# Patient Record
Sex: FEMALE | Race: WHITE | NOT HISPANIC OR LATINO
[De-identification: names, ages, dates, MRNs, and addresses within clinical notes are randomized per-mention and may not be internally consistent; named-entity substitution may affect disease eponyms.]

---

## 2017-03-07 ENCOUNTER — APPOINTMENT (OUTPATIENT)
Dept: DERMATOLOGY | Facility: CLINIC | Age: 82
End: 2017-03-07

## 2017-05-30 ENCOUNTER — APPOINTMENT (OUTPATIENT)
Dept: DERMATOLOGY | Facility: CLINIC | Age: 82
End: 2017-05-30

## 2017-07-27 ENCOUNTER — APPOINTMENT (OUTPATIENT)
Dept: OBGYN | Facility: CLINIC | Age: 82
End: 2017-07-27

## 2017-10-19 ENCOUNTER — APPOINTMENT (OUTPATIENT)
Dept: NEUROLOGY | Facility: CLINIC | Age: 82
End: 2017-10-19

## 2017-10-24 ENCOUNTER — APPOINTMENT (OUTPATIENT)
Dept: DERMATOLOGY | Facility: CLINIC | Age: 82
End: 2017-10-24
Payer: MEDICARE

## 2017-10-24 VITALS — DIASTOLIC BLOOD PRESSURE: 60 MMHG | SYSTOLIC BLOOD PRESSURE: 96 MMHG

## 2017-10-24 PROCEDURE — 99214 OFFICE O/P EST MOD 30 MIN: CPT | Mod: GC

## 2017-10-24 RX ORDER — TRIAMCINOLONE ACETONIDE 1 MG/G
0.1 CREAM TOPICAL
Qty: 1 | Refills: 1 | Status: COMPLETED | COMMUNITY
Start: 2017-02-01 | End: 2017-10-24

## 2017-10-25 LAB
ALBUMIN SERPL ELPH-MCNC: 3.7 G/DL
ALP BLD-CCNC: 77 U/L
ALT SERPL-CCNC: 20 U/L
ANION GAP SERPL CALC-SCNC: 14 MMOL/L
AST SERPL-CCNC: 22 U/L
BASOPHILS # BLD AUTO: 0.05 K/UL
BASOPHILS NFR BLD AUTO: 0.6 %
BILIRUB SERPL-MCNC: 0.2 MG/DL
BUN SERPL-MCNC: 26 MG/DL
CALCIUM SERPL-MCNC: 9.6 MG/DL
CHLORIDE SERPL-SCNC: 104 MMOL/L
CO2 SERPL-SCNC: 26 MMOL/L
CREAT SERPL-MCNC: 0.79 MG/DL
EOSINOPHIL # BLD AUTO: 1.03 K/UL
EOSINOPHIL NFR BLD AUTO: 13.2 %
GLUCOSE SERPL-MCNC: 81 MG/DL
HCT VFR BLD CALC: 37.3 %
HGB BLD-MCNC: 12.1 G/DL
IMM GRANULOCYTES NFR BLD AUTO: 0.4 %
LYMPHOCYTES # BLD AUTO: 0.98 K/UL
LYMPHOCYTES NFR BLD AUTO: 12.5 %
MAN DIFF?: NORMAL
MCHC RBC-ENTMCNC: 30.6 PG
MCHC RBC-ENTMCNC: 32.4 GM/DL
MCV RBC AUTO: 94.2 FL
MONOCYTES # BLD AUTO: 0.78 K/UL
MONOCYTES NFR BLD AUTO: 10 %
NEUTROPHILS # BLD AUTO: 4.96 K/UL
NEUTROPHILS NFR BLD AUTO: 63.3 %
PLATELET # BLD AUTO: 255 K/UL
POTASSIUM SERPL-SCNC: 4.3 MMOL/L
PROT SERPL-MCNC: 7.2 G/DL
RBC # BLD: 3.96 M/UL
RBC # FLD: 13.9 %
SODIUM SERPL-SCNC: 144 MMOL/L
WBC # FLD AUTO: 7.83 K/UL

## 2017-11-10 ENCOUNTER — APPOINTMENT (OUTPATIENT)
Dept: DERMATOLOGY | Facility: CLINIC | Age: 82
End: 2017-11-10
Payer: MEDICARE

## 2017-11-10 VITALS — DIASTOLIC BLOOD PRESSURE: 60 MMHG | SYSTOLIC BLOOD PRESSURE: 94 MMHG

## 2017-11-10 PROCEDURE — 99213 OFFICE O/P EST LOW 20 MIN: CPT | Mod: 25,GC

## 2017-11-10 PROCEDURE — 96372 THER/PROPH/DIAG INJ SC/IM: CPT | Mod: GC

## 2017-11-27 ENCOUNTER — APPOINTMENT (OUTPATIENT)
Dept: DERMATOLOGY | Facility: CLINIC | Age: 82
End: 2017-11-27

## 2017-12-13 ENCOUNTER — APPOINTMENT (OUTPATIENT)
Dept: DERMATOLOGY | Facility: CLINIC | Age: 82
End: 2017-12-13

## 2017-12-15 ENCOUNTER — APPOINTMENT (OUTPATIENT)
Dept: DERMATOLOGY | Facility: CLINIC | Age: 82
End: 2017-12-15
Payer: MEDICARE

## 2017-12-15 PROCEDURE — 96372 THER/PROPH/DIAG INJ SC/IM: CPT

## 2017-12-15 PROCEDURE — 99213 OFFICE O/P EST LOW 20 MIN: CPT | Mod: 25

## 2017-12-29 ENCOUNTER — APPOINTMENT (OUTPATIENT)
Dept: DERMATOLOGY | Facility: CLINIC | Age: 82
End: 2017-12-29
Payer: MEDICARE

## 2017-12-29 VITALS — SYSTOLIC BLOOD PRESSURE: 120 MMHG | DIASTOLIC BLOOD PRESSURE: 60 MMHG

## 2017-12-29 VITALS — SYSTOLIC BLOOD PRESSURE: 118 MMHG | DIASTOLIC BLOOD PRESSURE: 78 MMHG

## 2017-12-29 PROCEDURE — 96372 THER/PROPH/DIAG INJ SC/IM: CPT | Mod: GC

## 2017-12-29 PROCEDURE — 99213 OFFICE O/P EST LOW 20 MIN: CPT | Mod: 25,GC

## 2018-01-16 ENCOUNTER — MOBILE ON CALL (OUTPATIENT)
Age: 83
End: 2018-01-16

## 2018-01-16 ENCOUNTER — APPOINTMENT (OUTPATIENT)
Dept: DERMATOLOGY | Facility: CLINIC | Age: 83
End: 2018-01-16
Payer: MEDICARE

## 2018-01-16 VITALS — SYSTOLIC BLOOD PRESSURE: 110 MMHG | DIASTOLIC BLOOD PRESSURE: 64 MMHG

## 2018-01-16 PROCEDURE — 99213 OFFICE O/P EST LOW 20 MIN: CPT | Mod: 25

## 2018-01-16 PROCEDURE — 96372 THER/PROPH/DIAG INJ SC/IM: CPT

## 2018-01-30 ENCOUNTER — APPOINTMENT (OUTPATIENT)
Dept: CARDIOLOGY | Facility: CLINIC | Age: 83
End: 2018-01-30
Payer: MEDICARE

## 2018-01-30 ENCOUNTER — NON-APPOINTMENT (OUTPATIENT)
Age: 83
End: 2018-01-30

## 2018-01-30 VITALS
HEART RATE: 100 BPM | WEIGHT: 90 LBS | DIASTOLIC BLOOD PRESSURE: 68 MMHG | HEIGHT: 61 IN | BODY MASS INDEX: 16.99 KG/M2 | SYSTOLIC BLOOD PRESSURE: 112 MMHG | OXYGEN SATURATION: 97 %

## 2018-01-30 DIAGNOSIS — Z00.00 ENCOUNTER FOR GENERAL ADULT MEDICAL EXAMINATION W/OUT ABNORMAL FINDINGS: ICD-10-CM

## 2018-01-30 PROCEDURE — 99397 PER PM REEVAL EST PAT 65+ YR: CPT

## 2018-01-30 RX ORDER — DUPILUMAB 300 MG/2ML
300 INJECTION, SOLUTION SUBCUTANEOUS
Qty: 1 | Refills: 0 | Status: DISCONTINUED | COMMUNITY
Start: 2017-10-25 | End: 2018-01-30

## 2018-01-30 RX ORDER — FLUOROMETHOLONE 1 MG/ML
0.1 SOLUTION/ DROPS OPHTHALMIC
Qty: 5 | Refills: 0 | Status: DISCONTINUED | COMMUNITY
Start: 2017-07-06 | End: 2018-01-30

## 2018-01-30 RX ORDER — ALCLOMETASONE DIPROPIONATE 0.5 MG/G
0.05 CREAM TOPICAL
Qty: 60 | Refills: 0 | Status: DISCONTINUED | COMMUNITY
Start: 2017-07-18 | End: 2018-01-30

## 2018-01-30 RX ORDER — FLUOCINONIDE 0.5 MG/G
0.05 CREAM TOPICAL
Qty: 60 | Refills: 0 | Status: DISCONTINUED | COMMUNITY
Start: 2017-08-24 | End: 2018-01-30

## 2018-02-02 ENCOUNTER — APPOINTMENT (OUTPATIENT)
Dept: DERMATOLOGY | Facility: CLINIC | Age: 83
End: 2018-02-02
Payer: MEDICARE

## 2018-02-02 VITALS — DIASTOLIC BLOOD PRESSURE: 66 MMHG | SYSTOLIC BLOOD PRESSURE: 120 MMHG

## 2018-02-02 PROCEDURE — 99213 OFFICE O/P EST LOW 20 MIN: CPT | Mod: 25,GC

## 2018-02-02 PROCEDURE — 96372 THER/PROPH/DIAG INJ SC/IM: CPT | Mod: GC

## 2018-02-07 ENCOUNTER — MOBILE ON CALL (OUTPATIENT)
Age: 83
End: 2018-02-07

## 2018-02-16 ENCOUNTER — APPOINTMENT (OUTPATIENT)
Dept: DERMATOLOGY | Facility: CLINIC | Age: 83
End: 2018-02-16

## 2018-02-20 ENCOUNTER — RX RENEWAL (OUTPATIENT)
Age: 83
End: 2018-02-20

## 2018-02-26 ENCOUNTER — APPOINTMENT (OUTPATIENT)
Dept: DERMATOLOGY | Facility: CLINIC | Age: 83
End: 2018-02-26
Payer: MEDICARE

## 2018-02-26 VITALS — DIASTOLIC BLOOD PRESSURE: 58 MMHG | SYSTOLIC BLOOD PRESSURE: 100 MMHG

## 2018-02-26 PROCEDURE — 99214 OFFICE O/P EST MOD 30 MIN: CPT

## 2018-02-27 ENCOUNTER — MOBILE ON CALL (OUTPATIENT)
Age: 83
End: 2018-02-27

## 2018-03-19 ENCOUNTER — APPOINTMENT (OUTPATIENT)
Dept: DERMATOLOGY | Facility: CLINIC | Age: 83
End: 2018-03-19
Payer: MEDICARE

## 2018-03-19 PROCEDURE — 99213 OFFICE O/P EST LOW 20 MIN: CPT | Mod: GC

## 2018-03-23 ENCOUNTER — APPOINTMENT (OUTPATIENT)
Dept: DERMATOLOGY | Facility: CLINIC | Age: 83
End: 2018-03-23
Payer: MEDICARE

## 2018-03-23 VITALS — DIASTOLIC BLOOD PRESSURE: 62 MMHG | SYSTOLIC BLOOD PRESSURE: 114 MMHG

## 2018-03-23 PROCEDURE — 99213 OFFICE O/P EST LOW 20 MIN: CPT | Mod: 25,GC

## 2018-03-23 PROCEDURE — 96372 THER/PROPH/DIAG INJ SC/IM: CPT | Mod: GC

## 2018-04-06 ENCOUNTER — APPOINTMENT (OUTPATIENT)
Dept: DERMATOLOGY | Facility: CLINIC | Age: 83
End: 2018-04-06
Payer: MEDICARE

## 2018-04-06 VITALS — DIASTOLIC BLOOD PRESSURE: 60 MMHG | SYSTOLIC BLOOD PRESSURE: 110 MMHG

## 2018-04-06 PROCEDURE — 96372 THER/PROPH/DIAG INJ SC/IM: CPT

## 2018-04-06 PROCEDURE — 99213 OFFICE O/P EST LOW 20 MIN: CPT | Mod: 25

## 2018-04-11 ENCOUNTER — APPOINTMENT (OUTPATIENT)
Age: 83
End: 2018-04-11

## 2018-04-19 ENCOUNTER — APPOINTMENT (OUTPATIENT)
Dept: CARDIOLOGY | Facility: CLINIC | Age: 83
End: 2018-04-19

## 2018-04-20 ENCOUNTER — APPOINTMENT (OUTPATIENT)
Dept: DERMATOLOGY | Facility: CLINIC | Age: 83
End: 2018-04-20
Payer: MEDICARE

## 2018-04-20 VITALS — DIASTOLIC BLOOD PRESSURE: 62 MMHG | SYSTOLIC BLOOD PRESSURE: 110 MMHG

## 2018-04-20 PROCEDURE — 96372 THER/PROPH/DIAG INJ SC/IM: CPT | Mod: GC

## 2018-04-20 PROCEDURE — 99213 OFFICE O/P EST LOW 20 MIN: CPT | Mod: 25,GC

## 2018-05-01 ENCOUNTER — APPOINTMENT (OUTPATIENT)
Dept: DERMATOLOGY | Facility: CLINIC | Age: 83
End: 2018-05-01
Payer: MEDICARE

## 2018-05-01 VITALS
BODY MASS INDEX: 18.5 KG/M2 | DIASTOLIC BLOOD PRESSURE: 60 MMHG | HEIGHT: 61 IN | WEIGHT: 98 LBS | SYSTOLIC BLOOD PRESSURE: 90 MMHG

## 2018-05-01 PROCEDURE — 99212 OFFICE O/P EST SF 10 MIN: CPT

## 2018-05-04 ENCOUNTER — APPOINTMENT (OUTPATIENT)
Dept: DERMATOLOGY | Facility: CLINIC | Age: 83
End: 2018-05-04
Payer: MEDICARE

## 2018-05-04 VITALS — SYSTOLIC BLOOD PRESSURE: 108 MMHG | DIASTOLIC BLOOD PRESSURE: 60 MMHG

## 2018-05-04 PROCEDURE — 99213 OFFICE O/P EST LOW 20 MIN: CPT | Mod: 25,GC

## 2018-05-04 PROCEDURE — 96372 THER/PROPH/DIAG INJ SC/IM: CPT | Mod: GC

## 2018-05-08 ENCOUNTER — APPOINTMENT (OUTPATIENT)
Dept: DERMATOLOGY | Facility: CLINIC | Age: 83
End: 2018-05-08

## 2018-05-18 ENCOUNTER — APPOINTMENT (OUTPATIENT)
Dept: DERMATOLOGY | Facility: CLINIC | Age: 83
End: 2018-05-18
Payer: MEDICARE

## 2018-05-18 VITALS — DIASTOLIC BLOOD PRESSURE: 58 MMHG | SYSTOLIC BLOOD PRESSURE: 88 MMHG

## 2018-05-18 PROCEDURE — 99213 OFFICE O/P EST LOW 20 MIN: CPT | Mod: 25,GC

## 2018-05-18 PROCEDURE — 96372 THER/PROPH/DIAG INJ SC/IM: CPT | Mod: GC

## 2018-06-01 ENCOUNTER — APPOINTMENT (OUTPATIENT)
Dept: DERMATOLOGY | Facility: CLINIC | Age: 83
End: 2018-06-01
Payer: MEDICARE

## 2018-06-01 VITALS — DIASTOLIC BLOOD PRESSURE: 62 MMHG | SYSTOLIC BLOOD PRESSURE: 106 MMHG

## 2018-06-01 PROCEDURE — 99213 OFFICE O/P EST LOW 20 MIN: CPT | Mod: 25,GC

## 2018-06-01 PROCEDURE — 96372 THER/PROPH/DIAG INJ SC/IM: CPT | Mod: GC

## 2018-06-08 ENCOUNTER — APPOINTMENT (OUTPATIENT)
Dept: WOUND CARE | Facility: CLINIC | Age: 83
End: 2018-06-08
Payer: MEDICARE

## 2018-06-08 DIAGNOSIS — L03.032 CELLULITIS OF LEFT TOE: ICD-10-CM

## 2018-06-08 DIAGNOSIS — L60.0 INGROWING NAIL: ICD-10-CM

## 2018-06-08 PROCEDURE — 99213 OFFICE O/P EST LOW 20 MIN: CPT

## 2018-06-15 ENCOUNTER — APPOINTMENT (OUTPATIENT)
Dept: DERMATOLOGY | Facility: CLINIC | Age: 83
End: 2018-06-15
Payer: MEDICARE

## 2018-06-15 VITALS — SYSTOLIC BLOOD PRESSURE: 104 MMHG | DIASTOLIC BLOOD PRESSURE: 68 MMHG

## 2018-06-15 PROCEDURE — 99213 OFFICE O/P EST LOW 20 MIN: CPT | Mod: 25

## 2018-06-15 PROCEDURE — 96372 THER/PROPH/DIAG INJ SC/IM: CPT

## 2018-06-20 ENCOUNTER — APPOINTMENT (OUTPATIENT)
Dept: DERMATOLOGY | Facility: CLINIC | Age: 83
End: 2018-06-20
Payer: MEDICARE

## 2018-06-20 ENCOUNTER — APPOINTMENT (OUTPATIENT)
Dept: CARDIOLOGY | Facility: CLINIC | Age: 83
End: 2018-06-20
Payer: MEDICARE

## 2018-06-20 VITALS
BODY MASS INDEX: 17.37 KG/M2 | OXYGEN SATURATION: 99 % | HEIGHT: 61 IN | SYSTOLIC BLOOD PRESSURE: 99 MMHG | HEART RATE: 90 BPM | DIASTOLIC BLOOD PRESSURE: 61 MMHG | WEIGHT: 92 LBS

## 2018-06-20 VITALS — DIASTOLIC BLOOD PRESSURE: 60 MMHG | SYSTOLIC BLOOD PRESSURE: 102 MMHG

## 2018-06-20 PROCEDURE — 99213 OFFICE O/P EST LOW 20 MIN: CPT

## 2018-06-29 ENCOUNTER — APPOINTMENT (OUTPATIENT)
Dept: DERMATOLOGY | Facility: CLINIC | Age: 83
End: 2018-06-29
Payer: MEDICARE

## 2018-06-29 VITALS — DIASTOLIC BLOOD PRESSURE: 64 MMHG | SYSTOLIC BLOOD PRESSURE: 106 MMHG

## 2018-06-29 PROCEDURE — 96372 THER/PROPH/DIAG INJ SC/IM: CPT

## 2018-06-29 PROCEDURE — 99214 OFFICE O/P EST MOD 30 MIN: CPT | Mod: 25

## 2018-06-29 RX ORDER — EPINEPHRINE 0.3 MG/.3ML
0.3 INJECTION INTRAMUSCULAR
Qty: 1 | Refills: 2 | Status: ACTIVE | COMMUNITY
Start: 2018-06-29 | End: 1900-01-01

## 2018-07-06 ENCOUNTER — APPOINTMENT (OUTPATIENT)
Dept: ORTHOPEDIC SURGERY | Facility: CLINIC | Age: 83
End: 2018-07-06
Payer: MEDICARE

## 2018-07-06 VITALS
SYSTOLIC BLOOD PRESSURE: 122 MMHG | DIASTOLIC BLOOD PRESSURE: 76 MMHG | HEART RATE: 93 BPM | WEIGHT: 92 LBS | HEIGHT: 61 IN | BODY MASS INDEX: 17.37 KG/M2

## 2018-07-06 DIAGNOSIS — Z87.898 PERSONAL HISTORY OF OTHER SPECIFIED CONDITIONS: ICD-10-CM

## 2018-07-06 DIAGNOSIS — Z78.9 OTHER SPECIFIED HEALTH STATUS: ICD-10-CM

## 2018-07-06 DIAGNOSIS — L60.8 OTHER NAIL DISORDERS: ICD-10-CM

## 2018-07-06 DIAGNOSIS — M19.072 PRIMARY OSTEOARTHRITIS, LEFT ANKLE AND FOOT: ICD-10-CM

## 2018-07-06 DIAGNOSIS — B35.1 TINEA UNGUIUM: ICD-10-CM

## 2018-07-06 PROCEDURE — 99204 OFFICE O/P NEW MOD 45 MIN: CPT

## 2018-07-13 ENCOUNTER — APPOINTMENT (OUTPATIENT)
Dept: DERMATOLOGY | Facility: CLINIC | Age: 83
End: 2018-07-13
Payer: MEDICARE

## 2018-07-13 VITALS — DIASTOLIC BLOOD PRESSURE: 60 MMHG | SYSTOLIC BLOOD PRESSURE: 104 MMHG

## 2018-07-13 PROCEDURE — 96372 THER/PROPH/DIAG INJ SC/IM: CPT

## 2018-07-13 PROCEDURE — 99213 OFFICE O/P EST LOW 20 MIN: CPT | Mod: 25

## 2018-08-01 ENCOUNTER — APPOINTMENT (OUTPATIENT)
Dept: DERMATOLOGY | Facility: CLINIC | Age: 83
End: 2018-08-01
Payer: MEDICARE

## 2018-08-01 VITALS
HEIGHT: 61 IN | BODY MASS INDEX: 18.5 KG/M2 | DIASTOLIC BLOOD PRESSURE: 60 MMHG | WEIGHT: 98 LBS | SYSTOLIC BLOOD PRESSURE: 100 MMHG

## 2018-08-01 PROCEDURE — 99213 OFFICE O/P EST LOW 20 MIN: CPT

## 2018-08-03 ENCOUNTER — APPOINTMENT (OUTPATIENT)
Dept: WOUND CARE | Facility: CLINIC | Age: 83
End: 2018-08-03

## 2018-08-07 ENCOUNTER — APPOINTMENT (OUTPATIENT)
Dept: DERMATOLOGY | Facility: CLINIC | Age: 83
End: 2018-08-07
Payer: MEDICARE

## 2018-08-07 VITALS — DIASTOLIC BLOOD PRESSURE: 64 MMHG | SYSTOLIC BLOOD PRESSURE: 118 MMHG

## 2018-08-07 PROCEDURE — 96401 CHEMO ANTI-NEOPL SQ/IM: CPT | Mod: GC

## 2018-08-07 PROCEDURE — 99213 OFFICE O/P EST LOW 20 MIN: CPT | Mod: GC,25

## 2018-08-07 PROCEDURE — 96372 THER/PROPH/DIAG INJ SC/IM: CPT | Mod: GC

## 2018-08-07 RX ORDER — CETIRIZINE HCL 10 MG
10 TABLET ORAL
Refills: 0 | Status: ACTIVE | COMMUNITY

## 2018-08-31 ENCOUNTER — APPOINTMENT (OUTPATIENT)
Dept: DERMATOLOGY | Facility: CLINIC | Age: 83
End: 2018-08-31
Payer: MEDICARE

## 2018-08-31 VITALS — DIASTOLIC BLOOD PRESSURE: 62 MMHG | SYSTOLIC BLOOD PRESSURE: 116 MMHG

## 2018-08-31 PROCEDURE — 96372 THER/PROPH/DIAG INJ SC/IM: CPT

## 2018-08-31 PROCEDURE — 96401 CHEMO ANTI-NEOPL SQ/IM: CPT

## 2018-08-31 PROCEDURE — 99213 OFFICE O/P EST LOW 20 MIN: CPT | Mod: 25

## 2018-09-18 ENCOUNTER — APPOINTMENT (OUTPATIENT)
Dept: DERMATOLOGY | Facility: CLINIC | Age: 83
End: 2018-09-18
Payer: MEDICARE

## 2018-09-18 VITALS — SYSTOLIC BLOOD PRESSURE: 110 MMHG | DIASTOLIC BLOOD PRESSURE: 62 MMHG

## 2018-09-18 PROCEDURE — 96401 CHEMO ANTI-NEOPL SQ/IM: CPT

## 2018-09-18 PROCEDURE — 96372 THER/PROPH/DIAG INJ SC/IM: CPT

## 2018-09-18 PROCEDURE — 99213 OFFICE O/P EST LOW 20 MIN: CPT | Mod: 25

## 2018-10-12 ENCOUNTER — APPOINTMENT (OUTPATIENT)
Dept: DERMATOLOGY | Facility: CLINIC | Age: 83
End: 2018-10-12
Payer: MEDICARE

## 2018-10-12 VITALS — DIASTOLIC BLOOD PRESSURE: 70 MMHG | SYSTOLIC BLOOD PRESSURE: 118 MMHG

## 2018-10-12 PROCEDURE — 99213 OFFICE O/P EST LOW 20 MIN: CPT | Mod: 25

## 2018-10-12 PROCEDURE — 96401 CHEMO ANTI-NEOPL SQ/IM: CPT

## 2018-10-12 PROCEDURE — 96372 THER/PROPH/DIAG INJ SC/IM: CPT

## 2018-10-16 ENCOUNTER — MESSAGE (OUTPATIENT)
Age: 83
End: 2018-10-16

## 2018-11-02 ENCOUNTER — APPOINTMENT (OUTPATIENT)
Dept: DERMATOLOGY | Facility: CLINIC | Age: 83
End: 2018-11-02
Payer: MEDICARE

## 2018-11-02 VITALS — DIASTOLIC BLOOD PRESSURE: 60 MMHG | SYSTOLIC BLOOD PRESSURE: 100 MMHG

## 2018-11-02 PROCEDURE — 99213 OFFICE O/P EST LOW 20 MIN: CPT | Mod: 25

## 2018-11-02 PROCEDURE — 96372 THER/PROPH/DIAG INJ SC/IM: CPT

## 2018-11-30 ENCOUNTER — APPOINTMENT (OUTPATIENT)
Dept: DERMATOLOGY | Facility: CLINIC | Age: 83
End: 2018-11-30
Payer: MEDICARE

## 2018-11-30 VITALS — DIASTOLIC BLOOD PRESSURE: 70 MMHG | SYSTOLIC BLOOD PRESSURE: 110 MMHG

## 2018-11-30 PROCEDURE — 99213 OFFICE O/P EST LOW 20 MIN: CPT | Mod: 25

## 2018-11-30 PROCEDURE — 96372 THER/PROPH/DIAG INJ SC/IM: CPT

## 2018-12-21 ENCOUNTER — APPOINTMENT (OUTPATIENT)
Dept: DERMATOLOGY | Facility: CLINIC | Age: 83
End: 2018-12-21
Payer: MEDICARE

## 2018-12-21 PROCEDURE — 96372 THER/PROPH/DIAG INJ SC/IM: CPT

## 2018-12-21 PROCEDURE — 99213 OFFICE O/P EST LOW 20 MIN: CPT | Mod: 25

## 2018-12-26 ENCOUNTER — NON-APPOINTMENT (OUTPATIENT)
Age: 83
End: 2018-12-26

## 2018-12-26 ENCOUNTER — APPOINTMENT (OUTPATIENT)
Dept: CARDIOLOGY | Facility: CLINIC | Age: 83
End: 2018-12-26
Payer: MEDICARE

## 2018-12-26 VITALS
DIASTOLIC BLOOD PRESSURE: 66 MMHG | WEIGHT: 91 LBS | HEIGHT: 61 IN | SYSTOLIC BLOOD PRESSURE: 112 MMHG | BODY MASS INDEX: 17.18 KG/M2 | HEART RATE: 95 BPM | OXYGEN SATURATION: 98 %

## 2018-12-26 PROCEDURE — 93000 ELECTROCARDIOGRAM COMPLETE: CPT

## 2018-12-26 PROCEDURE — 99213 OFFICE O/P EST LOW 20 MIN: CPT

## 2019-01-11 ENCOUNTER — MESSAGE (OUTPATIENT)
Age: 84
End: 2019-01-11

## 2019-01-11 ENCOUNTER — APPOINTMENT (OUTPATIENT)
Dept: DERMATOLOGY | Facility: CLINIC | Age: 84
End: 2019-01-11
Payer: MEDICARE

## 2019-01-11 VITALS — SYSTOLIC BLOOD PRESSURE: 100 MMHG | DIASTOLIC BLOOD PRESSURE: 62 MMHG

## 2019-01-11 PROCEDURE — 96372 THER/PROPH/DIAG INJ SC/IM: CPT

## 2019-01-11 PROCEDURE — 99213 OFFICE O/P EST LOW 20 MIN: CPT | Mod: 25

## 2019-01-11 NOTE — PHYSICAL EXAM
[Alert] : alert [Oriented x 3] : ~L oriented x 3 [Well Nourished] : well nourished [Conjunctiva Non-injected] : conjunctiva non-injected [No Visual Lymphadenopathy] : no visual  lymphadenopathy [No Clubbing] : no clubbing [No Edema] : no edema [No Bromhidrosis] : no bromhidrosis [No Chromhidrosis] : no chromhidrosis [FreeTextEntry3] : ectropion and tearing- stable.\par Mild srinivasa-ocular erythematous plaques - Improved\par

## 2019-01-11 NOTE — HISTORY OF PRESENT ILLNESS
[FreeTextEntry1] : f/up atopic dermatitis - on Dupixent [de-identified] : 85 yo F with severe atopic dermatitis since childhood, asthma, seizures on Rufinamide (Banzel) here for f/u. Present with . LV 3 wks ago. Patient notes that her trunk and extremities are much improved, just intermittently flaring, but today she is clear. Has been using Tacrolimus around eyes. Otherwise, no new, evolving, or symptomatic skin lesions. \par \par Background of facial redness (summer 2018):  Pt concerned that on 3 separate occasions, about 4 days after dupixent injection she has gotten facial redness, facial pruritus, eye swelling, lip swelling. It goes away in about 2 days. Denies SOB during these episodes. She was seen on 6/20 and advised to stick to her safe list and use only sarna or sarna sensitive, and DC tacrolimus. Takes allegra occasionally, not standing.\par \par Hx:\par Atopic dermatitis on body currently well controlled on Dupixent. Body doing well today without flares but face still red and itchy. 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 No cold sores.\par No fevers/chills. \par \par Otherwise, has been tolerating dupiliumab well w/ conjunctivitis followed by opthalmology. \par She has tried multiple topical steroids including alclometasone cream, Eucrisa, TAC oint, Fluocinonide cream, and desoximetasone cream with minimal results. She may have tried MTX but cannot recall exactly or any other oral medications. No use of bleach baths. Using Vaseline daily, All free and clear products. Endorses chronic watering of her eyes and receives steroid treatments for her eyes.

## 2019-01-25 ENCOUNTER — APPOINTMENT (OUTPATIENT)
Dept: DERMATOLOGY | Facility: CLINIC | Age: 84
End: 2019-01-25
Payer: MEDICARE

## 2019-01-25 PROCEDURE — 96372 THER/PROPH/DIAG INJ SC/IM: CPT

## 2019-01-25 PROCEDURE — 99213 OFFICE O/P EST LOW 20 MIN: CPT | Mod: 25

## 2019-01-25 NOTE — HISTORY OF PRESENT ILLNESS
[FreeTextEntry1] : f/up atopic dermatitis - on Dupixent [de-identified] : 85 yo F with severe atopic dermatitis since childhood, asthma, seizures on Rufinamide (Banzel) here for f/u. Present with . LV 2 wks ago. Patient notes that her trunk and extremities are much improved. Has been using Tacrolimus around eyes. Otherwise, no new, evolving, or symptomatic skin lesions. \par \par Background of facial redness (summer 2018):  Pt concerned that on 3 separate occasions, about 4 days after dupixent injection she has gotten facial redness, facial pruritus, eye swelling, lip swelling. It goes away in about 2 days. Denies SOB during these episodes. She was seen on 6/20 and advised to stick to her safe list and use only sarna or sarna sensitive, and DC tacrolimus. Takes allegra occasionally, not standing.\par \par Hx:\par Atopic dermatitis on body currently well controlled on Dupixent. Body doing well today without flares but face still red and itchy. 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 No cold sores.\par No fevers/chills. \par \par Otherwise, has been tolerating dupiliumab well w/ conjunctivitis followed by opthalmology. \par She has tried multiple topical steroids including alclometasone cream, Eucrisa, TAC oint, Fluocinonide cream, and desoximetasone cream with minimal results. She may have tried MTX but cannot recall exactly or any other oral medications. No use of bleach baths. Using Vaseline daily, All free and clear products. Endorses chronic watering of her eyes and receives steroid treatments for her eyes.

## 2019-02-15 ENCOUNTER — APPOINTMENT (OUTPATIENT)
Dept: DERMATOLOGY | Facility: CLINIC | Age: 84
End: 2019-02-15
Payer: MEDICARE

## 2019-02-15 PROCEDURE — 99213 OFFICE O/P EST LOW 20 MIN: CPT | Mod: 25

## 2019-02-15 PROCEDURE — 96372 THER/PROPH/DIAG INJ SC/IM: CPT

## 2019-03-08 ENCOUNTER — APPOINTMENT (OUTPATIENT)
Dept: DERMATOLOGY | Facility: CLINIC | Age: 84
End: 2019-03-08
Payer: MEDICARE

## 2019-03-08 VITALS — SYSTOLIC BLOOD PRESSURE: 96 MMHG | DIASTOLIC BLOOD PRESSURE: 62 MMHG

## 2019-03-08 PROCEDURE — 96372 THER/PROPH/DIAG INJ SC/IM: CPT

## 2019-03-08 PROCEDURE — 99213 OFFICE O/P EST LOW 20 MIN: CPT | Mod: 25

## 2019-03-22 ENCOUNTER — APPOINTMENT (OUTPATIENT)
Dept: DERMATOLOGY | Facility: CLINIC | Age: 84
End: 2019-03-22
Payer: MEDICARE

## 2019-03-22 PROCEDURE — 99213 OFFICE O/P EST LOW 20 MIN: CPT | Mod: 25

## 2019-03-22 PROCEDURE — 96372 THER/PROPH/DIAG INJ SC/IM: CPT

## 2019-04-08 ENCOUNTER — APPOINTMENT (OUTPATIENT)
Dept: DERMATOLOGY | Facility: CLINIC | Age: 84
End: 2019-04-08
Payer: MEDICARE

## 2019-04-08 ENCOUNTER — APPOINTMENT (OUTPATIENT)
Dept: OPHTHALMOLOGY | Facility: CLINIC | Age: 84
End: 2019-04-08
Payer: MEDICARE

## 2019-04-08 PROCEDURE — 99214 OFFICE O/P EST MOD 30 MIN: CPT

## 2019-04-08 PROCEDURE — 92285 EXTERNAL OCULAR PHOTOGRAPHY: CPT

## 2019-04-08 PROCEDURE — 92002 INTRM OPH EXAM NEW PATIENT: CPT

## 2019-04-22 ENCOUNTER — APPOINTMENT (OUTPATIENT)
Dept: OPHTHALMOLOGY | Facility: CLINIC | Age: 84
End: 2019-04-22
Payer: MEDICARE

## 2019-04-22 PROCEDURE — 92012 INTRM OPH EXAM EST PATIENT: CPT

## 2019-04-29 ENCOUNTER — APPOINTMENT (OUTPATIENT)
Dept: DERMATOLOGY | Facility: CLINIC | Age: 84
End: 2019-04-29
Payer: MEDICARE

## 2019-04-29 DIAGNOSIS — Z86.69 PERSONAL HISTORY OF OTHER DISEASES OF THE NERVOUS SYSTEM AND SENSE ORGANS: ICD-10-CM

## 2019-04-29 PROCEDURE — 99213 OFFICE O/P EST LOW 20 MIN: CPT | Mod: 25

## 2019-04-29 PROCEDURE — 96401 CHEMO ANTI-NEOPL SQ/IM: CPT

## 2019-05-20 ENCOUNTER — APPOINTMENT (OUTPATIENT)
Dept: DERMATOLOGY | Facility: CLINIC | Age: 84
End: 2019-05-20
Payer: MEDICARE

## 2019-05-20 DIAGNOSIS — L25.9 UNSPECIFIED CONTACT DERMATITIS, UNSPECIFIED CAUSE: ICD-10-CM

## 2019-05-20 PROCEDURE — 99213 OFFICE O/P EST LOW 20 MIN: CPT | Mod: 25

## 2019-05-20 PROCEDURE — 96401 CHEMO ANTI-NEOPL SQ/IM: CPT

## 2019-05-20 NOTE — HISTORY OF PRESENT ILLNESS
[FreeTextEntry1] : f/u atopic dermatitis  [de-identified] : 87 yo F with severe atopic dermatitis since childhood, asthma, seizures on Rufinamide (Banzel) here for f/u. Present with . \par \par 1) Atopic dermatitis- has been doing well on the dupixent. Itching and rash much improved. However, notes flaring of skin around eyes today. Has only been using erythromycin ointment. Not using Tacrolimus ointment. Otherwise, no new, evolving, or symptomatic skin lesions. \par

## 2019-05-20 NOTE — PHYSICAL EXAM
[Alert] : alert [Oriented x 3] : ~L oriented x 3 [Well Nourished] : well nourished [Conjunctiva Non-injected] : conjunctiva non-injected [No Visual Lymphadenopathy] : no visual  lymphadenopathy [No Clubbing] : no clubbing [No Edema] : no edema [No Bromhidrosis] : no bromhidrosis [No Chromhidrosis] : no chromhidrosis [FreeTextEntry3] : erythematous symmetrical scaly plaques periocularly\par \par Back, trunk and extremities clear\par

## 2019-05-24 ENCOUNTER — APPOINTMENT (OUTPATIENT)
Dept: DERMATOLOGY | Facility: CLINIC | Age: 84
End: 2019-05-24

## 2019-06-10 ENCOUNTER — MEDICATION RENEWAL (OUTPATIENT)
Age: 84
End: 2019-06-10

## 2019-06-14 ENCOUNTER — APPOINTMENT (OUTPATIENT)
Dept: DERMATOLOGY | Facility: CLINIC | Age: 84
End: 2019-06-14
Payer: MEDICARE

## 2019-06-14 VITALS — SYSTOLIC BLOOD PRESSURE: 108 MMHG | DIASTOLIC BLOOD PRESSURE: 62 MMHG

## 2019-06-14 PROCEDURE — 96401 CHEMO ANTI-NEOPL SQ/IM: CPT

## 2019-06-14 PROCEDURE — 99213 OFFICE O/P EST LOW 20 MIN: CPT | Mod: 25

## 2019-06-14 NOTE — HISTORY OF PRESENT ILLNESS
[FreeTextEntry1] : f/u atopic dermatitis  [de-identified] : 85 yo F with severe atopic dermatitis since childhood, asthma, seizures on Rufinamide (Banzel) here for f/u. Present with . \par \par 1) Atopic dermatitis- has been doing well on the dupixent. Itching and rash much improved. Flare around the eyes has improved with tacrolimus use. Otherwise, no new, evolving, or symptomatic skin lesions. \par

## 2019-06-14 NOTE — PHYSICAL EXAM
[Alert] : alert [Oriented x 3] : ~L oriented x 3 [Well Nourished] : well nourished [Conjunctiva Non-injected] : conjunctiva non-injected [No Visual Lymphadenopathy] : no visual  lymphadenopathy [No Clubbing] : no clubbing [No Edema] : no edema [No Chromhidrosis] : no chromhidrosis [No Bromhidrosis] : no bromhidrosis [FreeTextEntry3] : pink symmetrical scaly plaques periocularly\par \par Back, trunk and extremities clear\par

## 2019-06-26 ENCOUNTER — APPOINTMENT (OUTPATIENT)
Dept: CARDIOLOGY | Facility: CLINIC | Age: 84
End: 2019-06-26

## 2019-07-08 ENCOUNTER — APPOINTMENT (OUTPATIENT)
Dept: DERMATOLOGY | Facility: CLINIC | Age: 84
End: 2019-07-08
Payer: MEDICARE

## 2019-07-08 PROCEDURE — 96401 CHEMO ANTI-NEOPL SQ/IM: CPT

## 2019-07-08 PROCEDURE — 99213 OFFICE O/P EST LOW 20 MIN: CPT | Mod: 25

## 2019-07-22 ENCOUNTER — APPOINTMENT (OUTPATIENT)
Dept: OPHTHALMOLOGY | Facility: CLINIC | Age: 84
End: 2019-07-22

## 2019-08-09 ENCOUNTER — APPOINTMENT (OUTPATIENT)
Dept: DERMATOLOGY | Facility: CLINIC | Age: 84
End: 2019-08-09
Payer: MEDICARE

## 2019-08-09 ENCOUNTER — APPOINTMENT (OUTPATIENT)
Dept: DERMATOLOGY | Facility: CLINIC | Age: 84
End: 2019-08-09

## 2019-08-09 PROCEDURE — 99213 OFFICE O/P EST LOW 20 MIN: CPT | Mod: 25

## 2019-08-09 PROCEDURE — 96401 CHEMO ANTI-NEOPL SQ/IM: CPT

## 2019-08-29 ENCOUNTER — APPOINTMENT (OUTPATIENT)
Dept: DERMATOLOGY | Facility: CLINIC | Age: 84
End: 2019-08-29
Payer: MEDICARE

## 2019-08-29 PROCEDURE — 99213 OFFICE O/P EST LOW 20 MIN: CPT | Mod: 25

## 2019-08-29 PROCEDURE — 11900 INJECT SKIN LESIONS </W 7: CPT

## 2019-09-06 ENCOUNTER — APPOINTMENT (OUTPATIENT)
Dept: DERMATOLOGY | Facility: CLINIC | Age: 84
End: 2019-09-06
Payer: MEDICARE

## 2019-09-06 PROCEDURE — 99213 OFFICE O/P EST LOW 20 MIN: CPT

## 2019-09-20 ENCOUNTER — APPOINTMENT (OUTPATIENT)
Dept: DERMATOLOGY | Facility: CLINIC | Age: 84
End: 2019-09-20
Payer: MEDICARE

## 2019-09-20 PROCEDURE — 99213 OFFICE O/P EST LOW 20 MIN: CPT

## 2019-10-11 ENCOUNTER — APPOINTMENT (OUTPATIENT)
Dept: DERMATOLOGY | Facility: CLINIC | Age: 84
End: 2019-10-11
Payer: MEDICARE

## 2019-10-11 ENCOUNTER — APPOINTMENT (OUTPATIENT)
Dept: DERMATOLOGY | Facility: CLINIC | Age: 84
End: 2019-10-11

## 2019-10-11 PROCEDURE — 96401 CHEMO ANTI-NEOPL SQ/IM: CPT

## 2019-10-11 PROCEDURE — 99213 OFFICE O/P EST LOW 20 MIN: CPT | Mod: 25

## 2019-10-11 RX ORDER — DESOXIMETASONE 0.5 MG/G
0.05 OINTMENT TOPICAL
Qty: 1 | Refills: 0 | Status: DISCONTINUED | COMMUNITY
Start: 2018-02-12 | End: 2019-10-11

## 2019-10-11 RX ORDER — TRIAMCINOLONE ACETONIDE 1 MG/G
0.1 OINTMENT TOPICAL
Qty: 1 | Refills: 0 | Status: DISCONTINUED | COMMUNITY
Start: 2018-05-01 | End: 2019-10-11

## 2019-10-11 RX ORDER — TACROLIMUS 1 MG/G
0.1 OINTMENT TOPICAL
Qty: 1 | Refills: 1 | Status: DISCONTINUED | COMMUNITY
Start: 2018-02-26 | End: 2019-10-11

## 2019-10-11 RX ORDER — TRIAMCINOLONE ACETONIDE 1 MG/G
0.1 OINTMENT TOPICAL
Qty: 1 | Refills: 1 | Status: ACTIVE | COMMUNITY
Start: 2019-10-11 | End: 1900-01-01

## 2019-10-11 RX ORDER — DESONIDE 0.5 MG/G
0.05 OINTMENT TOPICAL
Qty: 1 | Refills: 0 | Status: DISCONTINUED | COMMUNITY
Start: 2019-08-29 | End: 2019-10-11

## 2019-10-11 RX ORDER — HYDROCORTISONE 25 MG/G
2.5 OINTMENT TOPICAL
Qty: 1 | Refills: 0 | Status: DISCONTINUED | COMMUNITY
Start: 2018-05-01 | End: 2019-10-11

## 2019-10-31 ENCOUNTER — APPOINTMENT (OUTPATIENT)
Dept: DERMATOLOGY | Facility: CLINIC | Age: 84
End: 2019-10-31
Payer: MEDICARE

## 2019-10-31 PROCEDURE — 99213 OFFICE O/P EST LOW 20 MIN: CPT | Mod: GC

## 2019-11-04 ENCOUNTER — APPOINTMENT (OUTPATIENT)
Dept: DERMATOLOGY | Facility: CLINIC | Age: 84
End: 2019-11-04
Payer: MEDICARE

## 2019-11-04 PROCEDURE — 99213 OFFICE O/P EST LOW 20 MIN: CPT | Mod: 25

## 2019-11-04 PROCEDURE — 96401 CHEMO ANTI-NEOPL SQ/IM: CPT

## 2019-11-18 ENCOUNTER — APPOINTMENT (OUTPATIENT)
Dept: DERMATOLOGY | Facility: CLINIC | Age: 84
End: 2019-11-18
Payer: MEDICARE

## 2019-11-18 PROCEDURE — 96401 CHEMO ANTI-NEOPL SQ/IM: CPT | Mod: GC

## 2019-11-18 PROCEDURE — 99213 OFFICE O/P EST LOW 20 MIN: CPT | Mod: GC,25

## 2019-12-02 ENCOUNTER — APPOINTMENT (OUTPATIENT)
Dept: DERMATOLOGY | Facility: CLINIC | Age: 84
End: 2019-12-02
Payer: MEDICARE

## 2019-12-02 PROCEDURE — 99213 OFFICE O/P EST LOW 20 MIN: CPT | Mod: GC,25

## 2019-12-02 PROCEDURE — 96401 CHEMO ANTI-NEOPL SQ/IM: CPT | Mod: GC

## 2019-12-16 ENCOUNTER — APPOINTMENT (OUTPATIENT)
Dept: DERMATOLOGY | Facility: CLINIC | Age: 84
End: 2019-12-16
Payer: MEDICARE

## 2019-12-16 PROCEDURE — 99213 OFFICE O/P EST LOW 20 MIN: CPT | Mod: GC,25

## 2019-12-16 PROCEDURE — 96401 CHEMO ANTI-NEOPL SQ/IM: CPT | Mod: GC

## 2019-12-30 ENCOUNTER — APPOINTMENT (OUTPATIENT)
Dept: DERMATOLOGY | Facility: CLINIC | Age: 84
End: 2019-12-30

## 2020-01-03 ENCOUNTER — APPOINTMENT (OUTPATIENT)
Dept: DERMATOLOGY | Facility: CLINIC | Age: 85
End: 2020-01-03
Payer: MEDICARE

## 2020-01-03 PROCEDURE — 99213 OFFICE O/P EST LOW 20 MIN: CPT | Mod: 25

## 2020-01-03 PROCEDURE — 96401 CHEMO ANTI-NEOPL SQ/IM: CPT

## 2020-01-13 ENCOUNTER — APPOINTMENT (OUTPATIENT)
Dept: OPHTHALMOLOGY | Facility: CLINIC | Age: 85
End: 2020-01-13
Payer: MEDICARE

## 2020-01-13 ENCOUNTER — NON-APPOINTMENT (OUTPATIENT)
Age: 85
End: 2020-01-13

## 2020-01-13 ENCOUNTER — APPOINTMENT (OUTPATIENT)
Dept: ORTHOPEDIC SURGERY | Facility: CLINIC | Age: 85
End: 2020-01-13
Payer: MEDICARE

## 2020-01-13 VITALS
DIASTOLIC BLOOD PRESSURE: 67 MMHG | WEIGHT: 91 LBS | BODY MASS INDEX: 17.18 KG/M2 | HEIGHT: 61 IN | HEART RATE: 96 BPM | SYSTOLIC BLOOD PRESSURE: 115 MMHG

## 2020-01-13 DIAGNOSIS — S93.412A SPRAIN OF CALCANEOFIBULAR LIGAMENT OF LEFT ANKLE, INITIAL ENCOUNTER: ICD-10-CM

## 2020-01-13 DIAGNOSIS — M25.572 PAIN IN LEFT ANKLE AND JOINTS OF LEFT FOOT: ICD-10-CM

## 2020-01-13 PROCEDURE — 99214 OFFICE O/P EST MOD 30 MIN: CPT

## 2020-01-13 PROCEDURE — 73610 X-RAY EXAM OF ANKLE: CPT | Mod: LT

## 2020-01-13 PROCEDURE — 92012 INTRM OPH EXAM EST PATIENT: CPT

## 2020-01-13 NOTE — DISCUSSION/SUMMARY
[de-identified] : The patient and her accompanying  were advised of findings and showed the x-rays. He was advised that she has a very mild brain and he is in a state of resolution as time. Patient will rest use cold packs and a compression wrap if needed.\par \par Followup next week if symptoms persist

## 2020-01-13 NOTE — DISCUSSION/SUMMARY
[de-identified] : The patient and her accompanying  were advised of findings and showed the x-rays. He was advised that she has a very mild brain and he is in a state of resolution as time. Patient will rest use cold packs and a compression wrap if needed.\par \par Followup next week if symptoms persist

## 2020-01-13 NOTE — HISTORY OF PRESENT ILLNESS
[Stiff Soled Shoes] : stiff soled shoes [FreeTextEntry1] : Pt presents for initial evaluation with pain in her left foot. Pt evidently fell 1/10/20  and injured her left ankle. Pt felt pain  and [facility]  xray taken, No pain medication was taken. pt is using a cane for ambulation [de-identified] : The patient is here for followup evaluation of her left knee and lower extremity. At this time, she does not complain of any pain or discomfort. There have been multiple prior conversations with her internist as well as with our orthopedic staff including myself, as to the nature of the patient's condition I it's occurred and whether there has been a discrepancy in medications prescribed

## 2020-01-13 NOTE — HISTORY OF PRESENT ILLNESS
[Stiff Soled Shoes] : stiff soled shoes [FreeTextEntry1] : Pt presents for initial evaluation with pain in her left foot. Pt evidently fell 1/10/20  and injured her left ankle. Pt felt pain  and [facility]  xray taken, No pain medication was taken. pt is using a cane for ambulation [de-identified] : The patient is here for followup evaluation of her left knee and lower extremity. At this time, she does not complain of any pain or discomfort. There have been multiple prior conversations with her internist as well as with our orthopedic staff including myself, as to the nature of the patient's condition I it's occurred and whether there has been a discrepancy in medications prescribed

## 2020-01-13 NOTE — PHYSICAL EXAM
[de-identified] : Physical examination discloses minimal to no tenderness of the lateral malleolus on palpation. No acute soft tissue swelling,defects or deformities. No signs of instability [de-identified] : X-rays taken of the left ankle and AP lateral and internal oblique projections do not reveal any acute fractures, or dislocations. Ankle mortise remains reduced.

## 2020-01-13 NOTE — PHYSICAL EXAM
[de-identified] : Physical examination discloses minimal to no tenderness of the lateral malleolus on palpation. No acute soft tissue swelling,defects or deformities. No signs of instability [de-identified] : X-rays taken of the left ankle and AP lateral and internal oblique projections do not reveal any acute fractures, or dislocations. Ankle mortise remains reduced.

## 2020-01-23 ENCOUNTER — MESSAGE (OUTPATIENT)
Age: 85
End: 2020-01-23

## 2020-01-24 ENCOUNTER — APPOINTMENT (OUTPATIENT)
Dept: DERMATOLOGY | Facility: CLINIC | Age: 85
End: 2020-01-24
Payer: MEDICARE

## 2020-01-24 ENCOUNTER — OTHER (OUTPATIENT)
Age: 85
End: 2020-01-24

## 2020-01-24 PROCEDURE — 99213 OFFICE O/P EST LOW 20 MIN: CPT | Mod: 25

## 2020-01-24 PROCEDURE — 96401 CHEMO ANTI-NEOPL SQ/IM: CPT

## 2020-01-24 NOTE — PHYSICAL EXAM
[Alert] : alert [Oriented x 3] : ~L oriented x 3 [Conjunctiva Non-injected] : conjunctiva non-injected [Well Nourished] : well nourished [No Clubbing] : no clubbing [No Visual Lymphadenopathy] : no visual  lymphadenopathy [No Edema] : no edema [No Bromhidrosis] : no bromhidrosis [No Chromhidrosis] : no chromhidrosis [FreeTextEntry3] : Back, trunk and extremities clear of eczema\par ectropion b/l\par red, slightly scaly plaques on b/l  eyelids

## 2020-01-24 NOTE — HISTORY OF PRESENT ILLNESS
[FreeTextEntry1] : f/u atopic dermatitis  [de-identified] : KANG VILLAFANA is a 87 year old F severe atopic dermatitis since childhood, asthma, seizures here for:\par \par 1) Atopic dermatitis f/u. Present with . Doing well on dupixent 300 mg q 2-4 weeks (last injection 3.5 weeks ago), body is clear now. Reports flare of facial dermatitis that comesa nd goes. Denies use of new products on face including eye drops. Not using tacrolimus 0.1% ointment regularly.  Follows with ophtho for conjunctivitis and ectropion.

## 2020-01-31 ENCOUNTER — NON-APPOINTMENT (OUTPATIENT)
Age: 85
End: 2020-01-31

## 2020-01-31 ENCOUNTER — APPOINTMENT (OUTPATIENT)
Dept: CARDIOLOGY | Facility: CLINIC | Age: 85
End: 2020-01-31
Payer: MEDICARE

## 2020-01-31 ENCOUNTER — LABORATORY RESULT (OUTPATIENT)
Age: 85
End: 2020-01-31

## 2020-01-31 VITALS
SYSTOLIC BLOOD PRESSURE: 120 MMHG | TEMPERATURE: 98.1 F | WEIGHT: 86 LBS | DIASTOLIC BLOOD PRESSURE: 66 MMHG | OXYGEN SATURATION: 99 % | BODY MASS INDEX: 16.25 KG/M2 | RESPIRATION RATE: 16 BRPM | HEART RATE: 94 BPM

## 2020-01-31 PROCEDURE — 99214 OFFICE O/P EST MOD 30 MIN: CPT

## 2020-01-31 PROCEDURE — 93000 ELECTROCARDIOGRAM COMPLETE: CPT

## 2020-01-31 PROCEDURE — 36415 COLL VENOUS BLD VENIPUNCTURE: CPT

## 2020-02-07 ENCOUNTER — APPOINTMENT (OUTPATIENT)
Dept: GERIATRICS | Facility: CLINIC | Age: 85
End: 2020-02-07
Payer: MEDICARE

## 2020-02-07 VITALS
DIASTOLIC BLOOD PRESSURE: 60 MMHG | HEIGHT: 61 IN | SYSTOLIC BLOOD PRESSURE: 100 MMHG | TEMPERATURE: 98.6 F | HEART RATE: 94 BPM | WEIGHT: 89 LBS | RESPIRATION RATE: 15 BRPM | BODY MASS INDEX: 16.8 KG/M2 | OXYGEN SATURATION: 96 %

## 2020-02-07 DIAGNOSIS — T14.8XXA OTHER INJURY OF UNSPECIFIED BODY REGION, INITIAL ENCOUNTER: ICD-10-CM

## 2020-02-07 DIAGNOSIS — Z87.2 PERSONAL HISTORY OF DISEASES OF THE SKIN AND SUBCUTANEOUS TISSUE: ICD-10-CM

## 2020-02-07 DIAGNOSIS — R60.0 LOCALIZED EDEMA: ICD-10-CM

## 2020-02-07 DIAGNOSIS — H35.30 UNSPECIFIED MACULAR DEGENERATION: ICD-10-CM

## 2020-02-07 DIAGNOSIS — M79.672 PAIN IN LEFT FOOT: ICD-10-CM

## 2020-02-07 DIAGNOSIS — Z60.2 PROBLEMS RELATED TO LIVING ALONE: ICD-10-CM

## 2020-02-07 DIAGNOSIS — M54.2 CERVICALGIA: ICD-10-CM

## 2020-02-07 DIAGNOSIS — Z87.828 PERSONAL HISTORY OF OTHER (HEALED) PHYSICAL INJURY AND TRAUMA: ICD-10-CM

## 2020-02-07 DIAGNOSIS — Z86.59 PERSONAL HISTORY OF OTHER MENTAL AND BEHAVIORAL DISORDERS: ICD-10-CM

## 2020-02-07 DIAGNOSIS — Z87.898 PERSONAL HISTORY OF OTHER SPECIFIED CONDITIONS: ICD-10-CM

## 2020-02-07 PROCEDURE — G0444 DEPRESSION SCREEN ANNUAL: CPT | Mod: 59

## 2020-02-07 PROCEDURE — 99205 OFFICE O/P NEW HI 60 MIN: CPT | Mod: 25

## 2020-02-07 RX ORDER — ERYTHROMYCIN 5 MG/G
5 OINTMENT OPHTHALMIC
Qty: 1 | Refills: 3 | Status: DISCONTINUED | COMMUNITY
Start: 2019-04-08 | End: 2020-02-07

## 2020-02-07 RX ORDER — NAPROXEN 500 MG/1
500 TABLET ORAL
Qty: 60 | Refills: 1 | Status: DISCONTINUED | COMMUNITY
Start: 2018-07-06 | End: 2020-02-07

## 2020-02-07 RX ORDER — FLUOROMETHOLONE 1 MG/G
0.1 OINTMENT OPHTHALMIC
Refills: 0 | Status: ACTIVE | COMMUNITY
Start: 2018-01-17

## 2020-02-07 RX ORDER — CHROMIUM 200 MCG
25 MCG TABLET ORAL
Refills: 0 | Status: ACTIVE | COMMUNITY
Start: 2020-02-07

## 2020-02-07 RX ORDER — METHYLPREDNISOLONE 4 MG/1
4 TABLET ORAL
Qty: 1 | Refills: 1 | Status: DISCONTINUED | COMMUNITY
Start: 2018-12-24 | End: 2020-02-07

## 2020-02-07 RX ORDER — RANIBIZUMAB 6 MG/ML
0.3 INJECTION, SOLUTION INTRAVITREAL
Refills: 0 | Status: ACTIVE | COMMUNITY
Start: 2020-02-07

## 2020-02-07 RX ORDER — RIVASTIGMINE 4.6 MG/24H
4.6 PATCH, EXTENDED RELEASE TRANSDERMAL
Refills: 0 | Status: ACTIVE | COMMUNITY
Start: 2018-04-24

## 2020-02-07 RX ORDER — PROPYLENE GLYCOL 0.6 G/100ML
LIQUID OPHTHALMIC
Refills: 0 | Status: DISCONTINUED | COMMUNITY
End: 2020-02-07

## 2020-02-07 SDOH — SOCIAL STABILITY - SOCIAL INSECURITY: PROBLEMS RELATED TO LIVING ALONE: Z60.2

## 2020-02-13 ENCOUNTER — NON-APPOINTMENT (OUTPATIENT)
Age: 85
End: 2020-02-13

## 2020-02-14 LAB
25(OH)D3 SERPL-MCNC: 34.5 NG/ML
ALBUMIN SERPL ELPH-MCNC: 4 G/DL
ALP BLD-CCNC: 68 U/L
ALT SERPL-CCNC: 19 U/L
ANION GAP SERPL CALC-SCNC: 15 MMOL/L
AST SERPL-CCNC: 23 U/L
BASOPHILS # BLD AUTO: 0.06 K/UL
BASOPHILS NFR BLD AUTO: 0.7 %
BILIRUB SERPL-MCNC: 0.3 MG/DL
BUN SERPL-MCNC: 25 MG/DL
CALCIUM SERPL-MCNC: 9.2 MG/DL
CHLORIDE SERPL-SCNC: 105 MMOL/L
CHOLEST SERPL-MCNC: 164 MG/DL
CHOLEST/HDLC SERPL: 3.9 RATIO
CO2 SERPL-SCNC: 26 MMOL/L
CREAT SERPL-MCNC: 0.58 MG/DL
EOSINOPHIL # BLD AUTO: 0.16 K/UL
EOSINOPHIL NFR BLD AUTO: 1.9 %
ESTIMATED AVERAGE GLUCOSE: 117 MG/DL
FT4I SERPL CALC-MCNC: 6.6 INDEX
GLUCOSE SERPL-MCNC: 59 MG/DL
HBA1C MFR BLD HPLC: 5.7 %
HCT VFR BLD CALC: 38.6 %
HDLC SERPL-MCNC: 43 MG/DL
HGB BLD-MCNC: 12 G/DL
IMM GRANULOCYTES NFR BLD AUTO: 0.6 %
LDLC SERPL CALC-MCNC: 108 MG/DL
LYMPHOCYTES # BLD AUTO: 1.5 K/UL
LYMPHOCYTES NFR BLD AUTO: 18.2 %
MAN DIFF?: NORMAL
MCHC RBC-ENTMCNC: 31.1 GM/DL
MCHC RBC-ENTMCNC: 31.1 PG
MCV RBC AUTO: 100 FL
MONOCYTES # BLD AUTO: 0.75 K/UL
MONOCYTES NFR BLD AUTO: 9.1 %
NEUTROPHILS # BLD AUTO: 5.73 K/UL
NEUTROPHILS NFR BLD AUTO: 69.5 %
PLATELET # BLD AUTO: 279 K/UL
POTASSIUM SERPL-SCNC: 4.4 MMOL/L
PROT SERPL-MCNC: 6.5 G/DL
RBC # BLD: 3.86 M/UL
RBC # FLD: 14.4 %
SODIUM SERPL-SCNC: 145 MMOL/L
T3 SERPL-MCNC: 92 NG/DL
T3RU NFR SERPL: 1 TBI
T4 SERPL-MCNC: 6.5 UG/DL
TRIGL SERPL-MCNC: 70 MG/DL
TSH SERPL-ACNC: 3.03 UIU/ML
WBC # FLD AUTO: 8.25 K/UL

## 2020-02-21 ENCOUNTER — APPOINTMENT (OUTPATIENT)
Dept: GERIATRICS | Facility: CLINIC | Age: 85
End: 2020-02-21
Payer: MEDICARE

## 2020-02-21 VITALS
WEIGHT: 89.6 LBS | BODY MASS INDEX: 12.14 KG/M2 | RESPIRATION RATE: 16 BRPM | SYSTOLIC BLOOD PRESSURE: 110 MMHG | OXYGEN SATURATION: 99 % | DIASTOLIC BLOOD PRESSURE: 60 MMHG | HEART RATE: 97 BPM | HEIGHT: 72 IN | TEMPERATURE: 98.1 F

## 2020-02-21 DIAGNOSIS — Z13.820 ENCOUNTER FOR SCREENING FOR OSTEOPOROSIS: ICD-10-CM

## 2020-02-21 DIAGNOSIS — H91.90 UNSPECIFIED HEARING LOSS, UNSPECIFIED EAR: ICD-10-CM

## 2020-02-21 DIAGNOSIS — Z23 ENCOUNTER FOR IMMUNIZATION: ICD-10-CM

## 2020-02-21 DIAGNOSIS — R63.4 ABNORMAL WEIGHT LOSS: ICD-10-CM

## 2020-02-21 PROCEDURE — 99483 ASSMT & CARE PLN PT COG IMP: CPT

## 2020-02-21 RX ORDER — DIVALPROEX SODIUM 500 MG/1
500 TABLET, DELAYED RELEASE ORAL TWICE DAILY
Refills: 0 | Status: ACTIVE | COMMUNITY
Start: 2020-01-31

## 2020-02-26 ENCOUNTER — APPOINTMENT (OUTPATIENT)
Dept: CARDIOLOGY | Facility: CLINIC | Age: 85
End: 2020-02-26

## 2020-02-28 ENCOUNTER — APPOINTMENT (OUTPATIENT)
Dept: DERMATOLOGY | Facility: CLINIC | Age: 85
End: 2020-02-28
Payer: MEDICARE

## 2020-02-28 PROCEDURE — 96401 CHEMO ANTI-NEOPL SQ/IM: CPT

## 2020-02-28 PROCEDURE — 99213 OFFICE O/P EST LOW 20 MIN: CPT | Mod: 25

## 2020-03-27 ENCOUNTER — APPOINTMENT (OUTPATIENT)
Dept: DERMATOLOGY | Facility: CLINIC | Age: 85
End: 2020-03-27

## 2020-04-09 ENCOUNTER — RX RENEWAL (OUTPATIENT)
Age: 85
End: 2020-04-09

## 2020-04-09 RX ORDER — GABAPENTIN 100 MG/1
100 CAPSULE ORAL
Qty: 90 | Refills: 1 | Status: ACTIVE | COMMUNITY
Start: 2020-02-21 | End: 1900-01-01

## 2020-04-14 ENCOUNTER — APPOINTMENT (OUTPATIENT)
Dept: DERMATOLOGY | Facility: CLINIC | Age: 85
End: 2020-04-14
Payer: MEDICARE

## 2020-04-14 PROCEDURE — 99213 OFFICE O/P EST LOW 20 MIN: CPT | Mod: 25

## 2020-04-14 PROCEDURE — 96401 CHEMO ANTI-NEOPL SQ/IM: CPT

## 2020-04-14 NOTE — HISTORY OF PRESENT ILLNESS
[FreeTextEntry1] : f/u atopic dermatitis  [de-identified] : KANG VILLAFANA is a 87 year old F severe atopic dermatitis since childhood, asthma, seizures here for:\par \par 1) Atopic dermatitis f/u. Present with . Doing well on dupixent 300 mg q 2-4 weeks (last injection ~6-7 weeks weeks ago). Body clear without itch. Reports flare of facial dermatitis that comes and goes. Denies use of new products on face including eye drops. Not using tacrolimus 0.1% ointment regularly.  Follows with ophtho for conjunctivitis and ectropion.

## 2020-04-14 NOTE — PHYSICAL EXAM
[Alert] : alert [Oriented x 3] : ~L oriented x 3 [Well Nourished] : well nourished [Conjunctiva Non-injected] : conjunctiva non-injected [No Visual Lymphadenopathy] : no visual  lymphadenopathy [No Clubbing] : no clubbing [No Edema] : no edema [No Bromhidrosis] : no bromhidrosis [No Chromhidrosis] : no chromhidrosis [FreeTextEntry3] : Back, trunk and extremities clear of eczema\par ectropion b/l\par red, slightly scaly plaques on b/l  eyelids

## 2020-04-17 ENCOUNTER — APPOINTMENT (OUTPATIENT)
Dept: GERIATRICS | Facility: CLINIC | Age: 85
End: 2020-04-17
Payer: MEDICARE

## 2020-04-17 DIAGNOSIS — Z71.89 OTHER SPECIFIED COUNSELING: ICD-10-CM

## 2020-04-17 DIAGNOSIS — F32.9 ANXIETY DISORDER, UNSPECIFIED: ICD-10-CM

## 2020-04-17 DIAGNOSIS — F41.9 ANXIETY DISORDER, UNSPECIFIED: ICD-10-CM

## 2020-04-17 DIAGNOSIS — G40.909 EPILEPSY, UNSPECIFIED, NOT INTRACTABLE, W/OUT STATUS EPILEPTICUS: ICD-10-CM

## 2020-04-17 PROCEDURE — 99214 OFFICE O/P EST MOD 30 MIN: CPT | Mod: 95

## 2020-05-01 ENCOUNTER — APPOINTMENT (OUTPATIENT)
Dept: OPHTHALMOLOGY | Facility: CLINIC | Age: 85
End: 2020-05-01
Payer: MEDICARE

## 2020-05-01 ENCOUNTER — NON-APPOINTMENT (OUTPATIENT)
Age: 85
End: 2020-05-01

## 2020-05-01 PROCEDURE — 99212 OFFICE O/P EST SF 10 MIN: CPT | Mod: 95

## 2020-05-26 ENCOUNTER — APPOINTMENT (OUTPATIENT)
Dept: DERMATOLOGY | Facility: CLINIC | Age: 85
End: 2020-05-26
Payer: MEDICARE

## 2020-05-26 PROCEDURE — 99213 OFFICE O/P EST LOW 20 MIN: CPT | Mod: 25

## 2020-05-26 PROCEDURE — 96401 CHEMO ANTI-NEOPL SQ/IM: CPT

## 2020-05-26 NOTE — HISTORY OF PRESENT ILLNESS
[FreeTextEntry1] : f/u atopic dermatitis  [de-identified] : KANG VILLAFANA is a 87 year old F severe atopic dermatitis since childhood, asthma, seizures here for:\par \par 1) Atopic dermatitis f/u. Present with . Doing well on dupixent 300 mg q 2-4 weeks (last injection April 14 2020). Body clear without itch. Reports flare of facial dermatitis that comes and goes.  Not using tacrolimus 0.1% ointment regularly.  Follows with ophtho for conjunctivitis and ectropion.

## 2020-05-26 NOTE — PHYSICAL EXAM
[Alert] : alert [Oriented x 3] : ~L oriented x 3 [Well Nourished] : well nourished [Conjunctiva Non-injected] : conjunctiva non-injected [No Visual Lymphadenopathy] : no visual  lymphadenopathy [No Clubbing] : no clubbing [No Edema] : no edema [No Bromhidrosis] : no bromhidrosis [No Chromhidrosis] : no chromhidrosis [FreeTextEntry3] : Back, trunk and extremities clear of eczema\par ectropion b/l

## 2020-06-02 ENCOUNTER — APPOINTMENT (OUTPATIENT)
Dept: DERMATOLOGY | Facility: CLINIC | Age: 85
End: 2020-06-02

## 2020-07-07 ENCOUNTER — APPOINTMENT (OUTPATIENT)
Dept: DERMATOLOGY | Facility: CLINIC | Age: 85
End: 2020-07-07
Payer: MEDICARE

## 2020-07-07 VITALS — TEMPERATURE: 97.1 F

## 2020-07-07 PROCEDURE — 99213 OFFICE O/P EST LOW 20 MIN: CPT | Mod: 25

## 2020-07-07 PROCEDURE — 96401 CHEMO ANTI-NEOPL SQ/IM: CPT

## 2020-07-09 ENCOUNTER — APPOINTMENT (OUTPATIENT)
Dept: OPHTHALMOLOGY | Facility: CLINIC | Age: 85
End: 2020-07-09

## 2020-07-21 ENCOUNTER — APPOINTMENT (OUTPATIENT)
Dept: OPHTHALMOLOGY | Facility: CLINIC | Age: 85
End: 2020-07-21

## 2020-07-28 ENCOUNTER — APPOINTMENT (OUTPATIENT)
Dept: DERMATOLOGY | Facility: CLINIC | Age: 85
End: 2020-07-28
Payer: MEDICARE

## 2020-07-28 VITALS — TEMPERATURE: 96.6 F

## 2020-07-28 PROCEDURE — 96401 CHEMO ANTI-NEOPL SQ/IM: CPT

## 2020-07-28 PROCEDURE — 99213 OFFICE O/P EST LOW 20 MIN: CPT | Mod: 25

## 2020-08-10 ENCOUNTER — NON-APPOINTMENT (OUTPATIENT)
Age: 85
End: 2020-08-10

## 2020-08-10 ENCOUNTER — APPOINTMENT (OUTPATIENT)
Dept: OPHTHALMOLOGY | Facility: CLINIC | Age: 85
End: 2020-08-10
Payer: MEDICARE

## 2020-08-10 PROCEDURE — 99212 OFFICE O/P EST SF 10 MIN: CPT | Mod: 95

## 2020-08-18 ENCOUNTER — APPOINTMENT (OUTPATIENT)
Dept: DERMATOLOGY | Facility: CLINIC | Age: 85
End: 2020-08-18
Payer: MEDICARE

## 2020-08-18 ENCOUNTER — APPOINTMENT (OUTPATIENT)
Dept: OPHTHALMOLOGY | Facility: CLINIC | Age: 85
End: 2020-08-18

## 2020-08-18 VITALS — TEMPERATURE: 96.8 F

## 2020-08-18 PROCEDURE — 99213 OFFICE O/P EST LOW 20 MIN: CPT | Mod: 25

## 2020-08-18 PROCEDURE — 96401 CHEMO ANTI-NEOPL SQ/IM: CPT

## 2020-09-03 ENCOUNTER — INPATIENT (INPATIENT)
Facility: HOSPITAL | Age: 85
LOS: 14 days | Discharge: NURSING, REHAB OR LONG-TERM CARE | DRG: 872 | End: 2020-09-18
Attending: HOSPITALIST | Admitting: HOSPITALIST
Payer: MEDICARE

## 2020-09-03 VITALS
WEIGHT: 95.02 LBS | HEART RATE: 68 BPM | DIASTOLIC BLOOD PRESSURE: 84 MMHG | RESPIRATION RATE: 18 BRPM | TEMPERATURE: 98 F | OXYGEN SATURATION: 98 % | SYSTOLIC BLOOD PRESSURE: 148 MMHG | HEIGHT: 60 IN

## 2020-09-03 DIAGNOSIS — N39.0 URINARY TRACT INFECTION, SITE NOT SPECIFIED: ICD-10-CM

## 2020-09-03 DIAGNOSIS — F03.90 UNSPECIFIED DEMENTIA WITHOUT BEHAVIORAL DISTURBANCE: ICD-10-CM

## 2020-09-03 DIAGNOSIS — Z29.9 ENCOUNTER FOR PROPHYLACTIC MEASURES, UNSPECIFIED: ICD-10-CM

## 2020-09-03 DIAGNOSIS — Z98.41 CATARACT EXTRACTION STATUS, RIGHT EYE: Chronic | ICD-10-CM

## 2020-09-03 DIAGNOSIS — D75.89 OTHER SPECIFIED DISEASES OF BLOOD AND BLOOD-FORMING ORGANS: ICD-10-CM

## 2020-09-03 DIAGNOSIS — G40.909 EPILEPSY, UNSPECIFIED, NOT INTRACTABLE, WITHOUT STATUS EPILEPTICUS: ICD-10-CM

## 2020-09-03 DIAGNOSIS — A41.9 SEPSIS, UNSPECIFIED ORGANISM: ICD-10-CM

## 2020-09-03 LAB
ALBUMIN SERPL ELPH-MCNC: 3.4 G/DL — SIGNIFICANT CHANGE UP (ref 3.3–5)
ALP SERPL-CCNC: 81 U/L — SIGNIFICANT CHANGE UP (ref 40–120)
ALT FLD-CCNC: 11 U/L — SIGNIFICANT CHANGE UP (ref 10–45)
ANION GAP SERPL CALC-SCNC: 10 MMOL/L — SIGNIFICANT CHANGE UP (ref 5–17)
APPEARANCE UR: ABNORMAL
APTT BLD: 29.6 SEC — SIGNIFICANT CHANGE UP (ref 27.5–35.5)
AST SERPL-CCNC: 28 U/L — SIGNIFICANT CHANGE UP (ref 10–40)
BACTERIA # UR AUTO: ABNORMAL
BASOPHILS # BLD AUTO: 0 K/UL — SIGNIFICANT CHANGE UP (ref 0–0.2)
BASOPHILS NFR BLD AUTO: 0 % — SIGNIFICANT CHANGE UP (ref 0–2)
BILIRUB SERPL-MCNC: 0.3 MG/DL — SIGNIFICANT CHANGE UP (ref 0.2–1.2)
BILIRUB UR-MCNC: NEGATIVE — SIGNIFICANT CHANGE UP
BUN SERPL-MCNC: 22 MG/DL — SIGNIFICANT CHANGE UP (ref 7–23)
CALCIUM SERPL-MCNC: 9.7 MG/DL — SIGNIFICANT CHANGE UP (ref 8.4–10.5)
CHLORIDE SERPL-SCNC: 100 MMOL/L — SIGNIFICANT CHANGE UP (ref 96–108)
CO2 SERPL-SCNC: 29 MMOL/L — SIGNIFICANT CHANGE UP (ref 22–31)
COLOR SPEC: YELLOW — SIGNIFICANT CHANGE UP
CREAT SERPL-MCNC: 0.59 MG/DL — SIGNIFICANT CHANGE UP (ref 0.5–1.3)
DIFF PNL FLD: ABNORMAL
EOSINOPHIL # BLD AUTO: 0 K/UL — SIGNIFICANT CHANGE UP (ref 0–0.5)
EOSINOPHIL NFR BLD AUTO: 0 % — SIGNIFICANT CHANGE UP (ref 0–6)
EPI CELLS # UR: 0 /HPF — SIGNIFICANT CHANGE UP
GLUCOSE SERPL-MCNC: 127 MG/DL — HIGH (ref 70–99)
GLUCOSE UR QL: NEGATIVE — SIGNIFICANT CHANGE UP
HCT VFR BLD CALC: 40.4 % — SIGNIFICANT CHANGE UP (ref 34.5–45)
HGB BLD-MCNC: 13 G/DL — SIGNIFICANT CHANGE UP (ref 11.5–15.5)
HYALINE CASTS # UR AUTO: 278 /LPF — HIGH (ref 0–2)
INR BLD: 1.12 RATIO — SIGNIFICANT CHANGE UP (ref 0.88–1.16)
KETONES UR-MCNC: NEGATIVE — SIGNIFICANT CHANGE UP
LEUKOCYTE ESTERASE UR-ACNC: ABNORMAL
LYMPHOCYTES # BLD AUTO: 0.42 K/UL — LOW (ref 1–3.3)
LYMPHOCYTES # BLD AUTO: 3.4 % — LOW (ref 13–44)
MANUAL SMEAR VERIFICATION: SIGNIFICANT CHANGE UP
MCHC RBC-ENTMCNC: 32.2 GM/DL — SIGNIFICANT CHANGE UP (ref 32–36)
MCHC RBC-ENTMCNC: 32.5 PG — SIGNIFICANT CHANGE UP (ref 27–34)
MCV RBC AUTO: 101 FL — HIGH (ref 80–100)
MONOCYTES # BLD AUTO: 1.68 K/UL — HIGH (ref 0–0.9)
MONOCYTES NFR BLD AUTO: 13.6 % — SIGNIFICANT CHANGE UP (ref 2–14)
NEUTROPHILS # BLD AUTO: 10.05 K/UL — HIGH (ref 1.8–7.4)
NEUTROPHILS NFR BLD AUTO: 78.8 % — HIGH (ref 43–77)
NEUTS BAND # BLD: 2.5 % — SIGNIFICANT CHANGE UP (ref 0–8)
NITRITE UR-MCNC: POSITIVE
PH UR: 7.5 — SIGNIFICANT CHANGE UP (ref 5–8)
PLAT MORPH BLD: NORMAL — SIGNIFICANT CHANGE UP
PLATELET # BLD AUTO: 215 K/UL — SIGNIFICANT CHANGE UP (ref 150–400)
POTASSIUM SERPL-MCNC: 4.4 MMOL/L — SIGNIFICANT CHANGE UP (ref 3.5–5.3)
POTASSIUM SERPL-SCNC: 4.4 MMOL/L — SIGNIFICANT CHANGE UP (ref 3.5–5.3)
PROT SERPL-MCNC: 7.3 G/DL — SIGNIFICANT CHANGE UP (ref 6–8.3)
PROT UR-MCNC: ABNORMAL
PROTHROM AB SERPL-ACNC: 13.2 SEC — SIGNIFICANT CHANGE UP (ref 10.6–13.6)
RBC # BLD: 4 M/UL — SIGNIFICANT CHANGE UP (ref 3.8–5.2)
RBC # FLD: 13.1 % — SIGNIFICANT CHANGE UP (ref 10.3–14.5)
RBC BLD AUTO: SIGNIFICANT CHANGE UP
RBC CASTS # UR COMP ASSIST: 6 /HPF — HIGH (ref 0–4)
SARS-COV-2 RNA SPEC QL NAA+PROBE: SIGNIFICANT CHANGE UP
SODIUM SERPL-SCNC: 139 MMOL/L — SIGNIFICANT CHANGE UP (ref 135–145)
SP GR SPEC: 1.02 — SIGNIFICANT CHANGE UP (ref 1.01–1.02)
UROBILINOGEN FLD QL: NEGATIVE — SIGNIFICANT CHANGE UP
VARIANT LYMPHS # BLD: 1.7 % — SIGNIFICANT CHANGE UP (ref 0–6)
WBC # BLD: 12.36 K/UL — HIGH (ref 3.8–10.5)
WBC # FLD AUTO: 12.36 K/UL — HIGH (ref 3.8–10.5)
WBC UR QL: 41 /HPF — HIGH (ref 0–5)

## 2020-09-03 PROCEDURE — 99223 1ST HOSP IP/OBS HIGH 75: CPT

## 2020-09-03 PROCEDURE — 99285 EMERGENCY DEPT VISIT HI MDM: CPT | Mod: CS

## 2020-09-03 PROCEDURE — 70450 CT HEAD/BRAIN W/O DYE: CPT | Mod: 26

## 2020-09-03 PROCEDURE — 72170 X-RAY EXAM OF PELVIS: CPT | Mod: 26

## 2020-09-03 PROCEDURE — 71045 X-RAY EXAM CHEST 1 VIEW: CPT | Mod: 26

## 2020-09-03 PROCEDURE — 93010 ELECTROCARDIOGRAM REPORT: CPT

## 2020-09-03 RX ORDER — CEFTRIAXONE 500 MG/1
1000 INJECTION, POWDER, FOR SOLUTION INTRAMUSCULAR; INTRAVENOUS EVERY 24 HOURS
Refills: 0 | Status: COMPLETED | OUTPATIENT
Start: 2020-09-04 | End: 2020-09-10

## 2020-09-03 RX ORDER — ENOXAPARIN SODIUM 100 MG/ML
40 INJECTION SUBCUTANEOUS DAILY
Refills: 0 | Status: DISCONTINUED | OUTPATIENT
Start: 2020-09-03 | End: 2020-09-18

## 2020-09-03 RX ORDER — SODIUM CHLORIDE 9 MG/ML
1000 INJECTION, SOLUTION INTRAVENOUS ONCE
Refills: 0 | Status: COMPLETED | OUTPATIENT
Start: 2020-09-03 | End: 2020-09-03

## 2020-09-03 RX ORDER — CEFTRIAXONE 500 MG/1
1000 INJECTION, POWDER, FOR SOLUTION INTRAMUSCULAR; INTRAVENOUS ONCE
Refills: 0 | Status: COMPLETED | OUTPATIENT
Start: 2020-09-03 | End: 2020-09-03

## 2020-09-03 RX ORDER — RUFINAMIDE 40 MG/ML
800 SUSPENSION ORAL
Refills: 0 | Status: DISCONTINUED | OUTPATIENT
Start: 2020-09-03 | End: 2020-09-05

## 2020-09-03 RX ORDER — ACETAMINOPHEN 500 MG
650 TABLET ORAL ONCE
Refills: 0 | Status: COMPLETED | OUTPATIENT
Start: 2020-09-03 | End: 2020-09-03

## 2020-09-03 RX ADMIN — CEFTRIAXONE 100 MILLIGRAM(S): 500 INJECTION, POWDER, FOR SOLUTION INTRAMUSCULAR; INTRAVENOUS at 19:10

## 2020-09-03 RX ADMIN — Medication 650 MILLIGRAM(S): at 18:30

## 2020-09-03 RX ADMIN — SODIUM CHLORIDE 1000 MILLILITER(S): 9 INJECTION, SOLUTION INTRAVENOUS at 18:02

## 2020-09-03 RX ADMIN — Medication 650 MILLIGRAM(S): at 18:02

## 2020-09-03 NOTE — ED PROVIDER NOTE - CLINICAL SUMMARY MEDICAL DECISION MAKING FREE TEXT BOX
88F presenting with weakness, fall (lowered to ground with no head trauma) and found to be febrile and tachycardic in ED.  likely infectious - possible uti vs pna vs viral.  Will check labs, ua, cxr, give fluids, abx, tylenol and plan for admission.  - Maria Victoria Sorensen DO

## 2020-09-03 NOTE — H&P ADULT - HISTORY OF PRESENT ILLNESS
Patient has dementia and is unable to provide history. Therefore HPI collected from her  Mr. 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 The patient reportedly has been having worsening gait instability and generalized weakness over last week. She was evaluated for typical check up with PCP 6d prior on 8/28/20 and on 9/1 they were informed she had UTI and was prescribed ciprofloxacin which was picked up but not started. She reportedly has felt warm but had no complaints to . On day of presentation, she was assisted to use the bathroom and then had difficulty getting off of the toilet and  had to lift her off. Once on her feet she appeared to be stumbling and generally weak and was lowered to the floor as she was about to fall backward. No reported head strike but patient could not get up from ground and therefore ems called. Of note patient has been working with PT for months for gait instability and last week she was found on kitchen floor by  but she could not remember what occurred. Last seizure Feb2020, and more recently has switched from divalproex back to Banzel 3d ago.  attributes divalproex and new memantine for her depression and therefore has stopped medications (memantine is still Rx by neurologist). Patient reports penicillin allergy as a child and  reports no known rash or anaphylaxis reaction.    In the ED, .5, 152/75, 120, 18 99%RA  s/p ceftriaxone 1gx1, 1L LR, acetaminophen 650

## 2020-09-03 NOTE — ED ADULT NURSE NOTE - PMH
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

## 2020-09-03 NOTE — H&P ADULT - ASSESSMENT
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

## 2020-09-03 NOTE — H&P ADULT - NSHPREVIEWOFSYSTEMS_GEN_ALL_CORE
limited due to patients cognitive impairment however answered as follows  CONSTITUTIONAL: No fever, no chills  EYES: No eye pain, no acute blindness  Mouth: no pain in mouth, no cuts  RESPIRATORY: No cough, No sob  CARDIOVASCULAR: No CP, no palpitations  GASTROINTESTINAL: no abdominal pain, no n/v/d  GENITOURINARY: No dysuria, no hematuria  Heme: No easy bruising, no swelling of neck  NEUROLOGICAL: No seizure, No acute paralysis  SKIN: No itching, no rashes  MUSCULOSKELETAL: No acute joint pain, no joint swelling

## 2020-09-03 NOTE — ED ADULT NURSE REASSESSMENT NOTE - NS ED NURSE REASSESS COMMENT FT1
Pt resting comfortably in bed. Pt states relief from pain at this time. No seizure activity noted. Pt covered in blanket and comfortable. Pt resting comfortably in bed. Pt states relief from pain at this time. No seizure activity noted. Pt covered in blanket and comfortable. Pt given dinner to eat. Pt was placed on bed pan and voided.

## 2020-09-03 NOTE — ED PROVIDER NOTE - PHYSICAL EXAMINATION
GEN: NAD, awake, eyes open spontaneously  ENT: NCAT  CHEST/LUNGS: Non-tachypneic, CTAB, bilateral breath sounds  CARDIAC: tachy. S1S2.,   ABDOMEN: Soft, NTND, No rebound/guarding  MSK: Slight limited ROM of right knee 2/2 pain. No swelling or erythema to knee. Ecchymosis to left knee, FROM. Hips non tender with FROM.   SKIN: Bruise of left knee.   NEURO: CN grossly intact, normal coordination, no focal motor or sensory deficits GEN: NAD, awake, eyes open spontaneously  HEENT: NCAT, PERRL, neck supple  CHEST/LUNGS: Non-tachypneic, CTAB, bilateral breath sounds  CARDIAC: tachy. S1S2.,   ABDOMEN: Soft, NTND, No rebound/guarding  MSK: Slight limited ROM of right knee 2/2 pain. No swelling or erythema to knee. Ecchymosis to left knee, FROM. Hips non tender with FROM. no midline vertebral ttp  SKIN: Bruise of left knee.   NEURO: CN grossly intact, normal coordination, no focal motor or sensory deficits

## 2020-09-03 NOTE — ED ADULT NURSE NOTE - OBJECTIVE STATEMENT
88 year old female brought in by EMS s/p fall. As per EMS pt was in the bathroom when she fell. As per EMS he legs became weak and she started to fall the  caught her but was not able to get her up and had to wait about and hour for a neighbor to come and pick her up. Pt is Aox2 on arrival confused about date. Pt has a PMH of seizures secondary to meningitis she had as a teenager and dementia. Pt denies having any seizure symptoms before he legs became weak. Pt denies any LOC, or head trauma. Pt denies any vomiting, no nausea. Pt presents with JASON leg pain, ecchymosis on right hip noted. Pt is able to move all extremities. As per Pt she normally ambulates with a walker or cane. Sacrum has blanchable redness, no breakdown. Breathing is clear and unlabored, denies any cough. Pt denies any chest pain or pressure. Pt presents wearing a diaper unsure if incontinent. Pt reports she had a subjective fever a week ago. 88 year old female brought in by EMS s/p fall. As per EMS pt was in the bathroom when she fell. As per EMS he legs became weak and she started to fall the  caught her but was not able to get her up and had to wait about and hour for a neighbor to come and pick her up. Pt is Aox2 on arrival confused about date. Pt has a PMH of seizures secondary to meningitis she had as a teenager and dementia. Unknown AC use. Pt denies having any seizure symptoms before he legs became weak. Pt denies any LOC, or head trauma. Pt denies any vomiting, no nausea. Pt presents with JASON leg pain, ecchymosis on right hip noted. Pt is able to move all extremities. As per Pt she normally ambulates with a walker or cane. Sacrum has blanchable redness, no breakdown. Breathing is clear and unlabored, denies any cough. Pt denies any chest pain or pressure. Pt presents wearing a diaper unsure if incontinent. Pt reports she had a subjective fever a week ago.

## 2020-09-03 NOTE — H&P ADULT - PROBLEM/PLAN-1
Pt's wife Melia called, states she does not need letter from Dr. Quevedo's office. However, has questions regarding how pt is to take Enbrel pre- and post-surgery. Reviewed the below recommendations. Melia voiced understanding and agrees with plan. All questions answered to her satisfaction. Will call with further questions or concerns.    ---- Dr. Quevedo Surgery Recommendations ----  SURGERY: Hold 1 dose (or at least 1 week) prior to upcoming surgery. May resume medication 2 weeks after surgery if showing good signs of healing, no signs of infection or open wounds, sutures have been removed, and reviewed and OK’d by surgeon.      DISPLAY PLAN FREE TEXT

## 2020-09-03 NOTE — H&P ADULT - NSICDXPASTMEDICALHX_GEN_ALL_CORE_FT
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

## 2020-09-03 NOTE — H&P ADULT - ATTENDING COMMENTS
Patient assigned to me by night hospitalist in charge for management and care for patient for this evening only. Care to be resumed by [facility] day hospitalist at 08:00 in the morning and thereafter.     Clarke Peralta MD  Medicine Attending  Department of Hospital Medicine  pager: [phone] (available from 20:00 to 08:00)

## 2020-09-03 NOTE — H&P ADULT - NSHPPHYSICALEXAM_GEN_ALL_CORE
Vital Signs Last 24 Hrs  T(C): 36.5 (03 Sep 2020 20:35), Max: 38.1 (03 Sep 2020 17:43)  T(F): 97.7 (03 Sep 2020 20:35), Max: 100.5 (03 Sep 2020 17:43)  HR: 95 (03 Sep 2020 20:35) (68 - 120)  BP: 127/82 (03 Sep 2020 20:35) (127/82 - 152/75)  RR: 18 (03 Sep 2020 20:35) (18 - 18)  SpO2: 98% (03 Sep 2020 20:35) (98% - 99%) on RA    GENERAL: nontoxic, NAD, well-developed  HEAD:  Atraumatic, Normocephalic  EYES: EOMI, PERRL  Mouth: MMM, no lesions  NECK: Supple, no appreciable masses  Lung: normal work of breathing, cta b/l  Chest: S1&S2+, rrr, mild murmur present  ABDOMEN: bs+, soft, nt, nd, no appreciable masses  : No shah catheter, no CVA tenderness  EXTREMITIES:  radial pulse present b/l, PT present b/l, no pitting edema present  Neuro: A&Ox1(name) patient demonstrates anterograde amnesia and reports cannot remember why she came to the hospital, no flaccid paralysis in extremities appreciated  SKIN: warm and dry, no visible purulence in exposed areas

## 2020-09-03 NOTE — H&P ADULT - PROBLEM SELECTOR PLAN 1
----- Message from Piedad López sent at 4/23/2018 11:35 AM CDT -----  Contact: todd reardon [phone]            Name of Who is Calling: todd reardon [phone]      What is the request in detail: wants to know to continue medication or not. Call wife      Can the clinic reply by [facility]: no      What Number to Call Back [facility]: todd reardon [phone]                                        
He should complete 10 days of colchicine  
Pt wife inform that medication that pt has to take Colchine for the next 10 days until he finish.She agrees and verbalize and will relay message to her .  
- fever, tachycardia, leukocytosis, UA+  - VS much improved and nontoxic on exam since ED initiated therapy.  - continue with ceftriaxone. No evidence of allergic reaction. Family reports distant penicillin allergy without known reaction with rash or anaphylaxis.  - blood and urine culture collected  - sufficient IV fluid given by ED 1L, appers euvolemic and VS have corrected. given age favor conservative fluid bolus as needed based upon VS.  - lactate in am, cbc in am, bmp in am

## 2020-09-03 NOTE — H&P ADULT - PROBLEM/PLAN-3
[Time Spent: ___ minutes] : I have spent [unfilled] minutes of time on the encounter. DISPLAY PLAN FREE TEXT

## 2020-09-03 NOTE — H&P ADULT - NSHPLABSRESULTS_GEN_ALL_CORE
Personally reviewed available labs, imaging and ekg  CBC Full  -  ( 03 Sep 2020 17:57 )  WBC Count : 12.36 K/uL  RBC Count : 4.00 M/uL  Hemoglobin : 13.0 g/dL  Hematocrit : 40.4 %  Platelet Count - Automated : 215 K/uL  Mean Cell Volume : 101.0 fl  Mean Cell Hemoglobin : 32.5 pg  Mean Cell Hemoglobin Concentration : 32.2 gm/dL  Auto Neutrophil # : 10.05 K/uL  Auto Lymphocyte # : 0.42 K/uL  Auto Monocyte # : 1.68 K/uL  Auto Eosinophil # : 0.00 K/uL  Auto Basophil # : 0.00 K/uL  Auto Neutrophil % : 78.8 %  Auto Lymphocyte % : 3.4 %  Auto Monocyte % : 13.6 %  Auto Eosinophil % : 0.0 %  Auto Basophil % : 0.0 %  09-03  139  |  100  |  22  ----------------------------<  127<H>  4.4   |  29  |  0.59  Ca    9.7      03 Sep 2020 17:57  TPro  7.3  /  Alb  3.4  /  TBili  0.3  /  DBili  x   /  AST  28  /  ALT  11  /  AlkPhos  81  09-03  PT/INR - ( 03 Sep 2020 17:57 )   PT: 13.2 sec;   INR: 1.12 ratio    PTT - ( 03 Sep 2020 17:57 )  PTT:29.6 sec  Urinalysis + Microscopic Examination (09.03.20 @ 17:57)    Glucose Qualitative, Urine: Negative    Blood, Urine: Moderate    Ketone - Urine: Negative    Bilirubin: Negative    Color: Yellow    Urine Appearance: Slightly Turbid    Urobilinogen: Negative    Specific Gravity: 1.020    Protein, Urine: Trace    pH Urine: 7.5    Leukocyte Esterase Concentration: Large    Nitrite: Positive    Red Blood Cell - Urine: 6 /hpf    White Blood Cell - Urine: 41 /HPF    Epithelial Cells: 0 /hpf    Hyaline Casts: 278 /lpf    Bacteria: Many    Imaging:  cxr does not demonstrate lobar opacification  < from: CT Head No Cont (09.03.20 @ 18:15) >    IMPRESSION:  No evidence of acute transcortical infarct, acute intracranial hemorrhage, or mass effect.    < end of copied text >      EKG: sinus tachycardia 105 no stemi appreciated

## 2020-09-03 NOTE — ED PROVIDER NOTE - OBJECTIVE STATEMENT
88 year old F with pmhx of seizures s/p meningitis, HLD, IBS presents to ED c/o leg pain s/p fall today. Pt was walking out of the bathroom when she began to feel dizzy, pt was caught by her  lowered down by . Pt was on the ground for roughly 1 hour as  had some trouble getting her up before EMS. Denies CP, abd pain, cough, dysuria, nausea, vomiting, diarrhea, hip pain. Allergic to penicillin and PCN. 88 year old F with pmhx of seizures s/p meningitis, HLD, IBS presents to ED c/o leg pain s/p fall today. Pt was walking out of the bathroom when she began to feel dizzy, pt was caught by her  lowered down by . Pt was on the ground for roughly 1 hour as  had some trouble getting her up before EMS. Denies CP, abd pain, cough, dysuria, nausea, vomiting, diarrhea, hip pain. 88 year old F with pmhx of seizures s/p meningitis, HLD, IBS presents to ED c/o leg pain s/p fall today. Pt was walking out of the bathroom when she began to feel dizzy, pt was caught by her  lowered down by . no head trauma.  Pt was on the ground for roughly 1 hour as  had some trouble getting her up before EMS. Denies CP, abd pain, cough, dysuria, nausea, vomiting, diarrhea, hip pain.  Pt found to be febrile in ER.

## 2020-09-04 DIAGNOSIS — N39.0 URINARY TRACT INFECTION, SITE NOT SPECIFIED: ICD-10-CM

## 2020-09-04 LAB
ANION GAP SERPL CALC-SCNC: 10 MMOL/L — SIGNIFICANT CHANGE UP (ref 5–17)
BASOPHILS # BLD AUTO: 0.04 K/UL — SIGNIFICANT CHANGE UP (ref 0–0.2)
BASOPHILS NFR BLD AUTO: 0.4 % — SIGNIFICANT CHANGE UP (ref 0–2)
BUN SERPL-MCNC: 15 MG/DL — SIGNIFICANT CHANGE UP (ref 7–23)
CALCIUM SERPL-MCNC: 9.4 MG/DL — SIGNIFICANT CHANGE UP (ref 8.4–10.5)
CHLORIDE SERPL-SCNC: 99 MMOL/L — SIGNIFICANT CHANGE UP (ref 96–108)
CO2 SERPL-SCNC: 28 MMOL/L — SIGNIFICANT CHANGE UP (ref 22–31)
CREAT SERPL-MCNC: 0.53 MG/DL — SIGNIFICANT CHANGE UP (ref 0.5–1.3)
EOSINOPHIL # BLD AUTO: 0.03 K/UL — SIGNIFICANT CHANGE UP (ref 0–0.5)
EOSINOPHIL NFR BLD AUTO: 0.3 % — SIGNIFICANT CHANGE UP (ref 0–6)
FOLATE SERPL-MCNC: 15.2 NG/ML — SIGNIFICANT CHANGE UP
GLUCOSE SERPL-MCNC: 99 MG/DL — SIGNIFICANT CHANGE UP (ref 70–99)
HCT VFR BLD CALC: 38 % — SIGNIFICANT CHANGE UP (ref 34.5–45)
HGB BLD-MCNC: 12.7 G/DL — SIGNIFICANT CHANGE UP (ref 11.5–15.5)
IMM GRANULOCYTES NFR BLD AUTO: 0.6 % — SIGNIFICANT CHANGE UP (ref 0–1.5)
LACTATE SERPL-SCNC: 1.4 MMOL/L — SIGNIFICANT CHANGE UP (ref 0.7–2)
LYMPHOCYTES # BLD AUTO: 1.23 K/UL — SIGNIFICANT CHANGE UP (ref 1–3.3)
LYMPHOCYTES # BLD AUTO: 11.1 % — LOW (ref 13–44)
MAGNESIUM SERPL-MCNC: 1.6 MG/DL — SIGNIFICANT CHANGE UP (ref 1.6–2.6)
MCHC RBC-ENTMCNC: 32.5 PG — SIGNIFICANT CHANGE UP (ref 27–34)
MCHC RBC-ENTMCNC: 33.4 GM/DL — SIGNIFICANT CHANGE UP (ref 32–36)
MCV RBC AUTO: 97.2 FL — SIGNIFICANT CHANGE UP (ref 80–100)
MONOCYTES # BLD AUTO: 1.33 K/UL — HIGH (ref 0–0.9)
MONOCYTES NFR BLD AUTO: 12 % — SIGNIFICANT CHANGE UP (ref 2–14)
NEUTROPHILS # BLD AUTO: 8.37 K/UL — HIGH (ref 1.8–7.4)
NEUTROPHILS NFR BLD AUTO: 75.6 % — SIGNIFICANT CHANGE UP (ref 43–77)
NRBC # BLD: 0 /100 WBCS — SIGNIFICANT CHANGE UP (ref 0–0)
PHOSPHATE SERPL-MCNC: 2.8 MG/DL — SIGNIFICANT CHANGE UP (ref 2.5–4.5)
PLATELET # BLD AUTO: 207 K/UL — SIGNIFICANT CHANGE UP (ref 150–400)
POTASSIUM SERPL-MCNC: 3.9 MMOL/L — SIGNIFICANT CHANGE UP (ref 3.5–5.3)
POTASSIUM SERPL-SCNC: 3.9 MMOL/L — SIGNIFICANT CHANGE UP (ref 3.5–5.3)
RBC # BLD: 3.91 M/UL — SIGNIFICANT CHANGE UP (ref 3.8–5.2)
RBC # FLD: 12.7 % — SIGNIFICANT CHANGE UP (ref 10.3–14.5)
SARS-COV-2 IGG SERPL QL IA: NEGATIVE — SIGNIFICANT CHANGE UP
SARS-COV-2 IGM SERPL IA-ACNC: <0.1 INDEX — SIGNIFICANT CHANGE UP
SODIUM SERPL-SCNC: 137 MMOL/L — SIGNIFICANT CHANGE UP (ref 135–145)
VIT B12 SERPL-MCNC: 1171 PG/ML — SIGNIFICANT CHANGE UP (ref 232–1245)
WBC # BLD: 11.07 K/UL — HIGH (ref 3.8–10.5)
WBC # FLD AUTO: 11.07 K/UL — HIGH (ref 3.8–10.5)

## 2020-09-04 RX ADMIN — RUFINAMIDE 800 MILLIGRAM(S): 40 SUSPENSION ORAL at 05:11

## 2020-09-04 RX ADMIN — ENOXAPARIN SODIUM 40 MILLIGRAM(S): 100 INJECTION SUBCUTANEOUS at 12:38

## 2020-09-04 RX ADMIN — CEFTRIAXONE 100 MILLIGRAM(S): 500 INJECTION, POWDER, FOR SOLUTION INTRAMUSCULAR; INTRAVENOUS at 20:50

## 2020-09-04 RX ADMIN — RUFINAMIDE 800 MILLIGRAM(S): 40 SUSPENSION ORAL at 17:58

## 2020-09-04 NOTE — CONSULT NOTE ADULT - SUBJECTIVE AND OBJECTIVE BOX
[facility], Division of Infectious Diseases  STALIN Mcneal, DEIRDRE Goodman  [phone]    KANG SCHWARTZ  88y, Female  [id number]    HPI--  HPI:  Patient has dementia and is unable to provide history. Therefore HPI collected from her  Mr. 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 The patient reportedly has been having worsening gait instability and generalized weakness over last week. She was evaluated for typical check up with PCP 6d prior on 8/28/20 and on 9/1 they were informed she had UTI and was prescribed ciprofloxacin which was picked up but not started. She reportedly has felt warm but had no complaints to . On day of presentation, she was assisted to use the bathroom and then had difficulty getting off of the toilet and  had to lift her off. Once on her feet she appeared to be stumbling and generally weak and was lowered to the floor as she was about to fall backward. No reported head strike but patient could not get up from ground and therefore ems called. Of note patient has been working with PT for months for gait instability and last week she was found on kitchen floor by  but she could not remember what occurred. Last seizure Feb2020, and more recently has switched from divalproex back to Banzel 3d ago.  attributes divalproex and new memantine for her depression and therefore has stopped medications (memantine is still Rx by neurologist). Patient reports penicillin allergy as a child and  reports no known rash or anaphylaxis reaction.    In the ED, .5, 152/75, 120, 18 99%RA  s/p ceftriaxone 1gx1, 1L LR, acetaminophen 650 (03 Sep 2020 22:50)      PMH/PSH--  Dementia  Memory loss due to medical condition  Hypercholesterolemia  IBS (irritable bowel syndrome)  Seizure disorder  Meningitis  Cataract extraction status, right      Allergies--      Medications--  Antibiotics: cefTRIAXone   IVPB 1000 milliGRAM(s) IV Intermittent every 24 hours    Immunologic:   Other: enoxaparin Injectable  rufinamide      Social History--  EtOH: denies ***  Tobacco: denies ***  Drug Use: denies ***    Family/Marital History--  No pertinent family history in first degree relatives    Remainder not relevant to clinical concern.    Travel/Environmental/Occupational History:  *** insert T/E/O Hx ***    Review of Systems:  A >=10-point review of systems was obtained.     Pertinent positives and negatives--  Constitutional: No fevers. No Chills. No Rigors.   Eyes:  ENMT:  Cardiovascular: No chest pain. No palpitations.  Respiratory: No shortness of breath. No cough.  Gastrointestinal: No nausea or vomiting. No diarrhea or constipation.   Genitourinary:  Musculoskeletal:  Skin:  Neurologic:  Psychiatric: Pleasant. Appropriate affect.  Endocrine:  Heme/Lymphatic:  Allergy/Immunologic:    Review of systems otherwise negative except as previously noted.    Physical Exam--  Vital Signs: T(F): 97.6 (09-04-20 @ 04:36), Max: 100.5 (09-03-20 @ 17:43)  HR: 86 (09-04-20 @ 04:36)  BP: 134/81 (09-04-20 @ 04:36)  RR: 18 (09-04-20 @ 04:36)  SpO2: 98% (09-04-20 @ 04:36)  Wt(kg): --  General: Nontoxic-appearing Female in no acute distress.  HEENT: AT/NC. PERRL. EOMI. Anicteric. Conjunctiva pink and moist. Oropharynx clear. Dentition fair.  Neck: Not rigid. No sense of mass.  Nodes: None palpable.  Lungs: Clear bilaterally without rales, wheezing or rhonchi  Heart: Regular rate and rhythm. No Murmur. No rub. No gallop. No palpable thrill.  Abdomen: Bowel sounds present and normoactive. Soft. Nondistended. Nontender. No sense of mass. No organomegaly.  Back: No spinal tenderness. No costovertebral angle tenderness.   Extremities: No cyanosis or clubbing. No edema.   Skin: Warm. Dry. Good turgor. No rash. No vasculitic stigmata.  Psychiatric: Appropriate affect and mood for situation.         Laboratory & Imaging Data--  CBC                        13.0   12.36 )-----------( 215      ( 03 Sep 2020 17:57 )             40.4       Chemistries  09-03    139  |  100  |  22  ----------------------------<  127<H>  4.4   |  29  |  0.59    Ca    9.7      03 Sep 2020 17:57    TPro  7.3  /  Alb  3.4  /  TBili  0.3  /  DBili  x   /  AST  28  /  ALT  11  /  AlkPhos  81  09-03      Culture Data [facility], Division of Infectious Diseases  STALIN Mcneal, DEIRDRE Goodman  [phone]    KANG VILLAFANA  88y, Female  [id number]      HPI:pt poor historian history per  and chart    88F h/p, 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    states she fell a few time and her legs were rigid.  They had seen Dr Ramirez a week ago and her ua ++ so was given antibx, but  did not give it to her.    He states in January, she fell and went to PT.  Then he noted problems with her cognition, could not remember birthdays where she was.  And she saw a neurologist in Feb.  WAs recommended aricept for cognitive decline and dementia, she did not want to take it.  Also new med divalproex was prescribed then.  She was continuing with PT.   then a few months later her cognition improved again.    BUt most recently few weeks cognition poor, and gait instability with rigid legs and falls.    denies cough, no diarrhea, no sick contacts, per  she did not c/o pain or any urinary discomfort   and no travel  no pets at home.      PMH/PSH--  Dementia  Memory loss due to medical condition  Hypercholesterolemia  IBS (irritable bowel syndrome)  Seizure disorder  Meningitis  Cataract extraction status, right      Allergies--PCN-- ???      Medications--  Antibiotics: cefTRIAXone   IVPB 1000 milliGRAM(s) IV Intermittent every 24 hours    Immunologic:   Other: enoxaparin Injectable  rufinamide      Social History--  EtOH: denies ***  Tobacco: denies ***  Drug Use: denies ***    Family/Marital History--       Remainder not relevant to clinical concern.    Travel/Environmental/Occupational History:       Review of Systems:  A >=10-point review of systems was obtained.   unable to obtain  Review of systems otherwise negative except as previously noted.    Physical Exam--  Vital Signs: T(F): 97.6 (09-04-20 @ 04:36), Max: 100.5 (09-03-20 @ 17:43)  HR: 86 (09-04-20 @ 04:36)  BP: 134/81 (09-04-20 @ 04:36)  RR: 18 (09-04-20 @ 04:36)  SpO2: 98% (09-04-20 @ 04:36)  Wt(kg): --  General: Nontoxic-appearing Female in no acute distress.  HEENT: AT/NC.  Anicteric.   Neck: Not rigid. No sense of mass.  Nodes: None palpable.  Lungs: Clear bilaterally without rales, wheezing or rhonchi  Heart: Regular rate and rhythm. + Murmur  Abdomen: Bowel sounds present and normoactive. Soft. Nondistended. Nontender.   Back: No spinal tenderness. No costovertebral angle tenderness.   Extremities: No cyanosis or clubbing. No edema.   Skin: Warm. Dry. Good turgor. No rash. No vasculitic stigmata.  Psychiatric: pleasant confused        Laboratory & Imaging Data--  CBC                        13.0   12.36 )-----------( 215      ( 03 Sep 2020 17:57 )             40.4       Chemistries  09-03    139  |  100  |  22  ----------------------------<  127<H>  4.4   |  29  |  0.59    Ca    9.7      03 Sep 2020 17:57    TPro  7.3  /  Alb  3.4  /  TBili  0.3  /  DBili  x   /  AST  28  /  ALT  11  /  AlkPhos  81  09-03      Culture Data      Urinalysis + Microscopic Examination (09.03.20 @ 17:57)    Urine Appearance: Slightly Turbid    Urobilinogen: Negative    Specific Gravity: 1.020    Protein, Urine: Trace    pH Urine: 7.5    Leukocyte Esterase Concentration: Large    Nitrite: Positive    Ketone - Urine: Negative    Bilirubin: Negative    Color: Yellow    Glucose Qualitative, Urine: Negative    Blood, Urine: Moderate    Red Blood Cell - Urine: 6 /hpf    White Blood Cell - Urine: 41 /HPF    Epithelial Cells: 0 /hpf    Hyaline Casts: 278 /lpf    Bacteria: Many        < from: Xray Pelvis AP only (09.03.20 @ 19:25) >    EXAM:  PELVIS AP ONLY                            PROCEDURE DATE:  09/03/2020            INTERPRETATION:  CLINICAL INFORMATION: Pain.    TECHNIQUE: Single view of the pelvis    COMPARISON: No similar prior studies available for comparison.    FINDINGS:  There is mild bilateral hip joint space narrowing. Marginal spurring is seen at the acetabulum. Degenerative changes of the lower lumbar spine. Marked narrowing productive change at the sacroiliac joints. No acute fracture or dislocation.    IMPRESSION:  No acute fracture or dislocation.    < end of copied text >      < from: CT Head No Cont (09.03.20 @ 18:15) >  EXAM:  CT BRAIN                            PROCEDURE DATE:  09/03/2020            INTERPRETATION:  CLINICAL INDICATION: Fall    TECHNIQUE: Axial CT scanning of the brain was obtained from the skull base to the vertex without the administration of intravenous contrast. Reformatted coronal and sagittal images were subsequently obtained and reviewed.    COMPARISON: 2/29/2016    FINDINGS:  There is no CT evidence of acute transcortical infarct. Age-related involutional changes and chronic microvascular ischemic changes.    There is no hydrocephalus, mass effect, or acute intracranial hemorrhage. No extra-axial collection. Basal cisterns are patent.    The visualized paranasal sinuses and mastoid air cells are clear.    The calvarium is intact.    Bilateral cataract surgery.    IMPRESSION:  No evidence of acute transcortical infarct, acute intracranial hemorrhage, or mass effect.      < end of copied text >        < from: Xray Chest 1 View AP/PA (09.03.20 @ 18:14) >    EXAM:  XR CHEST AP OR PA 1V                            PROCEDURE DATE:  09/03/2020            INTERPRETATION:  CLINICAL INDICATION: Sepsis    TECHNIQUE: Single frontal, portable view of the chest was obtained.    COMPARISON: Chest radiograph 12/13/2004    FINDINGS:    The size of the cardiomediastinal silhouette is within normal limits.  There are no focal pulmonary consolidations.  There is no pneumothorax or pleural effusion.    IMPRESSION:  Clear lungs.    < end of copied text >

## 2020-09-04 NOTE — CONSULT NOTE ADULT - PROBLEM SELECTOR RECOMMENDATION 9
cont ceftriaxone  until cx ID and sensitivity back  f/u blood cx  duration will depend on blood cx data and clinical course  upper tract v cystitis  trend wbc and temp curve pt unable to give history   ua is positive  cont ceftriaxone  until cx ID and sensitivity back  f/u blood cx  duration will depend on blood cx data and clinical course  upper tract v cystitis    trend wbc and temp curve

## 2020-09-04 NOTE — CONSULT NOTE ADULT - ASSESSMENT
This is an 86 year old female with seizures since was 18 years old from meningitis.  Recentl changed back to banzel since the Depakote the  wanted because it was cheaper corey her more confused .  Would increase the banzel to 400 mg in am and 600 mg in pm    eeg monitoring This is an 86 year old female with seizures since was 18 years old from meningitis.  Recentl changed back to banzel since the Depakote the  wanted because it was cheaper corey her more confused .  Would increase the banzel to 600 mg twice a day not 800 mg   eeg monitoring   patient 	sensitive to medications

## 2020-09-04 NOTE — CONSULT NOTE ADULT - SUBJECTIVE AND OBJECTIVE BOX
Neurology consult    KANG [id number]    HPI:  Patient has dementia and is unable to provide history. Therefore HPI collected from her  Mr. Johnny Schwartz via telephone    88F w/ hx of dementia, pediatric meningitis c/b mild cognitive impairment, eclampsia/seizure disorder (last seizure ), eczema presents to [facility] for evaluation of generalized weakness. The patient reportedly has been having worsening gait instability and generalized weakness over last week. She was evaluated for typical check up with PCP 6d prior on 20 and on  they were informed she had UTI and was prescribed ciprofloxacin which was picked up but not started. She reportedly has felt warm but had no complaints to . On day of presentation, she was assisted to use the bathroom and then had difficulty getting off of the toilet and  had to lift her off. Once on her feet she appeared to be stumbling and generally weak and was lowered to the floor as she was about to fall backward. No reported head strike but patient could not get up from ground and therefore ems called. Of note patient has been working with PT for months for gait instability and last week she was found on kitchen floor by  but she could not remember what occurred. Last seizure , and more recently has switched from divalproex back to Banzel 3d ago.  attributes divalproex and new memantine for her depression and therefore has stopped medications (memantine is still Rx by neurologist). Patient reports penicillin allergy as a child and  reports no known rash or anaphylaxis reaction.    In the ED, .5, 152/75, 120, 18 99%RA  s/p ceftriaxone 1gx1, 1L LR, acetaminophen 650 (03 Sep 2020 22:50)     patient has had seizures since she was 18 from meningitis,  She had done well with banzel but  thought it was to expensive,  She was changes to depakote.    She became more confused so he wanted to go back to banzel .  She was recently changed to banzel.  Today her  believes she had a seizure.  PAST MEDICAL HISTORY  Dementia  Memory loss due to medical condition  Hypercholesterolemia  IBS (irritable bowel syndrome)  Seizure disorder  Meningitis      PAST SURGICAL HISTORY  Cataract extraction status, right        MEDICATIONS    cefTRIAXone   IVPB 1000 milliGRAM(s) IV Intermittent every 24 hours  enoxaparin Injectable 40 milliGRAM(s) SubCutaneous daily  rufinamide 800 milliGRAM(s) Oral two times a day         FAMILY HISTORY  No history of dementia, strokes, or seizures   FAMILY HISTORY:  No pertinent family history in first degree relatives      SOCIAL HISTORY -- No history of tobacco or alcohol use     Allergies     reports severe allergy to PEAS and NUTS (Hives)  Nuts (Hives)  penicillin (Unknown)    Intolerances        Height (cm): 162.6 ( @ 20:35)  Weight (kg): 45.4 ( @ 20:35)  BMI (kg/m2): 17.2 ( @ 20:35)    Vital Signs Last 24 Hrs  T(C): 36.6 (04 Sep 2020 13:19), Max: 38.1 (03 Sep 2020 17:43)  T(F): 97.9 (04 Sep 2020 13:19), Max: 100.5 (03 Sep 2020 17:43)  HR: 102 (04 Sep 2020 13:19) (68 - 120)  BP: 134/70 (04 Sep 2020 13:19) (127/82 - 152/75)  BP(mean): --  RR: 16 (04 Sep 2020 13:19) (16 - 18)  SpO2: 94% (04 Sep 2020 13:19) (94% - 99%)      REVIEW OF SYSTEMS:    Constitutional: No fever, chills, fatigue, weakness  Eyes: no eye pain, visual disturbances, or discharge  ENT:  No difficulty hearing, tinnitus, vertigo; No sinus or throat pain  Neck: No pain or stiffness  Respiratory: No cough, dyspnea, wheezing   Cardiovascular: No chest pain, palpitations,   Gastrointestinal: No abdominal or epigastric pain. No nausea, vomiting  No diarrhea or constipation.   Genitourinary: No dysuria, frequency, hematuria or incontinence  Neurological: No headaches, lightheadedness, vertigo, numbness or tremors  Psychiatric: No depression, anxiety, mood swings or difficulty sleeping  Musculoskeletal: No joint pain or swelling; No muscle, back or extremity pain  Skin: No itching, burning, rashes or lesions   Lymph Nodes: No enlarged glands  Endocrine: No heat or cold intolerance; No hair loss, No h/o diabetes or thyroid dysfunction  Allergy and Immunologic: No hives or eczema    On Neurological Examination:    Mental Status - Patient is alert, awake, oriented X1  baseline. .     Follows commands well and able to answer questions appropriately. Mood and affect  normal  Follow simple commands  Speech -   Fluent  no   Dysarthria  no  Aphasia                              Cranial Nerves - Pupils  equal and reactive to light,  no edema   extraocular eye movements intact,facial symmetry, hearing intact,   palate symmetric. tongue midline     Motor Exam - poor efford  Right upper 4/5 throughout  Left upper 4/5 throughout  Right lower 4/5 throughout  Left lower 4/5 throughout  Muscle tone - is normal all over. No asymmetry is seen.      Sensory    Bilateral intact to light touch    GENERAL Exam:     Nontoxic , No Acute Distress   	  HEENT:  normocephalic, atraumatic  		  LUNGS:	Clear bilaterally  No Wheeze    	  HEART:	 Normal S1S2   No murmur RRR        	  GI/ ABDOMEN:  Soft  Non tender    EXTREMITIES:   No Edema  No Clubbing  No Cyanosis No Edema    MUSCULOSKELETAL: Normal Range of Motion  	   SKIN:      Normal   No Ecchymosis     VASCULAR: no carotid brui          LABS:  CBC Full  -  ( 04 Sep 2020 09:17 )  WBC Count : 11.07 K/uL  RBC Count : 3.91 M/uL  Hemoglobin : 12.7 g/dL  Hematocrit : 38.0 %  Platelet Count - Automated : 207 K/uL  Mean Cell Volume : 97.2 fl  Mean Cell Hemoglobin : 32.5 pg  Mean Cell Hemoglobin Concentration : 33.4 gm/dL  Auto Neutrophil # : 8.37 K/uL  Auto Lymphocyte # : 1.23 K/uL  Auto Monocyte # : 1.33 K/uL  Auto Eosinophil # : 0.03 K/uL  Auto Basophil # : 0.04 K/uL  Auto Neutrophil % : 75.6 %  Auto Lymphocyte % : 11.1 %  Auto Monocyte % : 12.0 %  Auto Eosinophil % : 0.3 %  Auto Basophil % : 0.4 %    Urinalysis Basic - ( 03 Sep 2020 17:57 )    Color: Yellow / Appearance: Slightly Turbid / S.020 / pH: x  Gluc: x / Ketone: Negative  / Bili: Negative / Urobili: Negative   Blood: x / Protein: Trace / Nitrite: Positive   Leuk Esterase: Large / RBC: 6 /hpf / WBC 41 /HPF   Sq Epi: x / Non Sq Epi: 0 /hpf / Bacteria: Many      09-04    137  |  99  |  15  ----------------------------<  99  3.9   |  28  |  0.53    Ca    9.4      04 Sep 2020 09:17  Phos  2.8     09-04  Mg     1.6     09-04    TPro  7.3  /  Alb  3.4  /  TBili  0.3  /  DBili  x   /  AST  28  /  ALT  11  /  AlkPhos  81  09-03    Hemoglobin A1C:     LIVER FUNCTIONS - ( 03 Sep 2020 17:57 )  Alb: 3.4 g/dL / Pro: 7.3 g/dL / ALK PHOS: 81 U/L / ALT: 11 U/L / AST: 28 U/L / GGT: x           Vitamin B12 Vitamin B12, Serum: 1171 pg/mL ( @ 13:30)    PT/INR - ( 03 Sep 2020 17:57 )   PT: 13.2 sec;   INR: 1.12 ratio         PTT - ( 03 Sep 2020 17:57 )  PTT:29.6 sec      RADIOLOGY    EKG      Impression :     This is a   year old               schoenberg

## 2020-09-04 NOTE — CHART NOTE - NSCHARTNOTEFT_GEN_A_CORE
Informed by Rn that patient's  at bedside stating patient looks like she either had or is having a seizure.      INCOMPLETE Informed by RN that patient's  at bedside stating patient looks like she either had or is having a seizure.    88F w/ hx of dementia, pediatric meningitis c/b mild cognitive impairment, eclampsia/seizure disorder (last seizure Feb2020), eczema presents to [facility] for evaluation of generalized weakness. The patient reportedly has been having worsening gait instability and generalized weakness over last week. On day of presentation, she was assisted to use the bathroom and then had difficulty getting off of the toilet and  had to lift her off. Once on her feet she appeared to be stumbling and generally weak and was lowered to the floor as she was about to fall backward. No reported head strike but patient could not get up from ground and therefore ems called.    Upon visiting his wife today, Mr Efren noted that his wife did not look her usual self, was more disoriented than usual. He stated that how she look when she "is having a seizure".    P/E:  Awake, a little drowsy, oriented to self, follows commands, purposeful movements in all extremities.  LCTA B/L  S1S2    A/P:  R/o seizure  Neurology consult (Dr Schoenberg called; she is pts Neurologist outside hospital)  24H EEG ordered  AED will be adjusted by Neurologist    Above d/w Dr Isaac (Med Attdg)    NP Wilmer [id number]

## 2020-09-05 DIAGNOSIS — N12 TUBULO-INTERSTITIAL NEPHRITIS, NOT SPECIFIED AS ACUTE OR CHRONIC: ICD-10-CM

## 2020-09-05 DIAGNOSIS — G03.9 MENINGITIS, UNSPECIFIED: ICD-10-CM

## 2020-09-05 LAB
HCT VFR BLD CALC: 36.6 % — SIGNIFICANT CHANGE UP (ref 34.5–45)
HGB BLD-MCNC: 12 G/DL — SIGNIFICANT CHANGE UP (ref 11.5–15.5)
MCHC RBC-ENTMCNC: 32.3 PG — SIGNIFICANT CHANGE UP (ref 27–34)
MCHC RBC-ENTMCNC: 32.8 GM/DL — SIGNIFICANT CHANGE UP (ref 32–36)
MCV RBC AUTO: 98.7 FL — SIGNIFICANT CHANGE UP (ref 80–100)
NRBC # BLD: 0 /100 WBCS — SIGNIFICANT CHANGE UP (ref 0–0)
PLATELET # BLD AUTO: 221 K/UL — SIGNIFICANT CHANGE UP (ref 150–400)
RBC # BLD: 3.71 M/UL — LOW (ref 3.8–5.2)
RBC # FLD: 13.1 % — SIGNIFICANT CHANGE UP (ref 10.3–14.5)
WBC # BLD: 12.77 K/UL — HIGH (ref 3.8–10.5)
WBC # FLD AUTO: 12.77 K/UL — HIGH (ref 3.8–10.5)

## 2020-09-05 PROCEDURE — 99223 1ST HOSP IP/OBS HIGH 75: CPT

## 2020-09-05 RX ORDER — RUFINAMIDE 40 MG/ML
400 SUSPENSION ORAL
Refills: 0 | Status: DISCONTINUED | OUTPATIENT
Start: 2020-09-05 | End: 2020-09-18

## 2020-09-05 RX ADMIN — RUFINAMIDE 400 MILLIGRAM(S): 40 SUSPENSION ORAL at 21:13

## 2020-09-05 RX ADMIN — RUFINAMIDE 400 MILLIGRAM(S): 40 SUSPENSION ORAL at 14:34

## 2020-09-05 RX ADMIN — ENOXAPARIN SODIUM 40 MILLIGRAM(S): 100 INJECTION SUBCUTANEOUS at 11:18

## 2020-09-05 RX ADMIN — RUFINAMIDE 800 MILLIGRAM(S): 40 SUSPENSION ORAL at 06:11

## 2020-09-05 RX ADMIN — CEFTRIAXONE 100 MILLIGRAM(S): 500 INJECTION, POWDER, FOR SOLUTION INTRAMUSCULAR; INTRAVENOUS at 19:23

## 2020-09-05 NOTE — CONSULT NOTE ADULT - ASSESSMENT
Weakness  Dementia  Seizures  Falls  No acute cardiac problem  No need for us to follow daily  Please call for any cardiac problem

## 2020-09-05 NOTE — CONSULT NOTE ADULT - REASON FOR ADMISSION
88F presenting with generalized weakness

## 2020-09-05 NOTE — CONSULT NOTE ADULT - SUBJECTIVE AND OBJECTIVE BOX
Patient seen and evaluated @ 8:35 AM on [address] [facility]  Chief Complaint: Weakness    HPI:  Patient has dementia and is unable to provide history. Therefore HPI collected from her  Mr. Johnny Schwartz via telephone    88F w/ hx of dementia, pediatric meningitis c/b mild cognitive impairment, eclampsia/seizure disorder (last seizure Feb2020), eczema presents to [facility] for evaluation of generalized weakness. The patient reportedly has been having worsening gait instability and generalized weakness over last week. She was evaluated for typical check up with PCP 6d prior on 8/28/20 and on 9/1 they were informed she had UTI and was prescribed ciprofloxacin which was picked up but not started. She reportedly has felt warm but had no complaints to . On day of presentation, she was assisted to use the bathroom and then had difficulty getting off of the toilet and  had to lift her off. Once on her feet she appeared to be stumbling and generally weak and was lowered to the floor as she was about to fall backward. No reported head strike but patient could not get up from ground and therefore ems called. Of note patient has been working with PT for months for gait instability and last week she was found on kitchen floor by  but she could not remember what occurred. Last seizure Feb2020, and more recently has switched from divalproex back to Banzel 3d ago.  attributes divalproex and new memantine for her depression and therefore has stopped medications (memantine is still Rx by neurologist). Patient reports penicillin allergy as a child and  reports no known rash or anaphylaxis reaction.    In the ED, .5, 152/75, 120, 18 99%RA  s/p ceftriaxone 1gx1, 1L LR, acetaminophen 650 (03 Sep 2020 22:50)    PMH:   Dementia  Memory loss due to medical condition  Hypercholesterolemia  IBS (irritable bowel syndrome)  Seizure disorder  Meningitis    PSH:   Cataract extraction status, right    Medications:   cefTRIAXone   IVPB 1000 milliGRAM(s) IV Intermittent every 24 hours  enoxaparin Injectable 40 milliGRAM(s) SubCutaneous daily  rufinamide 800 milliGRAM(s) Oral two times a day    Allergies:   reports severe allergy to PEAS and NUTS (Hives)  Nuts (Hives)  penicillin (Unknown)    FAMILY HISTORY:  No pertinent family history in first degree relatives    Social History:  Smoking:  Alcohol:  Drugs:    Review of Systems:  [ ]Unable to obtain  Constitutional: No fever, chills, fatigue, or changes in weight  HEENT: No blurry vision, eye pain, headache, runny nose, or sore throat  Respiratory: No shortness of breath, cough, or wheezing  Cardiovascular: No chest pain or palpitations  Gastrointestinal: No nausea, vomiting, diarrhea, or abdominal pain  Genitourinary: No dysuria or incontinence  Extremities: No lower extremity swelling  Neurologic: No focal findings  Lymphatic: No lymphadenopathy   Skin: No rash  Psychiatry: No anxiety or depression  10 point review of systems is otherwise negative except as mentioned above            Physical Exam:  T(C): 37.1 (09-05-20 @ 04:51), Max: 37.1 (09-04-20 @ 21:15)  HR: 103 (09-05-20 @ 04:51) (59 - 103)  BP: 103/54 (09-05-20 @ 04:51) (103/54 - 144/68)  RR: 18 (09-05-20 @ 04:51) (16 - 18)  SpO2: 98% (09-05-20 @ 04:51) (93% - 100%)  Wt(kg): --    09-04 @ 07:01  -  09-05 @ 07:00  --------------------------------------------------------  IN: 870 mL / OUT: 1300 mL / NET: -430 mL      Daily     Daily     Appearance: Normal, well groomed, NAD  Eyes: PERRLA, EOMI, pink conjunctiva, no scleral icterus   HENT: Normal oral mucosa  Cardiovascular: RRR, S1, S2, no murmur, rub, or gallop; no edema; no JVD  Respiratory: Clear to auscultation bilaterally  Gastrointestinal: Soft, non-tender, non-distended, BS+  Musculoskeletal: No clubbing or joint deformity   Neurologic: No focal weakness  Lymphatic: No lymphadenopathy  Psychiatry: AAOx3 with appropriate mood and affect  Skin: No rashes, ecchymoses, or cyanosis    Cardiovascular Diagnostic Testing:  ECG:    Echo:    Stress Testing:    Cath:    Interpretation of Telemetry:    Imaging:    Labs:                        12.0   12.77 )-----------( 221      ( 05 Sep 2020 07:31 )             36.6     09-04    137  |  99  |  15  ----------------------------<  99  3.9   |  28  |  0.53    Ca    9.4      04 Sep 2020 09:17  Phos  2.8     09-04  Mg     1.6     09-04    TPro  7.3  /  Alb  3.4  /  TBili  0.3  /  DBili  x   /  AST  28  /  ALT  11  /  AlkPhos  81  09-03    PT/INR - ( 03 Sep 2020 17:57 )   PT: 13.2 sec;   INR: 1.12 ratio         PTT - ( 03 Sep 2020 17:57 )  PTT:29.6 sec  CARDIAC MARKERS ( 03 Sep 2020 17:57 )  x     / x     / 44 U/L / x     / x

## 2020-09-06 LAB
-  AMIKACIN: SIGNIFICANT CHANGE UP
-  AMOXICILLIN/CLAVULANIC ACID: SIGNIFICANT CHANGE UP
-  AMPICILLIN/SULBACTAM: SIGNIFICANT CHANGE UP
-  AMPICILLIN: SIGNIFICANT CHANGE UP
-  AZTREONAM: SIGNIFICANT CHANGE UP
-  CEFAZOLIN: SIGNIFICANT CHANGE UP
-  CEFEPIME: SIGNIFICANT CHANGE UP
-  CEFOXITIN: SIGNIFICANT CHANGE UP
-  CEFTRIAXONE: SIGNIFICANT CHANGE UP
-  CIPROFLOXACIN: SIGNIFICANT CHANGE UP
-  ERTAPENEM: SIGNIFICANT CHANGE UP
-  GENTAMICIN: SIGNIFICANT CHANGE UP
-  IMIPENEM: SIGNIFICANT CHANGE UP
-  LEVOFLOXACIN: SIGNIFICANT CHANGE UP
-  MEROPENEM: SIGNIFICANT CHANGE UP
-  NITROFURANTOIN: SIGNIFICANT CHANGE UP
-  PIPERACILLIN/TAZOBACTAM: SIGNIFICANT CHANGE UP
-  TIGECYCLINE: SIGNIFICANT CHANGE UP
-  TOBRAMYCIN: SIGNIFICANT CHANGE UP
-  TRIMETHOPRIM/SULFAMETHOXAZOLE: SIGNIFICANT CHANGE UP
ANION GAP SERPL CALC-SCNC: 11 MMOL/L — SIGNIFICANT CHANGE UP (ref 5–17)
BUN SERPL-MCNC: 19 MG/DL — SIGNIFICANT CHANGE UP (ref 7–23)
CALCIUM SERPL-MCNC: 10.1 MG/DL — SIGNIFICANT CHANGE UP (ref 8.4–10.5)
CHLORIDE SERPL-SCNC: 96 MMOL/L — SIGNIFICANT CHANGE UP (ref 96–108)
CO2 SERPL-SCNC: 28 MMOL/L — SIGNIFICANT CHANGE UP (ref 22–31)
CREAT SERPL-MCNC: 0.63 MG/DL — SIGNIFICANT CHANGE UP (ref 0.5–1.3)
CULTURE RESULTS: SIGNIFICANT CHANGE UP
GLUCOSE SERPL-MCNC: 128 MG/DL — HIGH (ref 70–99)
HCT VFR BLD CALC: 37 % — SIGNIFICANT CHANGE UP (ref 34.5–45)
HGB BLD-MCNC: 12.2 G/DL — SIGNIFICANT CHANGE UP (ref 11.5–15.5)
MAGNESIUM SERPL-MCNC: 2 MG/DL — SIGNIFICANT CHANGE UP (ref 1.6–2.6)
MCHC RBC-ENTMCNC: 33 GM/DL — SIGNIFICANT CHANGE UP (ref 32–36)
MCHC RBC-ENTMCNC: 33 PG — SIGNIFICANT CHANGE UP (ref 27–34)
MCV RBC AUTO: 100 FL — SIGNIFICANT CHANGE UP (ref 80–100)
METHOD TYPE: SIGNIFICANT CHANGE UP
NRBC # BLD: 0 /100 WBCS — SIGNIFICANT CHANGE UP (ref 0–0)
ORGANISM # SPEC MICROSCOPIC CNT: SIGNIFICANT CHANGE UP
ORGANISM # SPEC MICROSCOPIC CNT: SIGNIFICANT CHANGE UP
PLATELET # BLD AUTO: 237 K/UL — SIGNIFICANT CHANGE UP (ref 150–400)
POTASSIUM SERPL-MCNC: 3.9 MMOL/L — SIGNIFICANT CHANGE UP (ref 3.5–5.3)
POTASSIUM SERPL-SCNC: 3.9 MMOL/L — SIGNIFICANT CHANGE UP (ref 3.5–5.3)
RBC # BLD: 3.7 M/UL — LOW (ref 3.8–5.2)
RBC # FLD: 13.3 % — SIGNIFICANT CHANGE UP (ref 10.3–14.5)
SODIUM SERPL-SCNC: 135 MMOL/L — SIGNIFICANT CHANGE UP (ref 135–145)
SPECIMEN SOURCE: SIGNIFICANT CHANGE UP
WBC # BLD: 13.51 K/UL — HIGH (ref 3.8–10.5)
WBC # FLD AUTO: 13.51 K/UL — HIGH (ref 3.8–10.5)

## 2020-09-06 RX ORDER — SODIUM CHLORIDE 9 MG/ML
1000 INJECTION INTRAMUSCULAR; INTRAVENOUS; SUBCUTANEOUS
Refills: 0 | Status: DISCONTINUED | OUTPATIENT
Start: 2020-09-06 | End: 2020-09-07

## 2020-09-06 RX ADMIN — CEFTRIAXONE 100 MILLIGRAM(S): 500 INJECTION, POWDER, FOR SOLUTION INTRAMUSCULAR; INTRAVENOUS at 19:01

## 2020-09-06 RX ADMIN — SODIUM CHLORIDE 50 MILLILITER(S): 9 INJECTION INTRAMUSCULAR; INTRAVENOUS; SUBCUTANEOUS at 18:35

## 2020-09-06 RX ADMIN — RUFINAMIDE 400 MILLIGRAM(S): 40 SUSPENSION ORAL at 13:35

## 2020-09-06 RX ADMIN — RUFINAMIDE 400 MILLIGRAM(S): 40 SUSPENSION ORAL at 04:51

## 2020-09-06 RX ADMIN — ENOXAPARIN SODIUM 40 MILLIGRAM(S): 100 INJECTION SUBCUTANEOUS at 13:35

## 2020-09-06 RX ADMIN — RUFINAMIDE 400 MILLIGRAM(S): 40 SUSPENSION ORAL at 22:04

## 2020-09-06 NOTE — PHYSICAL THERAPY INITIAL EVALUATION ADULT - PRECAUTIONS/LIMITATIONS, REHAB EVAL
CONTINUED: Pts  also reports recently finding wife down on the ground in the kitchen without recollection of event. CXR and pelvis xray clear. CT Head (-). CONTINUED: Pts  also reports recently finding wife down on the ground in the kitchen without recollection of event. CXR and pelvis xray clear. CT Head (-)./fall precautions/seizure precautions

## 2020-09-06 NOTE — PHYSICAL THERAPY INITIAL EVALUATION ADULT - GENERAL OBSERVATIONS, REHAB EVAL
Rec'd supine in bed, in NAD, +LOGNA lock, +primafit. Agreeable to PT. Pt pleasantly confused and was only able to remember her name and birth date. Was unaware of the month/year and thought she was in her bedroom at home. Pt assumes that she is in the hospital due to a fall.

## 2020-09-06 NOTE — PHYSICAL THERAPY INITIAL EVALUATION ADULT - IMPAIRMENTS CONTRIBUTING TO GAIT DEVIATIONS, PT EVAL
decreased flexibility/impaired coordination/narrow base of support/impaired postural control/decreased strength/impaired balance

## 2020-09-06 NOTE — PHYSICAL THERAPY INITIAL EVALUATION ADULT - ADDITIONAL COMMENTS
Per pt and her , they live in [facility] with no steps to navigate (elevator inside). Pt has RW and cane at home. Uses RW outdoors and cane indoors.  reports pt has an aide 2-3x/week for 5 hours. Reports pt was able to ambulate around apt and complete ADLs with supervision. Son reports pt has progressively been getting worse over the past few weeks/months.  reports pt was receiving HomePT and was about to start Home OT.

## 2020-09-06 NOTE — PHYSICAL THERAPY INITIAL EVALUATION ADULT - IMPAIRED TRANSFERS: SIT/STAND, REHAB EVAL
impaired balance/impaired coordination/decreased flexibility/decreased strength/narrow base of support/impaired postural control

## 2020-09-06 NOTE — PHYSICAL THERAPY INITIAL EVALUATION ADULT - ACTIVE RANGE OF MOTION EXAMINATION, REHAB EVAL
Left UE Active ROM was WFL (within functional limits)/bilateral  lower extremity Active ROM was WFL (within functional limits)/RUE AROM WFL except shld flx (~85 degrees); Bilateral ankle resting position ~45 degrees PF (can passively bring to -10 to -5 DF)

## 2020-09-06 NOTE — PHYSICAL THERAPY INITIAL EVALUATION ADULT - BALANCE DISTURBANCE, IDENTIFIED IMPAIRMENT CONTRIBUTE, REHAB EVAL
decreased ROM/impaired coordination/impaired postural control/decreased strength/impaired motor control

## 2020-09-06 NOTE — PHYSICAL THERAPY INITIAL EVALUATION ADULT - PERTINENT HX OF CURRENT PROBLEM, REHAB EVAL
Pt is a 87 y/o female with generalized weakness. PMH: dementia, pediatric meningitis c/b mild congestive impairment, eclampsia/seizure disorder (last seizure Feb 2020), eczema. Hx obtained from  due to pts dementia limiting ability to provide hx. Pt presents to [facility] ED with gait instability and generalized weakness x1 week. Pt had recent visit to PCP where she was dx with UTI, however had not started antibiotics prior to admission.

## 2020-09-07 LAB
ANION GAP SERPL CALC-SCNC: 14 MMOL/L — SIGNIFICANT CHANGE UP (ref 5–17)
BUN SERPL-MCNC: 11 MG/DL — SIGNIFICANT CHANGE UP (ref 7–23)
CALCIUM SERPL-MCNC: 8.8 MG/DL — SIGNIFICANT CHANGE UP (ref 8.4–10.5)
CHLORIDE SERPL-SCNC: 101 MMOL/L — SIGNIFICANT CHANGE UP (ref 96–108)
CO2 SERPL-SCNC: 27 MMOL/L — SIGNIFICANT CHANGE UP (ref 22–31)
CREAT SERPL-MCNC: 0.6 MG/DL — SIGNIFICANT CHANGE UP (ref 0.5–1.3)
GLUCOSE SERPL-MCNC: 151 MG/DL — HIGH (ref 70–99)
HCT VFR BLD CALC: 37.3 % — SIGNIFICANT CHANGE UP (ref 34.5–45)
HGB BLD-MCNC: 11.8 G/DL — SIGNIFICANT CHANGE UP (ref 11.5–15.5)
MAGNESIUM SERPL-MCNC: 1.7 MG/DL — SIGNIFICANT CHANGE UP (ref 1.6–2.6)
MCHC RBC-ENTMCNC: 27.6 PG — SIGNIFICANT CHANGE UP (ref 27–34)
MCHC RBC-ENTMCNC: 31.6 GM/DL — LOW (ref 32–36)
MCV RBC AUTO: 87.4 FL — SIGNIFICANT CHANGE UP (ref 80–100)
NRBC # BLD: 0 /100 WBCS — SIGNIFICANT CHANGE UP (ref 0–0)
PLATELET # BLD AUTO: 186 K/UL — SIGNIFICANT CHANGE UP (ref 150–400)
POTASSIUM SERPL-MCNC: 4.1 MMOL/L — SIGNIFICANT CHANGE UP (ref 3.5–5.3)
POTASSIUM SERPL-SCNC: 4.1 MMOL/L — SIGNIFICANT CHANGE UP (ref 3.5–5.3)
RBC # BLD: 4.27 M/UL — SIGNIFICANT CHANGE UP (ref 3.8–5.2)
RBC # FLD: 14.2 % — SIGNIFICANT CHANGE UP (ref 10.3–14.5)
SODIUM SERPL-SCNC: 142 MMOL/L — SIGNIFICANT CHANGE UP (ref 135–145)
WBC # BLD: 7.27 K/UL — SIGNIFICANT CHANGE UP (ref 3.8–10.5)
WBC # FLD AUTO: 7.27 K/UL — SIGNIFICANT CHANGE UP (ref 3.8–10.5)

## 2020-09-07 RX ORDER — METOPROLOL TARTRATE 50 MG
12.5 TABLET ORAL
Refills: 0 | Status: DISCONTINUED | OUTPATIENT
Start: 2020-09-07 | End: 2020-09-08

## 2020-09-07 RX ORDER — SODIUM CHLORIDE 9 MG/ML
1000 INJECTION INTRAMUSCULAR; INTRAVENOUS; SUBCUTANEOUS
Refills: 0 | Status: DISCONTINUED | OUTPATIENT
Start: 2020-09-07 | End: 2020-09-13

## 2020-09-07 RX ADMIN — Medication 12.5 MILLIGRAM(S): at 21:37

## 2020-09-07 RX ADMIN — RUFINAMIDE 400 MILLIGRAM(S): 40 SUSPENSION ORAL at 06:08

## 2020-09-07 RX ADMIN — ENOXAPARIN SODIUM 40 MILLIGRAM(S): 100 INJECTION SUBCUTANEOUS at 13:00

## 2020-09-07 RX ADMIN — RUFINAMIDE 400 MILLIGRAM(S): 40 SUSPENSION ORAL at 21:47

## 2020-09-07 RX ADMIN — CEFTRIAXONE 100 MILLIGRAM(S): 500 INJECTION, POWDER, FOR SOLUTION INTRAMUSCULAR; INTRAVENOUS at 19:07

## 2020-09-07 RX ADMIN — RUFINAMIDE 400 MILLIGRAM(S): 40 SUSPENSION ORAL at 13:00

## 2020-09-08 LAB
ANION GAP SERPL CALC-SCNC: 9 MMOL/L — SIGNIFICANT CHANGE UP (ref 5–17)
BUN SERPL-MCNC: 22 MG/DL — SIGNIFICANT CHANGE UP (ref 7–23)
CALCIUM SERPL-MCNC: 9.8 MG/DL — SIGNIFICANT CHANGE UP (ref 8.4–10.5)
CHLORIDE SERPL-SCNC: 99 MMOL/L — SIGNIFICANT CHANGE UP (ref 96–108)
CO2 SERPL-SCNC: 29 MMOL/L — SIGNIFICANT CHANGE UP (ref 22–31)
CREAT SERPL-MCNC: 0.49 MG/DL — LOW (ref 0.5–1.3)
GLUCOSE SERPL-MCNC: 120 MG/DL — HIGH (ref 70–99)
HCT VFR BLD CALC: 35.7 % — SIGNIFICANT CHANGE UP (ref 34.5–45)
HGB BLD-MCNC: 12 G/DL — SIGNIFICANT CHANGE UP (ref 11.5–15.5)
MCHC RBC-ENTMCNC: 33.1 PG — SIGNIFICANT CHANGE UP (ref 27–34)
MCHC RBC-ENTMCNC: 33.6 GM/DL — SIGNIFICANT CHANGE UP (ref 32–36)
MCV RBC AUTO: 98.3 FL — SIGNIFICANT CHANGE UP (ref 80–100)
NRBC # BLD: 0 /100 WBCS — SIGNIFICANT CHANGE UP (ref 0–0)
PLATELET # BLD AUTO: 263 K/UL — SIGNIFICANT CHANGE UP (ref 150–400)
POTASSIUM SERPL-MCNC: 3.8 MMOL/L — SIGNIFICANT CHANGE UP (ref 3.5–5.3)
POTASSIUM SERPL-SCNC: 3.8 MMOL/L — SIGNIFICANT CHANGE UP (ref 3.5–5.3)
RBC # BLD: 3.63 M/UL — LOW (ref 3.8–5.2)
RBC # FLD: 13 % — SIGNIFICANT CHANGE UP (ref 10.3–14.5)
RUFINAMIDE LEVEL: 18.2 UG/ML — SIGNIFICANT CHANGE UP
SODIUM SERPL-SCNC: 137 MMOL/L — SIGNIFICANT CHANGE UP (ref 135–145)
WBC # BLD: 11.05 K/UL — HIGH (ref 3.8–10.5)
WBC # FLD AUTO: 11.05 K/UL — HIGH (ref 3.8–10.5)

## 2020-09-08 PROCEDURE — 93970 EXTREMITY STUDY: CPT | Mod: 26

## 2020-09-08 RX ORDER — METOPROLOL TARTRATE 50 MG
25 TABLET ORAL
Refills: 0 | Status: DISCONTINUED | OUTPATIENT
Start: 2020-09-08 | End: 2020-09-10

## 2020-09-08 RX ADMIN — CEFTRIAXONE 100 MILLIGRAM(S): 500 INJECTION, POWDER, FOR SOLUTION INTRAMUSCULAR; INTRAVENOUS at 21:25

## 2020-09-08 RX ADMIN — Medication 12.5 MILLIGRAM(S): at 05:45

## 2020-09-08 RX ADMIN — ENOXAPARIN SODIUM 40 MILLIGRAM(S): 100 INJECTION SUBCUTANEOUS at 11:44

## 2020-09-08 RX ADMIN — RUFINAMIDE 400 MILLIGRAM(S): 40 SUSPENSION ORAL at 21:25

## 2020-09-08 RX ADMIN — RUFINAMIDE 400 MILLIGRAM(S): 40 SUSPENSION ORAL at 05:45

## 2020-09-08 RX ADMIN — RUFINAMIDE 400 MILLIGRAM(S): 40 SUSPENSION ORAL at 13:10

## 2020-09-08 RX ADMIN — Medication 25 MILLIGRAM(S): at 18:26

## 2020-09-09 LAB
ANION GAP SERPL CALC-SCNC: 11 MMOL/L — SIGNIFICANT CHANGE UP (ref 5–17)
BUN SERPL-MCNC: 21 MG/DL — SIGNIFICANT CHANGE UP (ref 7–23)
CALCIUM SERPL-MCNC: 10.1 MG/DL — SIGNIFICANT CHANGE UP (ref 8.4–10.5)
CHLORIDE SERPL-SCNC: 99 MMOL/L — SIGNIFICANT CHANGE UP (ref 96–108)
CO2 SERPL-SCNC: 29 MMOL/L — SIGNIFICANT CHANGE UP (ref 22–31)
CREAT SERPL-MCNC: 0.48 MG/DL — LOW (ref 0.5–1.3)
CULTURE RESULTS: SIGNIFICANT CHANGE UP
CULTURE RESULTS: SIGNIFICANT CHANGE UP
GLUCOSE SERPL-MCNC: 109 MG/DL — HIGH (ref 70–99)
HCT VFR BLD CALC: 35.4 % — SIGNIFICANT CHANGE UP (ref 34.5–45)
HGB BLD-MCNC: 11.7 G/DL — SIGNIFICANT CHANGE UP (ref 11.5–15.5)
MCHC RBC-ENTMCNC: 33.1 GM/DL — SIGNIFICANT CHANGE UP (ref 32–36)
MCHC RBC-ENTMCNC: 33.2 PG — SIGNIFICANT CHANGE UP (ref 27–34)
MCV RBC AUTO: 100.6 FL — HIGH (ref 80–100)
NRBC # BLD: 0 /100 WBCS — SIGNIFICANT CHANGE UP (ref 0–0)
PLATELET # BLD AUTO: 271 K/UL — SIGNIFICANT CHANGE UP (ref 150–400)
POTASSIUM SERPL-MCNC: 3.6 MMOL/L — SIGNIFICANT CHANGE UP (ref 3.5–5.3)
POTASSIUM SERPL-SCNC: 3.6 MMOL/L — SIGNIFICANT CHANGE UP (ref 3.5–5.3)
RBC # BLD: 3.52 M/UL — LOW (ref 3.8–5.2)
RBC # FLD: 13.2 % — SIGNIFICANT CHANGE UP (ref 10.3–14.5)
SODIUM SERPL-SCNC: 139 MMOL/L — SIGNIFICANT CHANGE UP (ref 135–145)
SPECIMEN SOURCE: SIGNIFICANT CHANGE UP
SPECIMEN SOURCE: SIGNIFICANT CHANGE UP
WBC # BLD: 9.31 K/UL — SIGNIFICANT CHANGE UP (ref 3.8–10.5)
WBC # FLD AUTO: 9.31 K/UL — SIGNIFICANT CHANGE UP (ref 3.8–10.5)

## 2020-09-09 RX ADMIN — CEFTRIAXONE 100 MILLIGRAM(S): 500 INJECTION, POWDER, FOR SOLUTION INTRAMUSCULAR; INTRAVENOUS at 21:15

## 2020-09-09 RX ADMIN — RUFINAMIDE 400 MILLIGRAM(S): 40 SUSPENSION ORAL at 13:25

## 2020-09-09 RX ADMIN — ENOXAPARIN SODIUM 40 MILLIGRAM(S): 100 INJECTION SUBCUTANEOUS at 13:25

## 2020-09-09 RX ADMIN — Medication 25 MILLIGRAM(S): at 17:46

## 2020-09-09 RX ADMIN — RUFINAMIDE 400 MILLIGRAM(S): 40 SUSPENSION ORAL at 21:15

## 2020-09-09 RX ADMIN — RUFINAMIDE 400 MILLIGRAM(S): 40 SUSPENSION ORAL at 05:34

## 2020-09-09 RX ADMIN — Medication 25 MILLIGRAM(S): at 05:34

## 2020-09-09 NOTE — DIETITIAN INITIAL EVALUATION ADULT. - PHYSICAL APPEARANCE
underweight/other (specify) Ht: 64 inches-per dosing, 61inches per [facility] HIE, Wt: 100lbs, BMI: 17.2kg/m2--per dosing ht, 18.9kg--per ht from [facility], IBW: 120lbs +/- 10%, %IBW: 120%  Edema: none, Skin: free of pressure injuries per nursing flow sheets    Nutrition focused physical exam deferred at this time, pt eating breakfast, noted with confusion at times.

## 2020-09-09 NOTE — DIETITIAN INITIAL EVALUATION ADULT. - OTHER INFO
Pertinent Information: This is a 88 F w/ hx of dementia, pediatric meningitis c/b mild cognitive impairment, eclampsia/seizure disorder (last seizure Feb2020), eczema presents to [facility] for evaluation of generalized weakness found to have sepsis 2/2 UTI.      Pt seen in bed, eating breakfast during visit, no family at bedside. Pt noted with dementia, confused at times, ? accuracy. Pt reports good PO intake and appetite at home, unable to provide diet recall, lives with . Confirms allergy to nuts and green peas (hives), otherwise she states she eats "everything". No Kosher diet. No acute GI distress noted, denies chewing/swallowing difficulty (pt eating apple during visit), nausea, vomiting, diarrhea, constipation PTA. No micronutrient supplementation noted.     Weights: Weights per [facility] HIE as follows: 87lbs (12/2014), 90lbs (1/30/18), 92lbs (6/20/18), 98lbs (8/1/18), 91lbs (12/26/18), 86lbs (2/7/20, 95lbs (9/3/20), dosing weight: 100lbs (9/3). Overall weight appears relatively stable over the last 6 years with fluctuations within ~ 10lbs. Pt appears with weight gain over the last 6 months.     Pt eating breakfast during visit, reports good appetite. Confirmed with RN, pt eating well, ordered for Ensure to supplement PO intake as needed. Pt encouraged to consume supplements during meals. Discussed with patient importance of PO intake and prioritizing sources of protein as able. No diet related questions/concerns at this time. RD to remain available and follow-up as medically appropriate.

## 2020-09-09 NOTE — CHART NOTE - TREATMENT: THE FOLLOWING DIET HAS BEEN RECOMMENDED
Diet, Regular:   Nut Restricted  Kosher  Supplement Feeding Modality:  Oral  Ensure Enlive Cans or Servings Per Day:  2       Frequency:  Daily (09-07-20 @ 13:18) [active]

## 2020-09-09 NOTE — DIETITIAN INITIAL EVALUATION ADULT. - ADD RECOMMEND
1) Continue current diet as tolerated. 2) Continue Ensure Enlive BID to supplement PO intake as needed. 3) Encourage PO intake of protein-rich foods. 4) RD to remain available and follow-up as medically appropriate. 5) BMI< 19k/m2 alert placed in EMR, provider notified.

## 2020-09-10 LAB
ANION GAP SERPL CALC-SCNC: 7 MMOL/L — SIGNIFICANT CHANGE UP (ref 5–17)
BUN SERPL-MCNC: 25 MG/DL — HIGH (ref 7–23)
CALCIUM SERPL-MCNC: 9.8 MG/DL — SIGNIFICANT CHANGE UP (ref 8.4–10.5)
CHLORIDE SERPL-SCNC: 100 MMOL/L — SIGNIFICANT CHANGE UP (ref 96–108)
CO2 SERPL-SCNC: 30 MMOL/L — SIGNIFICANT CHANGE UP (ref 22–31)
CREAT SERPL-MCNC: 0.49 MG/DL — LOW (ref 0.5–1.3)
GLUCOSE SERPL-MCNC: 106 MG/DL — HIGH (ref 70–99)
HCT VFR BLD CALC: 32.3 % — LOW (ref 34.5–45)
HGB BLD-MCNC: 10.5 G/DL — LOW (ref 11.5–15.5)
MCHC RBC-ENTMCNC: 32.4 PG — SIGNIFICANT CHANGE UP (ref 27–34)
MCHC RBC-ENTMCNC: 32.5 GM/DL — SIGNIFICANT CHANGE UP (ref 32–36)
MCV RBC AUTO: 99.7 FL — SIGNIFICANT CHANGE UP (ref 80–100)
NRBC # BLD: 0 /100 WBCS — SIGNIFICANT CHANGE UP (ref 0–0)
PLATELET # BLD AUTO: 299 K/UL — SIGNIFICANT CHANGE UP (ref 150–400)
POTASSIUM SERPL-MCNC: 4.3 MMOL/L — SIGNIFICANT CHANGE UP (ref 3.5–5.3)
POTASSIUM SERPL-SCNC: 4.3 MMOL/L — SIGNIFICANT CHANGE UP (ref 3.5–5.3)
RBC # BLD: 3.24 M/UL — LOW (ref 3.8–5.2)
RBC # FLD: 13.2 % — SIGNIFICANT CHANGE UP (ref 10.3–14.5)
SODIUM SERPL-SCNC: 137 MMOL/L — SIGNIFICANT CHANGE UP (ref 135–145)
WBC # BLD: 9.58 K/UL — SIGNIFICANT CHANGE UP (ref 3.8–10.5)
WBC # FLD AUTO: 9.58 K/UL — SIGNIFICANT CHANGE UP (ref 3.8–10.5)

## 2020-09-10 RX ADMIN — Medication 25 MILLIGRAM(S): at 05:55

## 2020-09-10 RX ADMIN — CEFTRIAXONE 100 MILLIGRAM(S): 500 INJECTION, POWDER, FOR SOLUTION INTRAMUSCULAR; INTRAVENOUS at 20:55

## 2020-09-10 RX ADMIN — RUFINAMIDE 400 MILLIGRAM(S): 40 SUSPENSION ORAL at 13:01

## 2020-09-10 RX ADMIN — RUFINAMIDE 400 MILLIGRAM(S): 40 SUSPENSION ORAL at 05:55

## 2020-09-10 RX ADMIN — RUFINAMIDE 400 MILLIGRAM(S): 40 SUSPENSION ORAL at 22:27

## 2020-09-10 RX ADMIN — ENOXAPARIN SODIUM 40 MILLIGRAM(S): 100 INJECTION SUBCUTANEOUS at 13:01

## 2020-09-11 LAB
ANION GAP SERPL CALC-SCNC: 9 MMOL/L — SIGNIFICANT CHANGE UP (ref 5–17)
BUN SERPL-MCNC: 23 MG/DL — SIGNIFICANT CHANGE UP (ref 7–23)
CALCIUM SERPL-MCNC: 9.9 MG/DL — SIGNIFICANT CHANGE UP (ref 8.4–10.5)
CHLORIDE SERPL-SCNC: 100 MMOL/L — SIGNIFICANT CHANGE UP (ref 96–108)
CO2 SERPL-SCNC: 29 MMOL/L — SIGNIFICANT CHANGE UP (ref 22–31)
CREAT SERPL-MCNC: 0.53 MG/DL — SIGNIFICANT CHANGE UP (ref 0.5–1.3)
GLUCOSE SERPL-MCNC: 111 MG/DL — HIGH (ref 70–99)
HCT VFR BLD CALC: 34.5 % — SIGNIFICANT CHANGE UP (ref 34.5–45)
HGB BLD-MCNC: 11.2 G/DL — LOW (ref 11.5–15.5)
MCHC RBC-ENTMCNC: 32.2 PG — SIGNIFICANT CHANGE UP (ref 27–34)
MCHC RBC-ENTMCNC: 32.5 GM/DL — SIGNIFICANT CHANGE UP (ref 32–36)
MCV RBC AUTO: 99.1 FL — SIGNIFICANT CHANGE UP (ref 80–100)
NRBC # BLD: 0 /100 WBCS — SIGNIFICANT CHANGE UP (ref 0–0)
PLATELET # BLD AUTO: 311 K/UL — SIGNIFICANT CHANGE UP (ref 150–400)
POTASSIUM SERPL-MCNC: 4.1 MMOL/L — SIGNIFICANT CHANGE UP (ref 3.5–5.3)
POTASSIUM SERPL-SCNC: 4.1 MMOL/L — SIGNIFICANT CHANGE UP (ref 3.5–5.3)
RBC # BLD: 3.48 M/UL — LOW (ref 3.8–5.2)
RBC # FLD: 13.2 % — SIGNIFICANT CHANGE UP (ref 10.3–14.5)
SARS-COV-2 RNA SPEC QL NAA+PROBE: SIGNIFICANT CHANGE UP
SODIUM SERPL-SCNC: 138 MMOL/L — SIGNIFICANT CHANGE UP (ref 135–145)
TSH SERPL-MCNC: 3.33 UIU/ML — SIGNIFICANT CHANGE UP (ref 0.27–4.2)
WBC # BLD: 8.53 K/UL — SIGNIFICANT CHANGE UP (ref 3.8–10.5)
WBC # FLD AUTO: 8.53 K/UL — SIGNIFICANT CHANGE UP (ref 3.8–10.5)

## 2020-09-11 RX ORDER — CEFTRIAXONE 500 MG/1
1000 INJECTION, POWDER, FOR SOLUTION INTRAMUSCULAR; INTRAVENOUS EVERY 24 HOURS
Refills: 0 | Status: ACTIVE | OUTPATIENT
Start: 2020-09-11 | End: 2020-09-18

## 2020-09-11 RX ADMIN — RUFINAMIDE 400 MILLIGRAM(S): 40 SUSPENSION ORAL at 05:08

## 2020-09-11 RX ADMIN — ENOXAPARIN SODIUM 40 MILLIGRAM(S): 100 INJECTION SUBCUTANEOUS at 12:31

## 2020-09-11 RX ADMIN — RUFINAMIDE 400 MILLIGRAM(S): 40 SUSPENSION ORAL at 12:32

## 2020-09-11 RX ADMIN — RUFINAMIDE 400 MILLIGRAM(S): 40 SUSPENSION ORAL at 20:50

## 2020-09-11 RX ADMIN — CEFTRIAXONE 100 MILLIGRAM(S): 500 INJECTION, POWDER, FOR SOLUTION INTRAMUSCULAR; INTRAVENOUS at 12:30

## 2020-09-12 DIAGNOSIS — Z02.9 ENCOUNTER FOR ADMINISTRATIVE EXAMINATIONS, UNSPECIFIED: ICD-10-CM

## 2020-09-12 LAB
ANION GAP SERPL CALC-SCNC: 12 MMOL/L — SIGNIFICANT CHANGE UP (ref 5–17)
BUN SERPL-MCNC: 22 MG/DL — SIGNIFICANT CHANGE UP (ref 7–23)
CALCIUM SERPL-MCNC: 10.1 MG/DL — SIGNIFICANT CHANGE UP (ref 8.4–10.5)
CHLORIDE SERPL-SCNC: 101 MMOL/L — SIGNIFICANT CHANGE UP (ref 96–108)
CO2 SERPL-SCNC: 27 MMOL/L — SIGNIFICANT CHANGE UP (ref 22–31)
CREAT SERPL-MCNC: 0.55 MG/DL — SIGNIFICANT CHANGE UP (ref 0.5–1.3)
GLUCOSE SERPL-MCNC: 127 MG/DL — HIGH (ref 70–99)
HCT VFR BLD CALC: 33.4 % — LOW (ref 34.5–45)
HGB BLD-MCNC: 11.1 G/DL — LOW (ref 11.5–15.5)
MCHC RBC-ENTMCNC: 32.3 PG — SIGNIFICANT CHANGE UP (ref 27–34)
MCHC RBC-ENTMCNC: 33.2 GM/DL — SIGNIFICANT CHANGE UP (ref 32–36)
MCV RBC AUTO: 97.1 FL — SIGNIFICANT CHANGE UP (ref 80–100)
NRBC # BLD: 0 /100 WBCS — SIGNIFICANT CHANGE UP (ref 0–0)
PLATELET # BLD AUTO: 385 K/UL — SIGNIFICANT CHANGE UP (ref 150–400)
POTASSIUM SERPL-MCNC: 3.7 MMOL/L — SIGNIFICANT CHANGE UP (ref 3.5–5.3)
POTASSIUM SERPL-SCNC: 3.7 MMOL/L — SIGNIFICANT CHANGE UP (ref 3.5–5.3)
RBC # BLD: 3.44 M/UL — LOW (ref 3.8–5.2)
RBC # FLD: 13.5 % — SIGNIFICANT CHANGE UP (ref 10.3–14.5)
SODIUM SERPL-SCNC: 140 MMOL/L — SIGNIFICANT CHANGE UP (ref 135–145)
WBC # BLD: 10.41 K/UL — SIGNIFICANT CHANGE UP (ref 3.8–10.5)
WBC # FLD AUTO: 10.41 K/UL — SIGNIFICANT CHANGE UP (ref 3.8–10.5)

## 2020-09-12 RX ADMIN — CEFTRIAXONE 100 MILLIGRAM(S): 500 INJECTION, POWDER, FOR SOLUTION INTRAMUSCULAR; INTRAVENOUS at 08:00

## 2020-09-12 RX ADMIN — ENOXAPARIN SODIUM 40 MILLIGRAM(S): 100 INJECTION SUBCUTANEOUS at 13:35

## 2020-09-12 RX ADMIN — RUFINAMIDE 400 MILLIGRAM(S): 40 SUSPENSION ORAL at 13:35

## 2020-09-12 RX ADMIN — RUFINAMIDE 400 MILLIGRAM(S): 40 SUSPENSION ORAL at 22:01

## 2020-09-12 RX ADMIN — RUFINAMIDE 400 MILLIGRAM(S): 40 SUSPENSION ORAL at 06:10

## 2020-09-13 LAB
ALBUMIN SERPL ELPH-MCNC: 2.8 G/DL — LOW (ref 3.3–5)
ALP SERPL-CCNC: 164 U/L — HIGH (ref 40–120)
ALT FLD-CCNC: 65 U/L — HIGH (ref 10–45)
ANION GAP SERPL CALC-SCNC: 14 MMOL/L — SIGNIFICANT CHANGE UP (ref 5–17)
APPEARANCE UR: ABNORMAL
AST SERPL-CCNC: 34 U/L — SIGNIFICANT CHANGE UP (ref 10–40)
BACTERIA # UR AUTO: NEGATIVE — SIGNIFICANT CHANGE UP
BASOPHILS # BLD AUTO: 0.06 K/UL — SIGNIFICANT CHANGE UP (ref 0–0.2)
BASOPHILS NFR BLD AUTO: 0.5 % — SIGNIFICANT CHANGE UP (ref 0–2)
BILIRUB SERPL-MCNC: 0.4 MG/DL — SIGNIFICANT CHANGE UP (ref 0.2–1.2)
BILIRUB UR-MCNC: NEGATIVE — SIGNIFICANT CHANGE UP
BUN SERPL-MCNC: 26 MG/DL — HIGH (ref 7–23)
CALCIUM SERPL-MCNC: 10.6 MG/DL — HIGH (ref 8.4–10.5)
CHLORIDE SERPL-SCNC: 98 MMOL/L — SIGNIFICANT CHANGE UP (ref 96–108)
CO2 SERPL-SCNC: 26 MMOL/L — SIGNIFICANT CHANGE UP (ref 22–31)
COLOR SPEC: ABNORMAL
COMMENT - URINE: SIGNIFICANT CHANGE UP
CREAT SERPL-MCNC: 0.65 MG/DL — SIGNIFICANT CHANGE UP (ref 0.5–1.3)
DIFF PNL FLD: NEGATIVE — SIGNIFICANT CHANGE UP
EOSINOPHIL # BLD AUTO: 0.11 K/UL — SIGNIFICANT CHANGE UP (ref 0–0.5)
EOSINOPHIL NFR BLD AUTO: 0.9 % — SIGNIFICANT CHANGE UP (ref 0–6)
EPI CELLS # UR: 20 /HPF — HIGH
GLUCOSE SERPL-MCNC: 111 MG/DL — HIGH (ref 70–99)
GLUCOSE UR QL: NEGATIVE — SIGNIFICANT CHANGE UP
HCT VFR BLD CALC: 35.6 % — SIGNIFICANT CHANGE UP (ref 34.5–45)
HGB BLD-MCNC: 11.6 G/DL — SIGNIFICANT CHANGE UP (ref 11.5–15.5)
HYALINE CASTS # UR AUTO: 53 /LPF — HIGH (ref 0–2)
IMM GRANULOCYTES NFR BLD AUTO: 1.2 % — SIGNIFICANT CHANGE UP (ref 0–1.5)
KETONES UR-MCNC: NEGATIVE — SIGNIFICANT CHANGE UP
LACTATE SERPL-SCNC: 3.3 MMOL/L — HIGH (ref 0.7–2)
LEUKOCYTE ESTERASE UR-ACNC: ABNORMAL
LYMPHOCYTES # BLD AUTO: 1.77 K/UL — SIGNIFICANT CHANGE UP (ref 1–3.3)
LYMPHOCYTES # BLD AUTO: 14.9 % — SIGNIFICANT CHANGE UP (ref 13–44)
MCHC RBC-ENTMCNC: 32.2 PG — SIGNIFICANT CHANGE UP (ref 27–34)
MCHC RBC-ENTMCNC: 32.6 GM/DL — SIGNIFICANT CHANGE UP (ref 32–36)
MCV RBC AUTO: 98.9 FL — SIGNIFICANT CHANGE UP (ref 80–100)
MONOCYTES # BLD AUTO: 0.59 K/UL — SIGNIFICANT CHANGE UP (ref 0–0.9)
MONOCYTES NFR BLD AUTO: 5 % — SIGNIFICANT CHANGE UP (ref 2–14)
NEUTROPHILS # BLD AUTO: 9.2 K/UL — HIGH (ref 1.8–7.4)
NEUTROPHILS NFR BLD AUTO: 77.5 % — HIGH (ref 43–77)
NITRITE UR-MCNC: NEGATIVE — SIGNIFICANT CHANGE UP
NRBC # BLD: 0 /100 WBCS — SIGNIFICANT CHANGE UP (ref 0–0)
PH UR: 8 — SIGNIFICANT CHANGE UP (ref 5–8)
PLATELET # BLD AUTO: 465 K/UL — HIGH (ref 150–400)
POTASSIUM SERPL-MCNC: 4 MMOL/L — SIGNIFICANT CHANGE UP (ref 3.5–5.3)
POTASSIUM SERPL-SCNC: 4 MMOL/L — SIGNIFICANT CHANGE UP (ref 3.5–5.3)
PROT SERPL-MCNC: 6.8 G/DL — SIGNIFICANT CHANGE UP (ref 6–8.3)
PROT UR-MCNC: ABNORMAL
RBC # BLD: 3.6 M/UL — LOW (ref 3.8–5.2)
RBC # FLD: 13.4 % — SIGNIFICANT CHANGE UP (ref 10.3–14.5)
RBC CASTS # UR COMP ASSIST: 3 /HPF — SIGNIFICANT CHANGE UP (ref 0–4)
SODIUM SERPL-SCNC: 138 MMOL/L — SIGNIFICANT CHANGE UP (ref 135–145)
SP GR SPEC: 1.01 — SIGNIFICANT CHANGE UP (ref 1.01–1.02)
UROBILINOGEN FLD QL: ABNORMAL
WBC # BLD: 11.87 K/UL — HIGH (ref 3.8–10.5)
WBC # FLD AUTO: 11.87 K/UL — HIGH (ref 3.8–10.5)
WBC UR QL: 11 /HPF — HIGH (ref 0–5)

## 2020-09-13 PROCEDURE — 93010 ELECTROCARDIOGRAM REPORT: CPT

## 2020-09-13 PROCEDURE — 71045 X-RAY EXAM CHEST 1 VIEW: CPT | Mod: 26

## 2020-09-13 RX ORDER — SODIUM CHLORIDE 9 MG/ML
1000 INJECTION INTRAMUSCULAR; INTRAVENOUS; SUBCUTANEOUS ONCE
Refills: 0 | Status: COMPLETED | OUTPATIENT
Start: 2020-09-13 | End: 2020-09-13

## 2020-09-13 RX ADMIN — RUFINAMIDE 400 MILLIGRAM(S): 40 SUSPENSION ORAL at 15:11

## 2020-09-13 RX ADMIN — RUFINAMIDE 400 MILLIGRAM(S): 40 SUSPENSION ORAL at 21:29

## 2020-09-13 RX ADMIN — SODIUM CHLORIDE 1000 MILLILITER(S): 9 INJECTION INTRAMUSCULAR; INTRAVENOUS; SUBCUTANEOUS at 20:42

## 2020-09-13 RX ADMIN — RUFINAMIDE 400 MILLIGRAM(S): 40 SUSPENSION ORAL at 05:34

## 2020-09-13 RX ADMIN — ENOXAPARIN SODIUM 40 MILLIGRAM(S): 100 INJECTION SUBCUTANEOUS at 11:00

## 2020-09-13 RX ADMIN — CEFTRIAXONE 100 MILLIGRAM(S): 500 INJECTION, POWDER, FOR SOLUTION INTRAMUSCULAR; INTRAVENOUS at 11:00

## 2020-09-13 NOTE — PROVIDER CONTACT NOTE (CRITICAL VALUE NOTIFICATION) - ASSESSMENT
Lactate 3.3. HR elevated at 128 and tachypnic to 26. All other VSS. Patient denies pain. A&Ox1-2 at baseline. No s/s distress. IVF infusing at 50 cc per hour as ordered. Goodson to bedside for retention with adequate urine output.

## 2020-09-13 NOTE — CHART NOTE - NSCHARTNOTEFT_GEN_A_CORE
Internal Medicine PA Note     TO BE COMPLETED WITHIN 6 HOURS OF INITIAL ASSESSMENT:    For use in patients that have 2 sepsis criteria and new organ dysfunction   •	Tachycardia - HR 120s  •	Lactate >2  - lactate 3.3    If patient persistent hypotension (SBP<90) or any lactate >4 then provider evaluation (Physician/PA/NP) within 30 minutes of bolus completion is required.    Vital Signs Last 24 Hrs  T(C): 36.2 (13 Sep 2020 19:47), Max: 37.2 (13 Sep 2020 17:55)  T(F): 97.2 (13 Sep 2020 19:47), Max: 98.9 (13 Sep 2020 17:55)  HR: 128 (13 Sep 2020 19:47) (104 - 128)  BP: 102/64 (13 Sep 2020 19:47) (86/62 - 114/75)  BP(mean): --  RR: 26 (13 Sep 2020 19:47) (18 - 26)  SpO2: 98% (13 Sep 2020 19:47) (95% - 99%)  		  LUNGS:  [ X]Clear bilaterally [  ] Wheeze [  ] Rhonchi [  ] Rales [  ] Crackles; Other:  HEART: [  ]RRR [  ] No murmur[  ]  Normal S1S2[ X] Tachycardia;  Other:  CAPILLARY REFiLL:  	Fingers: [  X less than 2 seconds [  ] more than 2 seconds                                           Toes: [  ]  less than 2 seconds [  ] more than 2 seconds   PERIPHERAL PULSES:  Radial: [ X] Palpable  [  ]  non-palpable                                         Dorsalis Pedis: [  ] Palpable  [  ] non-palpable                                         Posterior Tibial: [  ] Palpable  [  ] non-palpable                                          Other:  SKIN:   [  ]  Diaphoretic  [  ]  mottling  [  ]  Cold extremities  [  ]  Warm [ X]  Dry                      Other:    BEDSIDE ULTRASOUND FINDINGS (IF APPLICABLE):    Labs:  13 Sep 2020 18:23    138    |  98     |  26     ----------------------------<  111    4.0     |  26     |  0.65     Ca    10.6       13 Sep 2020 18:23    TPro  6.8    /  Alb  2.8    /  TBili  0.4    /  DBili  x      /  AST  34     /  ALT  65     /  AlkPhos  164    13 Sep 2020 18:23                          11.6   11.87 )-----------( 465      ( 13 Sep 2020 18:23 )             35.6       Lactate:Lactate, Blood: 3.3 mmol/L <H> [0.7 - 2.0] (09-13 @ 18:23)      Plan (orders must be placed in EMR):     Patient placed on telemetry for HR 110s- 120s  NS 1L Bolus STAT  Repeat lactate  D/w RN   Report given to [facility] NP  Oncall [facility] attending (Dr. Mcfarland) notified      Care Discussed with Consultants/Other Providers [ ] YES  [ ] NO Internal Medicine PA Note     3 HR SEPSIS NOTE    TO BE COMPLETED WITHIN 6 HOURS OF INITIAL ASSESSMENT:    For use in patients that have 2 sepsis criteria and new organ dysfunction   •	Tachycardia - HR 120s - 130s.   •	Lactate >2  - lactate 3.3    If patient persistent hypotension (SBP<90) or any lactate >4 then provider evaluation (Physician/PA/NP) within 30 minutes of bolus completion is required.    Vital Signs Last 24 Hrs  T(C): 36.2 (13 Sep 2020 19:47), Max: 37.2 (13 Sep 2020 17:55)  T(F): 97.2 (13 Sep 2020 19:47), Max: 98.9 (13 Sep 2020 17:55)  HR: 128 (13 Sep 2020 19:47) (104 - 128)  BP: 102/64 (13 Sep 2020 19:47) (86/62 - 114/75)  BP(mean): --  RR: 26 (13 Sep 2020 19:47) (18 - 26)  SpO2: 98% (13 Sep 2020 19:47) (95% - 99%)  		  LUNGS:  [ X]Clear bilaterally [  ] Wheeze [  ] Rhonchi [  ] Rales [  ] Crackles; Other:  HEART: [  ]RRR [  ] No murmur[  ]  Normal S1S2[ X] Tachycardia;  Other:  CAPILLARY REFiLL:  	Fingers: [  X less than 2 seconds [  ] more than 2 seconds                                           Toes: [  ]  less than 2 seconds [  ] more than 2 seconds   PERIPHERAL PULSES:  Radial: [ X] Palpable  [  ]  non-palpable                                         Dorsalis Pedis: [  ] Palpable  [  ] non-palpable                                         Posterior Tibial: [  ] Palpable  [  ] non-palpable                                          Other:  SKIN:   [  ]  Diaphoretic  [  ]  mottling  [  ]  Cold extremities  [  ]  Warm [ X]  Dry                      Other:    BEDSIDE ULTRASOUND FINDINGS (IF APPLICABLE):    Labs:  13 Sep 2020 18:23    138    |  98     |  26     ----------------------------<  111    4.0     |  26     |  0.65     Ca    10.6       13 Sep 2020 18:23    TPro  6.8    /  Alb  2.8    /  TBili  0.4    /  DBili  x      /  AST  34     /  ALT  65     /  AlkPhos  164    13 Sep 2020 18:23                          11.6   11.87 )-----------( 465      ( 13 Sep 2020 18:23 )             35.6       Lactate:Lactate, Blood: 3.3 mmol/L <H> [0.7 - 2.0] (09-13 @ 18:23)      Plan (orders must be placed in EMR):     EKG STAT - Sinus Tachycardia 130bpm with no acute ST/Twave changes  Pt placed on telemetry for further monitoring.   1L NS Bolus Stat, while receiving bolus, pt's hr on tele decreased to low 100sbpm to 110sbpm. Patient's baseline HR while on admission sinus tachycardia 100sbpm  F/u Repeat Lactate  D/w RN  Report given to 9[facility]i NP  D/w oncall [facility] attending (Dr. Mcfarland)    Care Discussed with Consultants/Other Providers [ ] YES  [ ] NO Internal Medicine PA Note     3 HR SEPSIS NOTE    TO BE COMPLETED WITHIN 6 HOURS OF INITIAL ASSESSMENT:    For use in patients that have 2 sepsis criteria and new organ dysfunction   •	Tachycardia - HR 120s - 130s.   •	Lactate >2  - lactate 3.3    If patient persistent hypotension (SBP<90) or any lactate >4 then provider evaluation (Physician/PA/NP) within 30 minutes of bolus completion is required.    Vital Signs Last 24 Hrs  T(C): 36.2 (13 Sep 2020 19:47), Max: 37.2 (13 Sep 2020 17:55)  T(F): 97.2 (13 Sep 2020 19:47), Max: 98.9 (13 Sep 2020 17:55)  HR: 128 (13 Sep 2020 19:47) (104 - 128)  BP: 102/64 (13 Sep 2020 19:47) (86/62 - 114/75)  BP(mean): --  RR: 26 (13 Sep 2020 19:47) (18 - 26)  SpO2: 98% (13 Sep 2020 19:47) (95% - 99%)  		  LUNGS:  [ X]Clear bilaterally [  ] Wheeze [  ] Rhonchi [  ] Rales [  ] Crackles; Other:  HEART: [  ]RRR [  ] No murmur[  ]  Normal S1S2[ X] Tachycardia;  Other:  CAPILLARY REFiLL:  	Fingers: [  X less than 2 seconds [  ] more than 2 seconds                                           Toes: [  ]  less than 2 seconds [  ] more than 2 seconds   PERIPHERAL PULSES:  Radial: [ X] Palpable  [  ]  non-palpable                                         Dorsalis Pedis: [  ] Palpable  [  ] non-palpable                                         Posterior Tibial: [  ] Palpable  [  ] non-palpable                                          Other:  SKIN:   [  ]  Diaphoretic  [  ]  mottling  [  ]  Cold extremities  [  ]  Warm [ X]  Dry                      Other:    BEDSIDE ULTRASOUND FINDINGS (IF APPLICABLE):    Labs:  13 Sep 2020 18:23    138    |  98     |  26     ----------------------------<  111    4.0     |  26     |  0.65     Ca    10.6       13 Sep 2020 18:23    TPro  6.8    /  Alb  2.8    /  TBili  0.4    /  DBili  x      /  AST  34     /  ALT  65     /  AlkPhos  164    13 Sep 2020 18:23                          11.6   11.87 )-----------( 465      ( 13 Sep 2020 18:23 )             35.6       Lactate:Lactate, Blood: 3.3 mmol/L <H> [0.7 - 2.0] (09-13 @ 18:23)      Plan (orders must be placed in EMR):     EKG STAT - Sinus Tachycardia 130bpm with no acute ST/Twave changes  Pt placed on telemetry for further monitoring.   1L NS Bolus Stat, while receiving bolus, pt's hr on tele decreased to low 100sbpm to 110sbpm. Patient's baseline HR while on admission sinus tachycardia 100sbpm  F/u Repeat Lactate  D/w RN  Report given to 9[facility]i NP  D/w oncall [facility] attending (Dr. Mcfarland)    Emanuel BETTS  Dept of Medicine   [id number]  Care Discussed with Consultants/Other Providers [ ] YES  [ ] NO Internal Medicine PA Note     3 HR SEPSIS NOTE    TO BE COMPLETED WITHIN 6 HOURS OF INITIAL ASSESSMENT:    For use in patients that have 2 sepsis criteria and new organ dysfunction   •	Tachycardia - HR 120s - 130s.   •	Lactate >2  - lactate 3.3    If patient persistent hypotension (SBP<90) or any lactate >4 then provider evaluation (Physician/PA/NP) within 30 minutes of bolus completion is required.    Vital Signs Last 24 Hrs  T(C): 36.2 (13 Sep 2020 19:47), Max: 37.2 (13 Sep 2020 17:55)  T(F): 97.2 (13 Sep 2020 19:47), Max: 98.9 (13 Sep 2020 17:55)  HR: 128 (13 Sep 2020 19:47) (104 - 128)  BP: 102/64 (13 Sep 2020 19:47) (86/62 - 114/75)  BP(mean): --  RR: 26 (13 Sep 2020 19:47) (18 - 26)  SpO2: 98% (13 Sep 2020 19:47) (95% - 99%)  		  LUNGS:  [ X]Clear bilaterally [  ] Wheeze [  ] Rhonchi [  ] Rales [  ] Crackles; Other:  HEART: [  ]RRR [  ] No murmur[  ]  Normal S1S2[ X] Tachycardia;  Other:  CAPILLARY REFiLL:  	Fingers: [  X less than 2 seconds [  ] more than 2 seconds                                           Toes: [  ]  less than 2 seconds [  ] more than 2 seconds   PERIPHERAL PULSES:  Radial: [ X] Palpable  [  ]  non-palpable                                         Dorsalis Pedis: [  ] Palpable  [  ] non-palpable                                         Posterior Tibial: [  ] Palpable  [  ] non-palpable                                          Other:  SKIN:   [  ]  Diaphoretic  [  ]  mottling  [  ]  Cold extremities  [  ]  Warm [ X]  Dry                      Other:    BEDSIDE ULTRASOUND FINDINGS (IF APPLICABLE):    Labs:  13 Sep 2020 18:23    138    |  98     |  26     ----------------------------<  111    4.0     |  26     |  0.65     Ca    10.6       13 Sep 2020 18:23    TPro  6.8    /  Alb  2.8    /  TBili  0.4    /  DBili  x      /  AST  34     /  ALT  65     /  AlkPhos  164    13 Sep 2020 18:23                          11.6   11.87 )-----------( 465      ( 13 Sep 2020 18:23 )             35.6       Lactate:Lactate, Blood: 3.3 mmol/L <H> [0.7 - 2.0] (09-13 @ 18:23)      Plan (orders must be placed in EMR):     EKG STAT - Sinus Tachycardia 130bpm with no acute ST/Twave changes  Pt placed on telemetry for further monitoring.   1L NS Bolus Stat, while receiving bolus, pt's hr on tele decreased to low 100sbpm to 110sbpm. Patient's baseline HR while on admission sinus tachycardia 100sbpm  F/u Repeat Lactate  F/u PanCx  D/w RN  Report given to 9Monti NP  [facility]    Emanuel BETTS  Dept of Medicine   [id number]  Care Discussed with Consultants/Other Providers [ ] YES  [ ] NO

## 2020-09-13 NOTE — PROVIDER CONTACT NOTE (OTHER) - RECOMMENDATIONS
notify NP. ekg completed, stat labs, blood cultures. chest xray completed. pending urine culture
Come to see patient?
Cont to monitor
straight cath?

## 2020-09-13 NOTE — CHART NOTE - NSCHARTNOTEFT_GEN_A_CORE
Patient is a 88y old  Female who presents with a chief complaint of 88F presenting with generalized weakness (13 Sep 2020 11:39)      SUBJECTIVE / OVERNIGHT EVENTS:    MEDICATIONS  (STANDING):  cefTRIAXone   IVPB 1000 milliGRAM(s) IV Intermittent every 24 hours  enoxaparin Injectable 40 milliGRAM(s) SubCutaneous daily  rufinamide 400 milliGRAM(s) Oral <User Schedule>  sodium chloride 0.9%. 1000 milliLiter(s) (50 mL/Hr) IV Continuous <Continuous>    MEDICATIONS  (PRN):        CAPILLARY BLOOD GLUCOSE        I&O's Summary    12 Sep 2020 07:01  -  13 Sep 2020 07:00  --------------------------------------------------------  IN: 1305 mL / OUT: 1100 mL / NET: 205 mL    13 Sep 2020 07:01  -  13 Sep 2020 17:58  --------------------------------------------------------  IN: 850 mL / OUT: 400 mL / NET: 450 mL        PHYSICAL EXAM:  GENERAL: NAD, well-developed  HEAD:  Atraumatic, Normocephalic  EYES: EOMI, PERRLA, conjunctiva and sclera clear  NECK: Supple, No JVD  CHEST/LUNG: Clear to auscultation bilaterally; No wheeze  HEART: Regular rate and rhythm; No murmurs, rubs, or gallops  ABDOMEN: Soft, Nontender, Nondistended; Bowel sounds present  EXTREMITIES:  2+ Peripheral Pulses, No clubbing, cyanosis, or edema  PSYCH: AAOx3  NEUROLOGY: non-focal  SKIN: No rashes or lesions    PMH:  Dementia    Memory loss due to medical condition    Hypercholesterolemia    IBS (irritable bowel syndrome)    Seizure disorder    Meningitis          LABS:                        11.1   10.41 )-----------( 385      ( 12 Sep 2020 07:13 )             33.4     09-12    140  |  101  |  22  ----------------------------<  127<H>  3.7   |  27  |  0.55    Ca    10.1      12 Sep 2020 07:13        Chief complaint: Called by RN to report patient with tachycardia  - Patient seen and examined, denies all complaints   - pt with Heart rate 120's on tele, SBp 80-90's  - Ekg ordered/ Sepsis panel ordered/ CXR/ Gentle hydration   - Pt currently on Ceftriaxone for UTI  / Infectious disease following   - Will cont to monitor . discussed w Dr. Preston ( agreed w above)   - Will sign out to night team to follow up

## 2020-09-13 NOTE — PROVIDER CONTACT NOTE (OTHER) - SITUATION
pt hypotensive, BP manually 80s systolic/60. -130. pt denies distress. tachynpneic at 25 respirations. denies pain. afebrile rectally 98.9. pt feels warm denies shortness of breath chest pain

## 2020-09-13 NOTE — PROVIDER CONTACT NOTE (CRITICAL VALUE NOTIFICATION) - ACTION/TREATMENT ORDERED:
PA Affra to bedside to assess patient. Do EKG & give patient 1L normal saline bolus at this time. Repeat lactate to be drawn after fluids completed. Will cont to monitor.

## 2020-09-14 LAB
LACTATE SERPL-SCNC: 1 MMOL/L — SIGNIFICANT CHANGE UP (ref 0.7–2)
LACTATE SERPL-SCNC: 1.3 MMOL/L — SIGNIFICANT CHANGE UP (ref 0.7–2)

## 2020-09-14 RX ADMIN — RUFINAMIDE 400 MILLIGRAM(S): 40 SUSPENSION ORAL at 14:15

## 2020-09-14 RX ADMIN — ENOXAPARIN SODIUM 40 MILLIGRAM(S): 100 INJECTION SUBCUTANEOUS at 14:15

## 2020-09-14 RX ADMIN — RUFINAMIDE 400 MILLIGRAM(S): 40 SUSPENSION ORAL at 22:15

## 2020-09-14 RX ADMIN — CEFTRIAXONE 100 MILLIGRAM(S): 500 INJECTION, POWDER, FOR SOLUTION INTRAMUSCULAR; INTRAVENOUS at 10:01

## 2020-09-14 RX ADMIN — RUFINAMIDE 400 MILLIGRAM(S): 40 SUSPENSION ORAL at 05:15

## 2020-09-14 NOTE — PROVIDER CONTACT NOTE (OTHER) - ASSESSMENT
shah with good urine output of 400cc
All VSS, no c/o of pain, mild abd distention. no c/o of tenderness.
All VSS. No c/o pain or discomfort. Bladder scan shows >450cc in bladder.
HR elevated at 128 and tachypnic to 26. All other VSS. Patient denies pain. A&Ox1-2 at baseline. IVF infusing at 50 cc per hour as ordered. Goodson to bedside for retention with adequate urine output. Labs sent and chest xray completed earlier in the day. Lactate elevated at 3.3. +UTI
No c/o pain SOB or distress
Pt A&Ox2. HR 130s, other vss. pt reports no complaints at this time, pt appears to be asymptomatic. tele monitoring in place.

## 2020-09-14 NOTE — PROVIDER CONTACT NOTE (OTHER) - ACTION/TREATMENT ORDERED:
notify NP. Np aware. pt has been straight catheterized 2x within 24 hours. Np aware awaiting interventions
NP at bedside to assess patient. will monitor
PA aware, as per PA continue to monitor & notify provider if HR is above 130.
Team aware. Will repeat bladder scan at 0530.
Cont to monitor pt is asymptomatic. Pt resting comfortably in bed. Call bell within reach will cont to monitor
NP notified and aware. Will order to straight cath pt and f/u with TOV. Will continue to monitor and maintain safety.
PA Affra to bedside to see patient. EKG completed & placed in chart. 1L NS bolus given as ordered. Patient to be transferred to telemetry unit. Awaiting telemetry bed. No further intervention required

## 2020-09-15 DIAGNOSIS — I95.9 HYPOTENSION, UNSPECIFIED: ICD-10-CM

## 2020-09-15 LAB
ALBUMIN SERPL ELPH-MCNC: 2.7 G/DL — LOW (ref 3.3–5)
ALP SERPL-CCNC: 133 U/L — HIGH (ref 40–120)
ALT FLD-CCNC: 40 U/L — SIGNIFICANT CHANGE UP (ref 10–45)
ANION GAP SERPL CALC-SCNC: 10 MMOL/L — SIGNIFICANT CHANGE UP (ref 5–17)
AST SERPL-CCNC: 23 U/L — SIGNIFICANT CHANGE UP (ref 10–40)
BASOPHILS # BLD AUTO: 0.06 K/UL — SIGNIFICANT CHANGE UP (ref 0–0.2)
BASOPHILS NFR BLD AUTO: 0.7 % — SIGNIFICANT CHANGE UP (ref 0–2)
BILIRUB SERPL-MCNC: 0.2 MG/DL — SIGNIFICANT CHANGE UP (ref 0.2–1.2)
BUN SERPL-MCNC: 26 MG/DL — HIGH (ref 7–23)
CALCIUM SERPL-MCNC: 9.9 MG/DL — SIGNIFICANT CHANGE UP (ref 8.4–10.5)
CHLORIDE SERPL-SCNC: 104 MMOL/L — SIGNIFICANT CHANGE UP (ref 96–108)
CO2 SERPL-SCNC: 28 MMOL/L — SIGNIFICANT CHANGE UP (ref 22–31)
CREAT SERPL-MCNC: 0.59 MG/DL — SIGNIFICANT CHANGE UP (ref 0.5–1.3)
CULTURE RESULTS: NO GROWTH — SIGNIFICANT CHANGE UP
EOSINOPHIL # BLD AUTO: 0.43 K/UL — SIGNIFICANT CHANGE UP (ref 0–0.5)
EOSINOPHIL NFR BLD AUTO: 5.2 % — SIGNIFICANT CHANGE UP (ref 0–6)
GLUCOSE SERPL-MCNC: 101 MG/DL — HIGH (ref 70–99)
HCT VFR BLD CALC: 33.2 % — LOW (ref 34.5–45)
HGB BLD-MCNC: 10.9 G/DL — LOW (ref 11.5–15.5)
IMM GRANULOCYTES NFR BLD AUTO: 1.1 % — SIGNIFICANT CHANGE UP (ref 0–1.5)
LYMPHOCYTES # BLD AUTO: 1.58 K/UL — SIGNIFICANT CHANGE UP (ref 1–3.3)
LYMPHOCYTES # BLD AUTO: 19.3 % — SIGNIFICANT CHANGE UP (ref 13–44)
MCHC RBC-ENTMCNC: 32.8 GM/DL — SIGNIFICANT CHANGE UP (ref 32–36)
MCHC RBC-ENTMCNC: 33.2 PG — SIGNIFICANT CHANGE UP (ref 27–34)
MCV RBC AUTO: 101.2 FL — HIGH (ref 80–100)
MONOCYTES # BLD AUTO: 0.57 K/UL — SIGNIFICANT CHANGE UP (ref 0–0.9)
MONOCYTES NFR BLD AUTO: 7 % — SIGNIFICANT CHANGE UP (ref 2–14)
NEUTROPHILS # BLD AUTO: 5.47 K/UL — SIGNIFICANT CHANGE UP (ref 1.8–7.4)
NEUTROPHILS NFR BLD AUTO: 66.7 % — SIGNIFICANT CHANGE UP (ref 43–77)
NRBC # BLD: 0 /100 WBCS — SIGNIFICANT CHANGE UP (ref 0–0)
PLATELET # BLD AUTO: 484 K/UL — HIGH (ref 150–400)
POTASSIUM SERPL-MCNC: 3.8 MMOL/L — SIGNIFICANT CHANGE UP (ref 3.5–5.3)
POTASSIUM SERPL-SCNC: 3.8 MMOL/L — SIGNIFICANT CHANGE UP (ref 3.5–5.3)
PROT SERPL-MCNC: 6.2 G/DL — SIGNIFICANT CHANGE UP (ref 6–8.3)
RBC # BLD: 3.28 M/UL — LOW (ref 3.8–5.2)
RBC # FLD: 13.6 % — SIGNIFICANT CHANGE UP (ref 10.3–14.5)
SODIUM SERPL-SCNC: 142 MMOL/L — SIGNIFICANT CHANGE UP (ref 135–145)
SPECIMEN SOURCE: SIGNIFICANT CHANGE UP
WBC # BLD: 8.2 K/UL — SIGNIFICANT CHANGE UP (ref 3.8–10.5)
WBC # FLD AUTO: 8.2 K/UL — SIGNIFICANT CHANGE UP (ref 3.8–10.5)

## 2020-09-15 RX ADMIN — RUFINAMIDE 400 MILLIGRAM(S): 40 SUSPENSION ORAL at 14:54

## 2020-09-15 RX ADMIN — RUFINAMIDE 400 MILLIGRAM(S): 40 SUSPENSION ORAL at 21:34

## 2020-09-15 RX ADMIN — ENOXAPARIN SODIUM 40 MILLIGRAM(S): 100 INJECTION SUBCUTANEOUS at 12:22

## 2020-09-15 RX ADMIN — CEFTRIAXONE 100 MILLIGRAM(S): 500 INJECTION, POWDER, FOR SOLUTION INTRAMUSCULAR; INTRAVENOUS at 09:39

## 2020-09-15 RX ADMIN — RUFINAMIDE 400 MILLIGRAM(S): 40 SUSPENSION ORAL at 05:17

## 2020-09-15 NOTE — CHART NOTE - NSCHARTNOTEFT_GEN_A_CORE
Source: Patient [x ]   X RN, dOette  Patient seen for routine length of stay follow up for underweight status.  Patient overheard calling out "Ki.  Help me Ki"  throughout the morning.  Upon visit, Patient being fedby RN and Patient extremely chatty rehashing her history and life since childhood throughout marriage and children.  Difficult for RN to feed Patient as she was constantly talking.  Difficult to redirect due to dementia.  Patient did confirm that she is Kosher and that she is hungry.  As per RN, feels that Patient forgets to eat because of dementia but will eat when spoonfed.  Observed no signs of chewing or swallowing difficulty.  Consumed a bottle of Ensure Enlive, container of orange juice and some banana.  RN continues to encourage.  Appears thin and frail.    Diet : Regular, Kosher, NO Nuts.  Also allergy to green peas.     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 Hypotension and Tachycardia         PO intake:  < 50% [ ] 50-75% [ x]   % [ ]  other :     Source for PO intake RN and observation      Current Weight:  No new weight since admit.  100 pounds with BMI 17.2  % Weight Change    Pertinent Medications: MEDICATIONS  (STANDING):  cefTRIAXone   IVPB 1000 milliGRAM(s) IV Intermittent every 24 hours  enoxaparin Injectable 40 milliGRAM(s) SubCutaneous daily  rufinamide 400 milliGRAM(s) Oral <User Schedule>  sodium chloride 0.9%. 1000 milliLiter(s) (50 mL/Hr) IV Continuous <Continuous>    MEDICATIONS  (PRN):    Pertinent Labs:  09-15 Na142 mmol/L Glu 101 mg/dL<H> K+ 3.8 mmol/L Cr  0.59 mg/dL BUN 26 mg/dL<H> 09-15 Alb 2.7 g/dL<L>      Skin: Red blanchable sacrum    Estimated Needs:   [x ] no change since previous assessment  [ ] recalculated:       Previous Nutrition Diagnosis:     [x ] Underweight likely due to advanced age, dementia and self care deficit         Nutrition Diagnosis is [x ] ongoing  but addressed with total assist at meals as well as Ensure Enlive x2 which Patient accepts.   Care plan in place       New Nutrition Diagnosis: [ x] not applicable        Interventions:     Recommend:  Continue Regular Kosher diet  No Nuts, green peas  Monitor diet tolerance, po intake, GI tolerance, weight trends, labs, and skin integrity  Ensure Enlive 8 ounces x2  Total assist at meals  RDN remains available for follow up     Esthela Miller MA,RD,CHES,CDN  Beeper [phone]

## 2020-09-16 ENCOUNTER — TRANSCRIPTION ENCOUNTER (OUTPATIENT)
Age: 85
End: 2020-09-16

## 2020-09-16 RX ADMIN — ENOXAPARIN SODIUM 40 MILLIGRAM(S): 100 INJECTION SUBCUTANEOUS at 11:40

## 2020-09-16 RX ADMIN — RUFINAMIDE 400 MILLIGRAM(S): 40 SUSPENSION ORAL at 05:06

## 2020-09-16 RX ADMIN — RUFINAMIDE 400 MILLIGRAM(S): 40 SUSPENSION ORAL at 22:07

## 2020-09-16 RX ADMIN — RUFINAMIDE 400 MILLIGRAM(S): 40 SUSPENSION ORAL at 13:36

## 2020-09-16 RX ADMIN — CEFTRIAXONE 100 MILLIGRAM(S): 500 INJECTION, POWDER, FOR SOLUTION INTRAMUSCULAR; INTRAVENOUS at 09:41

## 2020-09-16 NOTE — DISCHARGE NOTE PROVIDER - NSDCDCMDCOMP_GEN_ALL_CORE
This document is complete and the patient is ready for discharge.
Adrenal insufficiency    Aortic disease  leaky valve  Aortic insufficiency  mod/severe AI on echo 9/25/16  Asthma    Atrial fibrillation    Colorectal cancer  7/2017- last treatment , chemo and radiation  COPD (chronic obstructive pulmonary disease)    Diabetes  type two. insulin dependent  Pelvic fracture    Tracheobronchomalacia

## 2020-09-16 NOTE — DISCHARGE NOTE PROVIDER - NSDCCPCAREPLAN_GEN_ALL_CORE_FT
PRINCIPAL DISCHARGE DIAGNOSIS  Diagnosis: UTI (urinary tract infection)  Assessment and Plan of Treatment: ·  Problem: Sepsis, due to unspecified organism, unspecified whether acute organ dysfunction present.  Plan: - fever, tachycardia, leukocytosis, UA+  - resolved  - continue with Ceftriaxone IV. No evidence of allergic reaction. Family reports distant penicillin allergy without known reaction with rash or anaphylaxis.  - blood culture neg. urine culture with pansensitive E.coli  - repancultured 9/13/20,all cultures negative to date  -  upon discharge, oral cefpodoxime 200 mg po bid til sept 17.         SECONDARY DISCHARGE DIAGNOSES  Diagnosis: Dementia without behavioral disturbance, unspecified dementia type  Assessment and Plan of Treatment: ·  Problem: Dementia without behavioral disturbance, unspecified dementia type.  Plan: -  currently holding memantine.       Diagnosis: Seizure disorder  Assessment and Plan of Treatment: ·  Problem: Seizure disorder.  Plan: c/w home dosing of Banzel 400 mg q8hrs  - drug level collected in ED  - Neurology eval appreciated.  - if EEG not done by the time rehab placement, can do it as outpt. Likely won't change the management since pt is back to baseline.        PRINCIPAL DISCHARGE DIAGNOSIS  Diagnosis: UTI (urinary tract infection)  Assessment and Plan of Treatment: ·  Problem: Sepsis, due to unspecified organism, unspecified whether acute organ dysfunction present.  Plan: - fever, tachycardia, leukocytosis, UA+  - resolved  - continue with Ceftriaxone IV. No evidence of allergic reaction. Family reports distant penicillin allergy without known reaction with rash or anaphylaxis.  - blood culture neg. urine culture with pansensitive E.coli  - repancultured 9/13/20,all cultures negative to date  -  upon discharge, oral cefpodoxime 200 mg po bid til sept 17.         SECONDARY DISCHARGE DIAGNOSES  Diagnosis: Urinary retention  Assessment and Plan of Treatment: Patient with urinary retention, Goodson re-inserted --TOV  and urology follow-up in [facility].    Diagnosis: Dementia without behavioral disturbance, unspecified dementia type  Assessment and Plan of Treatment: ·  Problem: Dementia without behavioral disturbance, unspecified dementia type.  Plan: -  currently holding memantine.       Diagnosis: Seizure disorder  Assessment and Plan of Treatment: ·  Problem: Seizure disorder.  Plan: c/w home dosing of Banzel 400 mg q8hrs  - drug level collected in ED  - Neurology eval appreciated.  - if EEG not done by the time rehab placement, can do it as outpt. Likely won't change the management since pt is back to baseline.        PRINCIPAL DISCHARGE DIAGNOSIS  Diagnosis: UTI (urinary tract infection)  Assessment and Plan of Treatment: Treated, follow-up with your PCP for further monitoring and treatment.        SECONDARY DISCHARGE DIAGNOSES  Diagnosis: Urinary retention  Assessment and Plan of Treatment: Patient with urinary retention, Goodson re-inserted --TOV  and urology follow-up in [facility].    Diagnosis: Dementia without behavioral disturbance, unspecified dementia type  Assessment and Plan of Treatment: ·  Problem: Dementia without behavioral disturbance, unspecified dementia type.   memantine on hold for now   Follow-up with Neurologist ---call for appoinment       Diagnosis: Seizure disorder  Assessment and Plan of Treatment: ·  Problem: Seizure disorder.  Plan: c/w home dosing of Banzel 400 mg q8hrs  - drug level collected in ED  - Neurology eval appreciated.  - if EEG not done by the time rehab placement, can do it as outpt. Likely won't change the management since pt is back to baseline.

## 2020-09-16 NOTE — DISCHARGE NOTE PROVIDER - HOSPITAL COURSE
Patient has dementia and is unable to provide history. Therefore HPI collected from her  Mr. Johnny Schwartz via telephone    88F w/ hx of dementia, pediatric meningitis c/b mild cognitive impairment, eclampsia/seizure disorder (last seizure Feb2020), eczema presents to [facility] for evaluation of generalized weakness. The patient reportedly has been having worsening gait instability and generalized weakness over last week. She was evaluated for typical check up with PCP 6d prior on 8/28/20 and on 9/1 they were informed she had UTI and was prescribed ciprofloxacin which was picked up but not started. She reportedly has felt warm but had no complaints to . On day of presentation, she was assisted to use the bathroom and then had difficulty getting off of the toilet and  had to lift her off. Once on her feet she appeared to be stumbling and generally weak and was lowered to the floor as she was about to fall backward. No reported head strike but patient could not get up from ground and therefore ems called. Of note patient has been working with PT for months for gait instability and last week she was found on kitchen floor by  but she could not remember what occurred. Last seizure Feb2020, and more recently has switched from divalproex back to Banzel 3d ago.  attributes divalproex and new memantine for her depression and therefore has stopped medications (memantine is still Rx by neurologist). Patient reports penicillin allergy as a child and  reports no known rash or anaphylaxis reaction.    In the ED, .5, 152/75, 120, 18 99%RA  s/p ceftriaxone 1gx1, 1L LR, acetaminophen 650  Patient was admitted and diagnosed sepsis secondary to  Ecoli uti and started on rocephin, she clinically improved with intravenous fluids. Patient has dementia and is unable to provide history. Therefore HPI collected from her  Mr. Johnny Schwartz via telephone    88F w/ hx of dementia, pediatric meningitis c/b mild cognitive impairment, eclampsia/seizure disorder (last seizure Feb2020), eczema presents to [facility] for evaluation of generalized weakness. The patient reportedly has been having worsening gait instability and generalized weakness over last week. She was evaluated for typical check up with PCP 6d prior on 8/28/20 and on 9/1 they were informed she had UTI and was prescribed ciprofloxacin which was picked up but not started. She reportedly has felt warm but had no complaints to . On day of presentation, she was assisted to use the bathroom and then had difficulty getting off of the toilet and  had to lift her off. Once on her feet she appeared to be stumbling and generally weak and was lowered to the floor as she was about to fall backward. No reported head strike but patient could not get up from ground and therefore ems called. Of note patient has been working with PT for months for gait instability and last week she was found on kitchen floor by  but she could not remember what occurred. Last seizure Feb2020, and more recently has switched from divalproex back to Banzel 3d ago.  attributes divalproex and new memantine for her depression and therefore has stopped medications (memantine is still Rx by neurologist). Patient reports penicillin allergy as a child and  reports no known rash or anaphylaxis reaction.    In the ED, .5, 152/75, 120, 18 99%RA  s/p ceftriaxone 1gx1, 1L LR, acetaminophen 650  Patient was admitted and diagnosed sepsis secondary to  Ecoli uti and started on rocephin, she clinically improved with intravenous fluids.         Patient with urinary retention, Goodson re-inserted --trail of void and urology follow-up in [facility].

## 2020-09-16 NOTE — DISCHARGE NOTE PROVIDER - NSDCMRMEDTOKEN_GEN_ALL_CORE_FT
Banzel 400 mg oral tablet: 2 tab(s) orally 2 times a day  Dupixent 300 mg/2 mL subcutaneous solution:   Lucentis:   memantine 5 mg oral tablet: 1 tab(s) orally once a day   Banzel 400 mg oral tablet: 2 tab(s) orally 2 times a day  Dupixent 300 mg/2 mL subcutaneous solution:   memantine 5 mg oral tablet: 1 tab(s) orally once a day   Banzel 400 mg oral tablet: 2 tab(s) orally 2 times a day   rufinamide 400 mg oral tablet: 1 tab(s) orally 3 times a day ( 6am, 2pm and 10pm)

## 2020-09-17 LAB
ANION GAP SERPL CALC-SCNC: 11 MMOL/L — SIGNIFICANT CHANGE UP (ref 5–17)
BUN SERPL-MCNC: 26 MG/DL — HIGH (ref 7–23)
CALCIUM SERPL-MCNC: 9.9 MG/DL — SIGNIFICANT CHANGE UP (ref 8.4–10.5)
CHLORIDE SERPL-SCNC: 102 MMOL/L — SIGNIFICANT CHANGE UP (ref 96–108)
CO2 SERPL-SCNC: 25 MMOL/L — SIGNIFICANT CHANGE UP (ref 22–31)
CREAT SERPL-MCNC: 0.65 MG/DL — SIGNIFICANT CHANGE UP (ref 0.5–1.3)
GLUCOSE SERPL-MCNC: 106 MG/DL — HIGH (ref 70–99)
POTASSIUM SERPL-MCNC: 3.6 MMOL/L — SIGNIFICANT CHANGE UP (ref 3.5–5.3)
POTASSIUM SERPL-SCNC: 3.6 MMOL/L — SIGNIFICANT CHANGE UP (ref 3.5–5.3)
SARS-COV-2 RNA SPEC QL NAA+PROBE: SIGNIFICANT CHANGE UP
SODIUM SERPL-SCNC: 138 MMOL/L — SIGNIFICANT CHANGE UP (ref 135–145)

## 2020-09-17 RX ORDER — ACETAMINOPHEN 500 MG
650 TABLET ORAL ONCE
Refills: 0 | Status: COMPLETED | OUTPATIENT
Start: 2020-09-17 | End: 2020-09-17

## 2020-09-17 RX ADMIN — RUFINAMIDE 400 MILLIGRAM(S): 40 SUSPENSION ORAL at 05:34

## 2020-09-17 RX ADMIN — Medication 650 MILLIGRAM(S): at 11:18

## 2020-09-17 RX ADMIN — RUFINAMIDE 400 MILLIGRAM(S): 40 SUSPENSION ORAL at 13:18

## 2020-09-17 RX ADMIN — ENOXAPARIN SODIUM 40 MILLIGRAM(S): 100 INJECTION SUBCUTANEOUS at 11:18

## 2020-09-17 RX ADMIN — RUFINAMIDE 400 MILLIGRAM(S): 40 SUSPENSION ORAL at 21:58

## 2020-09-17 RX ADMIN — Medication 650 MILLIGRAM(S): at 11:48

## 2020-09-17 NOTE — PROGRESS NOTE ADULT - PROBLEM SELECTOR PLAN 3
- will check orthostatics today  - if (+) will consider midodrine as she had received a liter bolus on 9/13
- resolved.   - check orthostatics   - if (+) will consider midodrine as she had received a liter bolus on 9/13  - gentle IVF today
- will check orthostatics today  - if (+) will consider midodrine as she had received a liter bolus on 9/13
c/w home dosing of Banzel 400 mg q8hrs  - drug level collected in ED  - EEG order/pending, neurology eval appreciated.  - if EEG not done by the time rehab placement, can do it as outpt. Likely won't change the management since pt is back to baseline.
c/w home dosing of Banzel 400 mg q8hrs  - drug level collected in ED  - EEG pending, neurology eval appreciated.  - if EEG not done by the time rehab placement, can do it as outpt. Likely won't change the management since pt is back to baseline.
c/w home dosing of banzel  - drug level collected in ED
c/w home dosing of banzel  - drug level collected in ED  - EEG pending, neurology eval appreciated.
c/w home dosing of banzel 400 mg q8hrs  - drug level collected in ED  - EEG pending, neurology eval appreciated.
c/w home dosing of banzel 400 mg q8hrs  - drug level collected in ED  - EEG pending, neurology eval appreciated.
c/w home dosing of Banzel 400 mg q8hrs  - drug level collected in ED  - Neurology eval appreciated.  - if EEG not done by the time rehab placement, can do it as outpt. Likely won't change the management since pt is back to baseline.
None

## 2020-09-17 NOTE — PROGRESS NOTE ADULT - PROVIDER SPECIALTY LIST ADULT
Infectious Disease
Internal Medicine
Neurology
Infectious Disease
Internal Medicine

## 2020-09-17 NOTE — PROGRESS NOTE ADULT - PROBLEM SELECTOR PROBLEM 2
Meningitis
Urinary tract infection without hematuria, site unspecified
Urinary tract infection without hematuria, site unspecified
Meningitis
Urinary tract infection without hematuria, site unspecified

## 2020-09-17 NOTE — PROGRESS NOTE ADULT - PROBLEM SELECTOR PROBLEM 5
Dementia without behavioral disturbance, unspecified dementia type
Macrocytosis without anemia

## 2020-09-17 NOTE — PROGRESS NOTE ADULT - REASON FOR ADMISSION
88F presenting with generalized weakness

## 2020-09-17 NOTE — PROGRESS NOTE ADULT - SUBJECTIVE AND OBJECTIVE BOX
Neurology Progress    KANG [id number]    HPI:  Patient has dementia and is unable to provide history. Therefore HPI collected from her  Mr. 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 The patient reportedly has been having worsening gait instability and generalized weakness over last week. She was evaluated for typical check up with PCP 6d prior on 8/28/20 and on 9/1 they were informed she had UTI and was prescribed ciprofloxacin which was picked up but not started. She reportedly has felt warm but had no complaints to . On day of presentation, she was assisted to use the bathroom and then had difficulty getting off of the toilet and  had to lift her off. Once on her feet she appeared to be stumbling and generally weak and was lowered to the floor as she was about to fall backward. No reported head strike but patient could not get up from ground and therefore ems called. Of note patient has been working with PT for months for gait instability and last week she was found on kitchen floor by  but she could not remember what occurred. Last seizure Feb2020, and more recently has switched from divalproex back to Banzel 3d ago.  attributes divalproex and new memantine for her depression and therefore has stopped medications (memantine is still Rx by neurologist). Patient reports penicillin allergy as a child and  reports no known rash or anaphylaxis reaction.    In the ED, .5, 152/75, 120, 18 99%RA  s/p ceftriaxone 1gx1, 1L LR, acetaminophen 650 (03 Sep 2020 22:50)      Past Medical History  Dementia  Memory loss due to medical condition  Hypercholesterolemia  IBS (irritable bowel syndrome)  Seizure disorder  Meningitis      Past Surgical History  Cataract extraction status, right      MEDICATIONS    cefTRIAXone   IVPB 1000 milliGRAM(s) IV Intermittent every 24 hours  enoxaparin Injectable 40 milliGRAM(s) SubCutaneous daily  rufinamide 400 milliGRAM(s) Oral <User Schedule>  sodium chloride 0.9%. 1000 milliLiter(s) IV Continuous <Continuous>         Family history: No history of dementia, strokes, or seizures   FAMILY HISTORY:  No pertinent family history in first degree relatives    SOCIAL HISTORY -- No history of tobacco or alcohol use     Allergies     reports severe allergy to PEAS and NUTS (Hives)  Nuts (Hives)  penicillin (Unknown)    Intolerances            Vital Signs Last 24 Hrs  T(C): 36.3 (07 Sep 2020 09:13), Max: 37 (06 Sep 2020 13:14)  T(F): 97.4 (07 Sep 2020 09:13), Max: 98.6 (06 Sep 2020 13:14)  HR: 105 (07 Sep 2020 09:13) (105 - 116)  BP: 122/63 (07 Sep 2020 09:13) (105/66 - 128/80)  BP(mean): --  RR: 20 (07 Sep 2020 09:13) (18 - 20)  SpO2: 99% (07 Sep 2020 09:13) (96% - 100%)        On Neurological Examination:    Mental Status - Patient is alert, awake, oriented self hospital .   Follows commands well and able to answer questions appropriately. Mood and affect  normal  Follow simple commands able to repeat  able to name.  Speech - Fluent no Dysarthria  no  Aphasia                              Cranial Nerves - Extraocular muscle intact  ANGEL Facial symmetry Tongue midline, CnV1to V3 intact gross hearing intact      Motor Exam -   Right upper  5/5 throughout  Left upper 5/5 throughtout  Right lower- 5/5 throughout  Left lower 5/5 throughout  Coordination -finger to nose intact  Muscle tone - is normal all over. No asymmetry is seen.      Sensory    Bilateral intact to light touch stocking glove    GENERAL Exam:     Nontoxic , No Acute Distress   	  HEENT:  normocephalic, atraumatic  		  LUNGS:	Clear bilaterally  No Wheeze      VASCULAR: no carotid brui  	  HEART:	 Normal S1S2   No murmur RRR        	  MUSCULOSKELETAL: Normal Range of Motion  	   SKIN:      Normal   No Ecchymosis               LABS:  CBC Full  -  ( 07 Sep 2020 07:04 )  WBC Count : 7.27 K/uL  RBC Count : 4.27 M/uL  Hemoglobin : 11.8 g/dL  Hematocrit : 37.3 %  Platelet Count - Automated : 186 K/uL  Mean Cell Volume : 87.4 fl  Mean Cell Hemoglobin : 27.6 pg  Mean Cell Hemoglobin Concentration : 31.6 gm/dL  Auto Neutrophil # : x  Auto Lymphocyte # : x  Auto Monocyte # : x  Auto Eosinophil # : x  Auto Basophil # : x  Auto Neutrophil % : x  Auto Lymphocyte % : x  Auto Monocyte % : x  Auto Eosinophil % : x  Auto Basophil % : x      09-07    142  |  101  |  11  ----------------------------<  151<H>  4.1   |  27  |  0.60    Ca    8.8      07 Sep 2020 07:02  Mg     1.7     09-07      Hemoglobin A1C:       Vitamin B12         RADIOLOGY    EKG schoenberg 
Patient is a 88y old  Female who presents with a chief complaint of 88F presenting with generalized weakness (16 Sep 2020 18:12)      SUBJECTIVE / OVERNIGHT EVENTS:  feels well  awake alert  no cp, no sob, no n/v/d. no abdominal pain.  no headache, no dizziness.   no dysuria, no urinary urgency/frequency. no bowel/bladder incontinence.       Vital Signs Last 24 Hrs  T(C): 36.6 (17 Sep 2020 09:04), Max: 37.4 (16 Sep 2020 20:59)  T(F): 97.8 (17 Sep 2020 09:04), Max: 99.4 (16 Sep 2020 20:59)  HR: 111 (17 Sep 2020 09:04) (85 - 111)  BP: 109/67 (17 Sep 2020 09:04) (102/64 - 124/69)  BP(mean): --  RR: 18 (17 Sep 2020 09:04) (18 - 19)  SpO2: 98% (17 Sep 2020 09:04) (94% - 98%)  I&O's Summary    16 Sep 2020 07:01  -  17 Sep 2020 07:00  --------------------------------------------------------  IN: 470 mL / OUT: 1400 mL / NET: -930 mL    17 Sep 2020 07:01  -  17 Sep 2020 09:05  --------------------------------------------------------  IN: 200 mL / OUT: 150 mL / NET: 50 mL        PHYSICAL EXAM:  GENERAL: NAD, Comfortable, on room air  HEAD:  Atraumatic, Normocephalic  EYES: EOMI, PERRLA, conjunctiva and sclera clear  NECK: Supple, No JVD  CHEST/LUNG: mild decrease breath sounds bilaterally; No wheeze   HEART: Regular rate and rhythm; No murmurs, rubs, or gallops  ABDOMEN: Soft, Nontender, Nondistended; Bowel sounds present  Neuro: AAO x 2-3 (know her name, and location, somewhat the date appx), no focal deficit  EXTREMITIES:  2+ Peripheral Pulses, No clubbing, cyanosis, or edema  SKIN: No rashes or lesions    LABS:                        9.7    7.60  )-----------( 553      ( 17 Sep 2020 07:11 )             30.0     09-17    138  |  102  |  26<H>  ----------------------------<  106<H>  3.6   |  25  |  0.65    Ca    9.9      17 Sep 2020 07:11        CAPILLARY BLOOD GLUCOSE                RADIOLOGY & ADDITIONAL TESTS:    Imaging Personally Reviewed:  [x] YES  [ ] NO    Consultant(s) Notes Reviewed:  [x] YES  [ ] NO      MEDICATIONS  (STANDING):  enoxaparin Injectable 40 milliGRAM(s) SubCutaneous daily  rufinamide 400 milliGRAM(s) Oral <User Schedule>  sodium chloride 0.9%. 1000 milliLiter(s) (75 mL/Hr) IV Continuous <Continuous>    MEDICATIONS  (PRN):      Care Discussed with Consultants/Other Providers [x] YES  [ ] NO    
[facility] DIVISION of INFECTIOUS DISEASE  German Angeles MD PhD, Suzanne Zhang MD, Vannessa Pearson MD, Meir Felder MD  and providing coverage with Mary Varela MD and Eitan Sanchez MD  Providing Infectious Disease Consultations at [facility], [facility], [facility]  [phone]    infectious diseases progress note:    KANG VILLAFANA is a 88y y. o. Female patient    No concerning overnight events    Allergies     reports severe allergy to PEAS and NUTS (Hives)  Nuts (Hives)  penicillin (Unknown)    Intolerances        ANTIBIOTICS/RELEVANT:  antimicrobials  cefTRIAXone   IVPB 1000 milliGRAM(s) IV Intermittent every 24 hours    immunologic:    OTHER:  enoxaparin Injectable 40 milliGRAM(s) SubCutaneous daily  rufinamide 400 milliGRAM(s) Oral <User Schedule>  sodium chloride 0.9%. 1000 milliLiter(s) IV Continuous <Continuous>      Objective:  Vital Signs Last 24 Hrs  T(C): 36.3 (07 Sep 2020 09:13), Max: 37 (06 Sep 2020 13:14)  T(F): 97.4 (07 Sep 2020 09:13), Max: 98.6 (06 Sep 2020 13:14)  HR: 105 (07 Sep 2020 09:13) (105 - 116)  BP: 122/63 (07 Sep 2020 09:13) (105/66 - 128/80)  BP(mean): --  RR: 20 (07 Sep 2020 09:13) (18 - 20)  SpO2: 99% (07 Sep 2020 09:13) (96% - 100%)    T(C): 36.3 (09-07-20 @ 09:13), Max: 37.1 (09-06-20 @ 08:09)  T(C): 36.3 (09-07-20 @ 09:13), Max: 37.1 (09-04-20 @ 21:15)  T(C): 36.3 (09-07-20 @ 09:13), Max: 38.1 (09-03-20 @ 17:43)    PHYSICAL EXAM:  HEENT: NC atraumatic  Neck: supple  Respiratory: no accessory muscle use, breathing comfortably  Cardiovascular: distant  Gastrointestinal: normal appearing, nondistended  Extremities: no clubbing, no cyanosis,      LABS:                          11.8   7.27  )-----------( 186      ( 07 Sep 2020 07:04 )             37.3       7.27 09-07 @ 07:04  13.51 09-06 @ 07:14  12.77 09-05 @ 07:31  11.07 09-04 @ 09:17  12.36 09-03 @ 17:57      09-07    142  |  101  |  11  ----------------------------<  151<H>  4.1   |  27  |  0.60    Ca    8.8      07 Sep 2020 07:02  Mg     1.7     09-07        Creatinine, Serum: 0.60 mg/dL (09-07-20 @ 07:02)  Creatinine, Serum: 0.63 mg/dL (09-06-20 @ 07:14)  Creatinine, Serum: 0.53 mg/dL (09-04-20 @ 09:17)  Creatinine, Serum: 0.59 mg/dL (09-03-20 @ 17:57)                INFLAMMATORY MARKERS  Auto Neutrophil #: 8.37 K/uL (09-04-20 @ 09:17)  Auto Lymphocyte #: 1.23 K/uL (09-04-20 @ 09:17)  Auto Neutrophil #: 10.05 K/uL (09-03-20 @ 17:57)  Auto Lymphocyte #: 0.42 K/uL (09-03-20 @ 17:57)    Lactate, Blood: 1.4 mmol/L (09-04-20 @ 09:17)    Auto Eosinophil #: 0.03 K/uL (09-04-20 @ 09:17)  Auto Eosinophil #: 0.00 K/uL (09-03-20 @ 17:57)            Creatine Kinase, Serum: 44 U/L (09-03-20 @ 17:57)              Activated Partial Thromboplastin Time: 29.6 sec (09-03-20 @ 17:57)  INR: 1.12 ratio (09-03-20 @ 17:57)          MICROBIOLOGY:    Culture - Urine (09.04.20 @ 01:10)    -  Amikacin: S <=16    -  Amoxicillin/Clavulanic Acid: S <=8/4    -  Ampicillin: R >16 These ampicillin results predict results for amoxicillin    -  Ampicillin/Sulbactam: R >16/8 Enterobacter, Citrobacter, and Serratia may develop resistance during prolonged therapy (3-4 days)    -  Aztreonam: S <=4    -  Cefazolin: S <=2 (MIC_CL_COM_ENTERIC_CEFAZU) For uncomplicated UTI with K. pneumoniae, E. coli, or P. mirablis: KATHARINA <=16 is sensitive and KATHARINA >=32 is resistant. This also predicts results for oral agents cefaclor, cefdinir, cefpodoxime, cefprozil, cefuroxime axetil, cephalexin and locarbef for uncomplicated UTI. Note that some isolates may be susceptible to these agents while testing resistant to cefazolin.    -  Cefepime: S <=2    -  Cefoxitin: S <=8    -  Ceftriaxone: S <=1 Enterobacter, Citrobacter, and Serratia may develop resistance during prolonged therapy    -  Ciprofloxacin: S <=0.25    -  Ertapenem: S <=0.5    -  Gentamicin: S <=2    -  Imipenem: S <=1    -  Levofloxacin: S <=0.5    -  Meropenem: S <=1    -  Nitrofurantoin: S <=32 Should not be used to treat pyelonephritis    -  Piperacillin/Tazobactam: S <=8    -  Tigecycline: S <=2    -  Tobramycin: S <=2    -  Trimethoprim/Sulfamethoxazole: S <=0.5/9.5    Specimen Source: .Urine Clean Catch (Midstream)    Culture Results:   >100,000 CFU/ml Escherichia coli    Organism Identification: Escherichia coli    Organism: Escherichia coli    Method Type: KATHARINA        RADIOLOGY & ADDITIONAL STUDIES:
[facility], Division of Infectious Diseases  STALIN Mcneal Y. Patel, S. Shah  [phone]    Name: KANG VILLAFANA  Age: 88y  Gender: Female  MRN: [id number]    Interval History--  Notes reviewed  sitting in chair  no overnight events    Past Medical History--  Dementia  Memory loss due to medical condition  Hypercholesterolemia  IBS (irritable bowel syndrome)  Seizure disorder  Meningitis  Cataract extraction status, right      For details regarding the patient's social history, family history, and other miscellaneous elements, please refer the initial infectious diseases consultation and/or the admitting history and physical examination for this admission.    Allergies     reports severe allergy to PEAS and NUTS (Hives)  Nuts (Hives)  penicillin (Unknown)    Intolerances        Medications--  Antibiotics:  cefTRIAXone   IVPB 1000 milliGRAM(s) IV Intermittent every 24 hours    Immunologic:    Other:  enoxaparin Injectable  metoprolol tartrate  rufinamide  sodium chloride 0.9%.      Review of Systems--  A 10-point review of systems was obtained.   coginitive dysfucntion    Review of systems otherwise negative except as previously noted.    Physical Examination--  Vital Signs: T(F): 97.7 (09-08-20 @ 13:04), Max: 99.3 (09-07-20 @ 21:06)  HR: 106 (09-08-20 @ 13:04)  BP: 126/84 (09-08-20 @ 13:04)  RR: 18 (09-08-20 @ 13:04)  SpO2: 97% (09-08-20 @ 13:04)  Wt(kg): --  General: Nontoxic-appearing Female in no acute distress.  HEENT: AT/NC. Anicteric. Conjunctiva pink and moist. O  Neck: Not rigid. No sense of mass.  Nodes: None palpable.  Lungs: Clear bilaterally without rales, wheezing or rhonchi  Heart:tachy s1s2  Abdomen: Bowel sounds present and normoactive. Soft. Nondistended. Nontender..  Back: No spinal tenderness. No costovertebral angle tenderness.   Extremities: No cyanosis or clubbing. No edema.   Skin: Warm. Dry. Good turgor. No rash. No vasculitic stigmata.  Psychiatric: pleasantly confused         Laboratory Studies--  CBC                        12.0   11.05 )-----------( 263      ( 08 Sep 2020 07:34 )             35.7       Chemistries  09-08    137  |  99  |  22  ----------------------------<  120<H>  3.8   |  29  |  0.49<L>    Ca    9.8      08 Sep 2020 07:38  Mg     1.7     09-07        Culture Data    Culture - Urine (collected 04 Sep 2020 01:10)  Source: .Urine Clean Catch (Midstream)  Final Report (06 Sep 2020 10:27):    >100,000 CFU/ml Escherichia coli  Organism: Escherichia coli (06 Sep 2020 10:27)  Organism: Escherichia coli (06 Sep 2020 10:27)    Culture - Blood (collected 04 Sep 2020 00:37)  Source: .Blood Blood-Peripheral  Preliminary Report (05 Sep 2020 01:02):    No growth to date.    Culture - Blood (collected 04 Sep 2020 00:37)  Source: .Blood Blood-Peripheral  Preliminary Report (05 Sep 2020 01:02):    No growth to date.      Culture - Urine (09.04.20 @ 01:10)    -  Amikacin: S <=16    -  Amoxicillin/Clavulanic Acid: S <=8/4    -  Ampicillin: R >16 These ampicillin results predict results for amoxicillin    -  Ampicillin/Sulbactam: R >16/8 Enterobacter, Citrobacter, and Serratia may develop resistance during prolonged therapy (3-4 days)    -  Aztreonam: S <=4    -  Cefazolin: S <=2 (MIC_CL_COM_ENTERIC_CEFAZU) For uncomplicated UTI with K. pneumoniae, E. coli, or P. mirablis: KATHARINA <=16 is sensitive and KATHARINA >=32 is resistant. This also predicts results for oral agents cefaclor, cefdinir, cefpodoxime, cefprozil, cefuroxime axetil, cephalexin and locarbef for uncomplicated UTI. Note that some isolates may be susceptible to these agents while testing resistant to cefazolin.    -  Cefepime: S <=2    -  Cefoxitin: S <=8    -  Ceftriaxone: S <=1 Enterobacter, Citrobacter, and Serratia may develop resistance during prolonged therapy    -  Ciprofloxacin: S <=0.25    -  Ertapenem: S <=0.5    -  Gentamicin: S <=2    -  Imipenem: S <=1    -  Levofloxacin: S <=0.5    -  Meropenem: S <=1    -  Nitrofurantoin: S <=32 Should not be used to treat pyelonephritis    -  Piperacillin/Tazobactam: S <=8    -  Tigecycline: S <=2    -  Tobramycin: S <=2    -  Trimethoprim/Sulfamethoxazole: S <=0.5/9.5    Specimen Source: .Urine Clean Catch (Midstream)    Culture Results:   >100,000 CFU/ml Escherichia coli    Organism Identification: Escherichia coli    Organism: Escherichia coli    Method Type: KATHARINA
NP 6 HR Sepsis Note     TO BE COMPLETED WITHIN 6 HOURS OF INITIAL ASSESSMENT:    For use in patients that have 2 sepsis criteria and new organ dysfunction   •	Tachycardia - HR 120s - 130s.   •	Lactate >2  - lactate 3.3 - pending repeat results     If patient persistent hypotension (SBP<90) or any lactate >4 then provider evaluation (Physician/PA/NP) within 30 minutes of bolus completion is required.  MEDICATIONS  (STANDING):    This 88 F w/ hx of dementia, pediatric meningitis c/b mild cognitive impairment, eclampsia/seizure disorder (last seizure ), eczema presents to [facility] for evaluation of generalized weakness found to have sepsis 2/2 UTI      cefTRIAXone   IVPB 1000 milliGRAM(s) IV Intermittent every 24 hours  enoxaparin Injectable 40 milliGRAM(s) SubCutaneous daily  rufinamide 400 milliGRAM(s) Oral <User Schedule>  sodium chloride 0.9%. 1000 milliLiter(s) (50 mL/Hr) IV Continuous <Continuous>      Vital Signs Last 24 Hrs  T(C): 36.6 (13 Sep 2020 22:00), Max: 37.2 (13 Sep 2020 17:55)  T(F): 97.9 (13 Sep 2020 22:00), Max: 98.9 (13 Sep 2020 17:55)  HR: 115 (13 Sep 2020 22:00) (104 - 128)  BP: 140/73 (13 Sep 2020 22:00) (86/62 - 140/73)  BP(mean): --  RR: 23 (13 Sep 2020 22:00) (18 - 26)  SpO2: 99% (13 Sep 2020 22:00) (95% - 99%)    ________________________________________________  PHYSICAL EXAM:  GENERAL: NAD  HEENT: Normocephalic;  conjunctivae and sclerae clear; moist mucous membranes;   NECK : supple  CHEST/LUNG: Clear to auscultation bilaterally with good air entry   HEART: S1 S2  tachy; no murmurs, gallops or rubs  ABDOMEN: Soft, Nontender, Nondistended; Bowel sounds present  EXTREMITIES: no cyanosis; no edema; no calf tenderness  SKIN: warm and dry; no rash  NERVOUS SYSTEM:  Awake and alert    _________________________________________________  LABS:                        11.6   11.87 )-----------( 465      ( 13 Sep 2020 18:23 )             35.6         138  |  98  |  26<H>  ----------------------------<  111<H>  4.0   |  26  |  0.65    Ca    10.6<H>      13 Sep 2020 18:23    TPro  6.8  /  Alb  2.8<L>  /  TBili  0.4  /  DBili  x   /  AST  34  /  ALT  65<H>  /  AlkPhos  164<H>        Urinalysis Basic - ( 13 Sep 2020 18:23 )    Color: Light Orange / Appearance: Turbid / S.015 / pH: x  Gluc: x / Ketone: Negative  / Bili: Negative / Urobili: 2 mg/dL   Blood: x / Protein: 30 mg/dL / Nitrite: Negative   Leuk Esterase: Small / RBC: 3 /hpf / WBC 11 /HPF   Sq Epi: x / Non Sq Epi: 20 /hpf / Bacteria: Negative      CAPILLARY BLOOD GLUCOSE      Assessment and Plan    #Tachycardia  1. Patient seen and examined, denies all complaints   2. Pt with Heart rate 100-110's on tele, /73  3. Sepsis panel ordered  4. Pt currently on Ceftriaxone for UTI  / Infectious disease following   5. Will cont to monitor .  6. Pending results for repeat lactate     Ama Saenz, ANP-BC  Department of Medicine  [id number]    
Neurology Progress    KANG [id number]    HPI:  Patient has dementia and is unable to provide history. Therefore HPI collected from her  Mr. Johnny Schwartz via telephone    88F w/ hx of dementia, pediatric meningitis c/b mild cognitive impairment, eclampsia/seizure disorder (last seizure Feb2020), eczema presents to [facility] for evaluation of generalized weakness. The patient reportedly has been having worsening gait instability and generalized weakness over last week. She was evaluated for typical check up with PCP 6d prior on 8/28/20 and on 9/1 they were informed she had UTI and was prescribed ciprofloxacin which was picked up but not started. She reportedly has felt warm but had no complaints to . On day of presentation, she was assisted to use the bathroom and then had difficulty getting off of the toilet and  had to lift her off. Once on her feet she appeared to be stumbling and generally weak and was lowered to the floor as she was about to fall backward. No reported head strike but patient could not get up from ground and therefore ems called. Of note patient has been working with PT for months for gait instability and last week she was found on kitchen floor by  but she could not remember what occurred. Last seizure Feb2020, and more recently has switched from divalproex back to Banzel 3d ago.  attributes divalproex and new memantine for her depression and therefore has stopped medications (memantine is still Rx by neurologist). Patient reports penicillin allergy as a child and  reports no known rash or anaphylaxis reaction.    In the ED, .5, 152/75, 120, 18 99%RA  s/p ceftriaxone 1gx1, 1L LR, acetaminophen 650 (03 Sep 2020 22:50)      Past Medical History  Dementia  Memory loss due to medical condition  Hypercholesterolemia  IBS (irritable bowel syndrome)  Seizure disorder  Meningitis      Past Surgical History  Cataract extraction status, right      MEDICATIONS    enoxaparin Injectable 40 milliGRAM(s) SubCutaneous daily  rufinamide 400 milliGRAM(s) Oral <User Schedule>  sodium chloride 0.9%. 1000 milliLiter(s) IV Continuous <Continuous>         Family history: No history of dementia, strokes, or seizures   FAMILY HISTORY:  No pertinent family history in first degree relatives    SOCIAL HISTORY -- No history of tobacco or alcohol use     Allergies     reports severe allergy to PEAS and NUTS (Hives)  Nuts (Hives)  penicillin (Unknown)    Intolerances            Vital Signs Last 24 Hrs  T(C): 36.5 (11 Sep 2020 05:05), Max: 36.6 (11 Sep 2020 00:45)  T(F): 97.7 (11 Sep 2020 05:05), Max: 97.8 (11 Sep 2020 00:45)  HR: 109 (11 Sep 2020 05:05) (100 - 109)  BP: 128/70 (11 Sep 2020 05:05) (106/68 - 133/80)  BP(mean): --  RR: 18 (11 Sep 2020 05:05) (17 - 18)  SpO2: 97% (11 Sep 2020 05:05) (97% - 98%)        On Neurological Examination:    Mental Status - Patient is alert, awake, oriented X2. .   Follows commands well and able to answer questions appropriately. Mood and affect  normal  Follow simple commands able to repeat  able to name.  Speech - Fluent no Dysarthria  no  Aphasia                              Cranial Nerves - Extraocular muscle intact  ANGEL Facial symmetry Tongue midline, CnV1to V3 intact gross hearing intact      Motor Exam -   Right upper  5/5 throughout  Left upper 5/5 throughtout  Right lower- 5/5 throughout  Left lower 5/5 throughout  Coordination -finger to nose intact  Muscle tone - is normal all over. No asymmetry is seen.      Sensory    Bilateral intact to light touch    GENERAL Exam:     Nontoxic , No Acute Distress   	  HEENT:  normocephalic, atraumatic  		  LUNGS:	Clear bilaterally  No Wheeze      VASCULAR: no carotid brui  	  HEART:	 Normal S1S2   No murmur RRR        	  MUSCULOSKELETAL: Normal Range of Motion  	   SKIN:      Normal   No Ecchymosis               LABS:  CBC Full  -  ( 11 Sep 2020 04:56 )  WBC Count : 8.53 K/uL  RBC Count : 3.48 M/uL  Hemoglobin : 11.2 g/dL  Hematocrit : 34.5 %  Platelet Count - Automated : 311 K/uL  Mean Cell Volume : 99.1 fl  Mean Cell Hemoglobin : 32.2 pg  Mean Cell Hemoglobin Concentration : 32.5 gm/dL  Auto Neutrophil # : x  Auto Lymphocyte # : x  Auto Monocyte # : x  Auto Eosinophil # : x  Auto Basophil # : x  Auto Neutrophil % : x  Auto Lymphocyte % : x  Auto Monocyte % : x  Auto Eosinophil % : x  Auto Basophil % : x      09-11    138  |  100  |  23  ----------------------------<  111<H>  4.1   |  29  |  0.53    Ca    9.9      11 Sep 2020 04:56      Hemoglobin A1C:       Vitamin B12         RADIOLOGY    EKG      IMPRESSION  This is a  year old           schoenberg 
Neurology Progress Note      the patient's symptoms  --- are  unchanged    MEDICATIONS  (STANDING):  cefTRIAXone   IVPB 1000 milliGRAM(s) IV Intermittent every 24 hours  enoxaparin Injectable 40 milliGRAM(s) SubCutaneous daily  rufinamide 400 milliGRAM(s) Oral <User Schedule>    MEDICATIONS  (PRN):              Vital Signs Last 24 Hrs  T(C): 36.6 (05 Sep 2020 08:34), Max: 37.1 (04 Sep 2020 21:15)  T(F): 97.9 (05 Sep 2020 08:34), Max: 98.8 (04 Sep 2020 21:15)  HR: 98 (05 Sep 2020 08:34) (59 - 103)  BP: 107/69 (05 Sep 2020 08:34) (103/54 - 144/68)  BP(mean): --  RR: 18 (05 Sep 2020 08:34) (16 - 18)  SpO2: 95% (05 Sep 2020 08:34) (93% - 100%)    Physical exam  MENTAL STATUS- Alert, attends, fluent, non-dysarthric, follows commands, dis oriented, poor memory   CRANIAL NERVES-II Optic - Bilateral - inconsist Visual Fields. III-IV-VI EOMI & Pupils - Bilateral - Normal Bilaterally. V Trigeminal - Bilateral - Normal bilateral facial sensation and jaw movement. VII Facial - Normal bilateral facial movement. VIII Acoustic - Bilateral - Hearing normal to voice bilaterally. IX Glossopharyngeal / X Vagus - Normal palate elevates symmetrically. XI Accessory - Normal Bilaterally. XII Hypoglossal - Tongue protrudes midline and moves symmetrically.  MOTOR-Bulk and Contour - Normal. Tone - Normal tone, no abnormal movements. Strength - 5/5 normal muscle strength - Strength full throughout.  SENSORY-Normal - LT, DSS   REFLEXES- DTR's 1+ throughout.  COORDINATION-Normal  FNF - bilaterally.  GAIT-NA    CBC Full  -  ( 05 Sep 2020 07:31 )  WBC Count : 12.77 K/uL  RBC Count : 3.71 M/uL  Hemoglobin : 12.0 g/dL  Hematocrit : 36.6 %  Platelet Count - Automated : 221 K/uL  Mean Cell Volume : 98.7 fl  Mean Cell Hemoglobin : 32.3 pg  Mean Cell Hemoglobin Concentration : 32.8 gm/dL  Auto Neutrophil # : x  Auto Lymphocyte # : x  Auto Monocyte # : x  Auto Eosinophil # : x  Auto Basophil # : x  Auto Neutrophil % : x  Auto Lymphocyte % : x  Auto Monocyte % : x  Auto Eosinophil % : x  Auto Basophil % : x      09-04    137  |  99  |  15  ----------------------------<  99  3.9   |  28  |  0.53    Ca    9.4      04 Sep 2020 09:17  Phos  2.8     09-04  Mg     1.6     09-04    TPro  7.3  /  Alb  3.4  /  TBili  0.3  /  DBili  x   /  AST  28  /  ALT  11  /  AlkPhos  81  09-03    LIVER FUNCTIONS - ( 03 Sep 2020 17:57 )  Alb: 3.4 g/dL / Pro: 7.3 g/dL / ALK PHOS: 81 U/L / ALT: 11 U/L / AST: 28 U/L / GGT: x
Patient is a 88y old  Female who presents with a chief complaint of 88F presenting with generalized weakness (04 Sep 2020 07:04)      SUBJECTIVE / OVERNIGHT EVENTS:  feels well  awake alert  pleasantly confused  she is not sure where she is  history is limited.  denied pain  no dysuria, no urinary urgency/frequency. no bowel/bladder incontinence.       Vital Signs Last 24 Hrs  T(C): 36.7 (04 Sep 2020 09:00), Max: 38.1 (03 Sep 2020 17:43)  T(F): 98 (04 Sep 2020 09:00), Max: 100.5 (03 Sep 2020 17:43)  HR: 97 (04 Sep 2020 09:00) (68 - 120)  BP: 129/71 (04 Sep 2020 09:00) (127/82 - 152/75)  BP(mean): --  RR: 18 (04 Sep 2020 09:00) (18 - 18)  SpO2: 98% (04 Sep 2020 09:00) (98% - 99%)  I&O's Summary    03 Sep 2020 07:01  -  04 Sep 2020 07:00  --------------------------------------------------------  IN: 0 mL / OUT: 350 mL / NET: -350 mL    04 Sep 2020 07:01  -  04 Sep 2020 12:11  --------------------------------------------------------  IN: 240 mL / OUT: 250 mL / NET: -10 mL        PHYSICAL EXAM:  GENERAL: NAD, Comfortable, on room air, thin elderly  HEAD:  Atraumatic, Normocephalic  EYES: EOMI, PERRLA, conjunctiva and sclera clear  NECK: Supple, No JVD  CHEST/LUNG: mild decrease breath sounds bilaterally; No wheeze   HEART: Regular rate and rhythm; No murmurs, rubs, or gallops  ABDOMEN: Soft, Nontender, Nondistended; Bowel sounds present  Neuro: AAO x 1 (know her name, but not the date and location), no focal deficit  EXTREMITIES:  2+ Peripheral Pulses, No clubbing, cyanosis, or edema  SKIN: No rashes or lesions      LABS:                        12.7   11.07 )-----------( 207      ( 04 Sep 2020 09:17 )             38.0     09-04    137  |  99  |  15  ----------------------------<  99  3.9   |  28  |  0.53    Ca    9.4      04 Sep 2020 09:17  Phos  2.8     09-04  Mg     1.6     09-04    TPro  7.3  /  Alb  3.4  /  TBili  0.3  /  DBili  x   /  AST  28  /  ALT  11  /  AlkPhos  81  09-03    PT/INR - ( 03 Sep 2020 17:57 )   PT: 13.2 sec;   INR: 1.12 ratio         PTT - ( 03 Sep 2020 17:57 )  PTT:29.6 sec  CAPILLARY BLOOD GLUCOSE        CARDIAC MARKERS ( 03 Sep 2020 17:57 )  x     / x     / 44 U/L / x     / x          Urinalysis Basic - ( 03 Sep 2020 17:57 )    Color: Yellow / Appearance: Slightly Turbid / S.020 / pH: x  Gluc: x / Ketone: Negative  / Bili: Negative / Urobili: Negative   Blood: x / Protein: Trace / Nitrite: Positive   Leuk Esterase: Large / RBC: 6 /hpf / WBC 41 /HPF   Sq Epi: x / Non Sq Epi: 0 /hpf / Bacteria: Many        RADIOLOGY & ADDITIONAL TESTS:    Imaging Personally Reviewed:  [x] YES  [ ] NO    Consultant(s) Notes Reviewed:  [x] YES  [ ] NO      MEDICATIONS  (STANDING):  cefTRIAXone   IVPB 1000 milliGRAM(s) IV Intermittent every 24 hours  enoxaparin Injectable 40 milliGRAM(s) SubCutaneous daily  rufinamide 800 milliGRAM(s) Oral two times a day    MEDICATIONS  (PRN):      Care Discussed with Consultants/Other Providers [x] YES  [ ] NO    HEALTH ISSUES - PROBLEM Dx:  Prophylactic measure: Prophylactic measure  Macrocytosis without anemia: Macrocytosis without anemia  Dementia without behavioral disturbance, unspecified dementia type: Dementia without behavioral disturbance, unspecified dementia type  Seizure disorder: Seizure disorder  Urinary tract infection without hematuria, site unspecified: Urinary tract infection without hematuria, site unspecified  Sepsis, due to unspecified organism, unspecified whether acute organ dysfunction present: Sepsis, due to unspecified organism, unspecified whether acute organ dysfunction present
Patient is a 88y old  Female who presents with a chief complaint of 88F presenting with generalized weakness (05 Sep 2020 12:47)      SUBJECTIVE / OVERNIGHT EVENTS:  no events  pending EEG  no cp, no sob, no n/v/d. no abdominal pain.  no headache, no dizziness.         Vital Signs Last 24 Hrs  T(C): 37 (05 Sep 2020 17:19), Max: 37.1 (05 Sep 2020 04:51)  T(F): 98.6 (05 Sep 2020 17:19), Max: 98.7 (05 Sep 2020 04:51)  HR: 102 (05 Sep 2020 17:19) (97 - 103)  BP: 126/66 (05 Sep 2020 17:19) (103/54 - 144/68)  BP(mean): --  RR: 18 (05 Sep 2020 17:19) (18 - 18)  SpO2: 100% (05 Sep 2020 17:19) (95% - 100%)  I&O's Summary    04 Sep 2020 07:01  -  05 Sep 2020 07:00  --------------------------------------------------------  IN: 870 mL / OUT: 1300 mL / NET: -430 mL    05 Sep 2020 07:01  -  05 Sep 2020 21:56  --------------------------------------------------------  IN: 480 mL / OUT: 100 mL / NET: 380 mL      PHYSICAL EXAM:  GENERAL: NAD, Comfortable, on room air, thin elderly  HEAD:  Atraumatic, Normocephalic  EYES: EOMI, PERRLA, conjunctiva and sclera clear  NECK: Supple, No JVD  CHEST/LUNG: mild decrease breath sounds bilaterally; No wheeze   HEART: Regular rate and rhythm; No murmurs, rubs, or gallops  ABDOMEN: Soft, Nontender, Nondistended; Bowel sounds present  Neuro: AAO x 1 (know her name, but not the date and location), no focal deficit  EXTREMITIES:  2+ Peripheral Pulses, No clubbing, cyanosis, or edema  SKIN: No rashes or lesions      LABS:                        12.0   12.77 )-----------( 221      ( 05 Sep 2020 07:31 )             36.6     09-04    137  |  99  |  15  ----------------------------<  99  3.9   |  28  |  0.53    Ca    9.4      04 Sep 2020 09:17  Phos  2.8     09-04  Mg     1.6     09-04        CAPILLARY BLOOD GLUCOSE                RADIOLOGY & ADDITIONAL TESTS:    Imaging Personally Reviewed:  [x] YES  [ ] NO    Consultant(s) Notes Reviewed:  [x] YES  [ ] NO      MEDICATIONS  (STANDING):  cefTRIAXone   IVPB 1000 milliGRAM(s) IV Intermittent every 24 hours  enoxaparin Injectable 40 milliGRAM(s) SubCutaneous daily  rufinamide 400 milliGRAM(s) Oral <User Schedule>    MEDICATIONS  (PRN):      Care Discussed with Consultants/Other Providers [x] YES  [ ] NO    HEALTH ISSUES - PROBLEM Dx:  Meningitis: Meningitis  Pyelonephritis: Pyelonephritis  UTI (urinary tract infection): UTI (urinary tract infection)  Prophylactic measure: Prophylactic measure  Macrocytosis without anemia: Macrocytosis without anemia  Dementia without behavioral disturbance, unspecified dementia type: Dementia without behavioral disturbance, unspecified dementia type  Seizure disorder: Seizure disorder  Urinary tract infection without hematuria, site unspecified: Urinary tract infection without hematuria, site unspecified  Sepsis, due to unspecified organism, unspecified whether acute organ dysfunction present: Sepsis, due to unspecified organism, unspecified whether acute organ dysfunction present
Patient is a 88y old  Female who presents with a chief complaint of 88F presenting with generalized weakness (05 Sep 2020 15:56)      SUBJECTIVE / OVERNIGHT EVENTS:  feeling better  sad to be in the hospital  the  at bedside.  denied pain  comfortable  Per the , Banzel was switched to Depakote due to cost reason.   Later switched back to Banzel due to mood issues with Dapakote.       Vital Signs Last 24 Hrs  T(C): 36.7 (06 Sep 2020 17:20), Max: 37.1 (06 Sep 2020 08:09)  T(F): 98 (06 Sep 2020 17:20), Max: 98.8 (06 Sep 2020 08:09)  HR: 112 (06 Sep 2020 17:20) (101 - 112)  BP: 128/80 (06 Sep 2020 17:20) (95/62 - 128/80)  BP(mean): --  RR: 18 (06 Sep 2020 17:20) (18 - 18)  SpO2: 98% (06 Sep 2020 17:20) (96% - 100%)  I&O's Summary    05 Sep 2020 07:01  -  06 Sep 2020 07:00  --------------------------------------------------------  IN: 530 mL / OUT: 300 mL / NET: 230 mL    06 Sep 2020 07:01  -  06 Sep 2020 18:24  --------------------------------------------------------  IN: 360 mL / OUT: 150 mL / NET: 210 mL        PHYSICAL EXAM:  GENERAL: NAD, Comfortable, on room air, thin elderly  HEAD:  Atraumatic, Normocephalic  EYES: EOMI, PERRLA, conjunctiva and sclera clear  NECK: Supple, No JVD  CHEST/LUNG: mild decrease breath sounds bilaterally; No wheeze   HEART: Regular rate and rhythm; No murmurs, rubs, or gallops  ABDOMEN: Soft, Nontender, Nondistended; Bowel sounds present  Neuro: AAO x 1 (know her name, but not the date and location), no focal deficit  EXTREMITIES:  2+ Peripheral Pulses, No clubbing, cyanosis, or edema  SKIN: No rashes or lesions    LABS:                        12.2   13.51 )-----------( 237      ( 06 Sep 2020 07:14 )             37.0     09-06    135  |  96  |  19  ----------------------------<  128<H>  3.9   |  28  |  0.63    Ca    10.1      06 Sep 2020 07:14  Mg     2.0     09-06        CAPILLARY BLOOD GLUCOSE                RADIOLOGY & ADDITIONAL TESTS:    Imaging Personally Reviewed:  [x] YES  [ ] NO    Consultant(s) Notes Reviewed:  [x] YES  [ ] NO      MEDICATIONS  (STANDING):  cefTRIAXone   IVPB 1000 milliGRAM(s) IV Intermittent every 24 hours  enoxaparin Injectable 40 milliGRAM(s) SubCutaneous daily  rufinamide 400 milliGRAM(s) Oral <User Schedule>    MEDICATIONS  (PRN):      Care Discussed with Consultants/Other Providers [x] YES  [ ] NO    HEALTH ISSUES - PROBLEM Dx:  Meningitis: Meningitis  Pyelonephritis: Pyelonephritis  UTI (urinary tract infection): UTI (urinary tract infection)  Prophylactic measure: Prophylactic measure  Macrocytosis without anemia: Macrocytosis without anemia  Dementia without behavioral disturbance, unspecified dementia type: Dementia without behavioral disturbance, unspecified dementia type  Seizure disorder: Seizure disorder  Urinary tract infection without hematuria, site unspecified: Urinary tract infection without hematuria, site unspecified  Sepsis, due to unspecified organism, unspecified whether acute organ dysfunction present: Sepsis, due to unspecified organism, unspecified whether acute organ dysfunction present
Patient is a 88y old  Female who presents with a chief complaint of 88F presenting with generalized weakness (07 Sep 2020 12:52)      SUBJECTIVE / OVERNIGHT EVENTS:  No overnight events.  No complaints.  No cp, sob, abdominal pain.  No n/v/d. no HA/dizziness.   still slightly tachycardic even after IVF  start low dose metoprolol  clinically euvolemic       Vital Signs Last 24 Hrs  T(C): 37.1 (07 Sep 2020 17:18), Max: 37.1 (07 Sep 2020 17:18)  T(F): 98.7 (07 Sep 2020 17:18), Max: 98.7 (07 Sep 2020 17:18)  HR: 108 (07 Sep 2020 17:18) (105 - 116)  BP: 143/67 (07 Sep 2020 17:18) (107/70 - 143/67)  BP(mean): --  RR: 18 (07 Sep 2020 17:18) (18 - 20)  SpO2: 96% (07 Sep 2020 17:18) (95% - 99%)  I&O's Summary    06 Sep 2020 07:01  -  07 Sep 2020 07:00  --------------------------------------------------------  IN: 920 mL / OUT: 300 mL / NET: 620 mL    07 Sep 2020 07:01  -  07 Sep 2020 19:39  --------------------------------------------------------  IN: 220 mL / OUT: 500 mL / NET: -280 mL      PHYSICAL EXAM:  GENERAL: NAD, Comfortable, on room air, thin elderly  HEAD:  Atraumatic, Normocephalic  EYES: EOMI, PERRLA, conjunctiva and sclera clear  NECK: Supple, No JVD  CHEST/LUNG: mild decrease breath sounds bilaterally; No wheeze   HEART: Regular rate and rhythm; No murmurs, rubs, or gallops  ABDOMEN: Soft, Nontender, Nondistended; Bowel sounds present  Neuro: AAO x 1 (know her name, but not the date and location), no focal deficit  EXTREMITIES:  2+ Peripheral Pulses, No clubbing, cyanosis, or edema  SKIN: No rashes or lesions    LABS:                        11.8   7.27  )-----------( 186      ( 07 Sep 2020 07:04 )             37.3     09-07    142  |  101  |  11  ----------------------------<  151<H>  4.1   |  27  |  0.60    Ca    8.8      07 Sep 2020 07:02  Mg     1.7     09-07        CAPILLARY BLOOD GLUCOSE                RADIOLOGY & ADDITIONAL TESTS:    Imaging Personally Reviewed:  [x] YES  [ ] NO    Consultant(s) Notes Reviewed:  [x] YES  [ ] NO      MEDICATIONS  (STANDING):  cefTRIAXone   IVPB 1000 milliGRAM(s) IV Intermittent every 24 hours  enoxaparin Injectable 40 milliGRAM(s) SubCutaneous daily  metoprolol tartrate 12.5 milliGRAM(s) Oral two times a day  rufinamide 400 milliGRAM(s) Oral <User Schedule>  sodium chloride 0.9%. 1000 milliLiter(s) (50 mL/Hr) IV Continuous <Continuous>    MEDICATIONS  (PRN):      Care Discussed with Consultants/Other Providers [x] YES  [ ] NO    HEALTH ISSUES - PROBLEM Dx:  Meningitis: Meningitis  Pyelonephritis: Pyelonephritis  UTI (urinary tract infection): UTI (urinary tract infection)  Prophylactic measure: Prophylactic measure  Macrocytosis without anemia: Macrocytosis without anemia  Dementia without behavioral disturbance, unspecified dementia type: Dementia without behavioral disturbance, unspecified dementia type  Seizure disorder: Seizure disorder  Urinary tract infection without hematuria, site unspecified: Urinary tract infection without hematuria, site unspecified  Sepsis, due to unspecified organism, unspecified whether acute organ dysfunction present: Sepsis, due to unspecified organism, unspecified whether acute organ dysfunction present
Patient is a 88y old  Female who presents with a chief complaint of 88F presenting with generalized weakness (07 Sep 2020 19:36)      SUBJECTIVE / OVERNIGHT EVENTS:  Breathing is comfortable  no chest pain overnight    Vital Signs Last 24 Hrs  T(C): 36.7 (11 Sep 2020 21:33), Max: 36.7 (11 Sep 2020 13:40)  T(F): 98 (11 Sep 2020 21:33), Max: 98.1 (11 Sep 2020 13:40)  HR: 103 (11 Sep 2020 21:33) (100 - 115)  BP: 113/65 (11 Sep 2020 21:33) (110/66 - 139/70)  BP(mean): --  RR: 18 (11 Sep 2020 21:33) (18 - 18)  SpO2: 97% (11 Sep 2020 21:33) (96% - 98%)    I&O's Summary    09-11-20 @ 07:01  -  09-12-20 @ 07:00  --------------------------------------------------------  IN: 380 mL / OUT: 1300 mL / NET: -920 mL        PHYSICAL EXAM:  GENERAL: NAD, Comfortable, on room air  HEAD:  Atraumatic, Normocephalic  EYES: EOMI, PERRLA, conjunctiva and sclera clear  NECK: Supple, No JVD  CHEST/LUNG: mild decrease breath sounds bilaterally; No wheeze   HEART: Regular rate and rhythm; No murmurs, rubs, or gallops  ABDOMEN: Soft, Nontender, Nondistended; Bowel sounds present  Neuro: AAO x 2-3 (know her name, and location, somewhat the date appx), no focal deficit  EXTREMITIES:  2+ Peripheral Pulses, No clubbing, cyanosis, or edema  SKIN: No rashes or lesions    LABS:                        11.1   10.41 )-----------( 385      ( 12 Sep 2020 07:13 )             33.4     09-12    140  |  101  |  22  ----------------------------<  127<H>  3.7   |  27  |  0.55    Ca    10.1      12 Sep 2020 07:13        CAPILLARY BLOOD GLUCOSE                RADIOLOGY & ADDITIONAL TESTS:    Imaging Personally Reviewed:  [x] YES  [ ] NO    Consultant(s) Notes Reviewed:  [x] YES  [ ] NO                    
Patient is a 88y old  Female who presents with a chief complaint of 88F presenting with generalized weakness (07 Sep 2020 19:36)      SUBJECTIVE / OVERNIGHT EVENTS:  NO diarrhea or N/V  NO fevers last night  Saturating well on RA    Vital Signs Last 24 Hrs  T(C): 36.8 (13 Sep 2020 09:00), Max: 37.2 (12 Sep 2020 21:44)  T(F): 98.2 (13 Sep 2020 09:00), Max: 99 (12 Sep 2020 21:44)  HR: 107 (13 Sep 2020 09:00) (104 - 107)  BP: 109/53 (13 Sep 2020 09:00) (103/56 - 109/62)  BP(mean): --  RR: 18 (13 Sep 2020 09:00) (18 - 19)  SpO2: 95% (13 Sep 2020 09:00) (95% - 97%)    I&O's Summary    09-12-20 @ 07:01  -  09-13-20 @ 07:00  --------------------------------------------------------  IN: 1305 mL / OUT: 1100 mL / NET: 205 mL    09-13-20 @ 07:01 - 09-13-20 @ 11:41  --------------------------------------------------------  IN: 500 mL / OUT: 0 mL / NET: 500 mL        PHYSICAL EXAM:  GENERAL: NAD, Comfortable, on room air  HEAD:  Atraumatic, Normocephalic  EYES: EOMI, PERRLA, conjunctiva and sclera clear  NECK: Supple, No JVD  CHEST/LUNG: mild decrease breath sounds bilaterally; No wheeze   HEART: Regular rate and rhythm; No murmurs, rubs, or gallops  ABDOMEN: Soft, Nontender, Nondistended; Bowel sounds present  Neuro: AAO x 2-3 (know her name, and location, somewhat the date appx), no focal deficit  EXTREMITIES:  2+ Peripheral Pulses, No clubbing, cyanosis, or edema  SKIN: No rashes or lesions    LABS:                        11.1   10.41 )-----------( 385      ( 12 Sep 2020 07:13 )             33.4     09-12    140  |  101  |  22  ----------------------------<  127<H>  3.7   |  27  |  0.55    Ca    10.1      12 Sep 2020 07:13        CAPILLARY BLOOD GLUCOSE                RADIOLOGY & ADDITIONAL TESTS:    Imaging Personally Reviewed:  [x] YES  [ ] NO    Consultant(s) Notes Reviewed:  [x] YES  [ ] NO                
Patient is a 88y old  Female who presents with a chief complaint of 88F presenting with generalized weakness (07 Sep 2020 19:36)      SUBJECTIVE / OVERNIGHT EVENTS:  Pt seen and examined at bedside.   No overnight event.  Feeling better.  cont metoprolol to 25 mg BID   LE dopplers (-)      Vital Signs Last 24 Hrs  T(C): 36.3 (09 Sep 2020 13:14), Max: 37.4 (08 Sep 2020 18:23)  T(F): 97.3 (09 Sep 2020 13:14), Max: 99.3 (08 Sep 2020 18:23)  HR: 67 (09 Sep 2020 13:14) (67 - 108)  BP: 142/79 (09 Sep 2020 13:14) (117/76 - 146/80)  BP(mean): --  RR: 18 (09 Sep 2020 13:14) (18 - 18)  SpO2: 98% (09 Sep 2020 13:14) (93% - 98%)    I&O's Summary    09-08-20 @ 07:01  -  09-09-20 @ 07:00  --------------------------------------------------------  IN: 950 mL / OUT: 800 mL / NET: 150 mL          PHYSICAL EXAM:  GENERAL: NAD, Comfortable, on room air, thin elderly, out of bed in chair on room air   HEAD:  Atraumatic, Normocephalic  EYES: EOMI, PERRLA, conjunctiva and sclera clear  NECK: Supple, No JVD  CHEST/LUNG: mild decrease breath sounds bilaterally; No wheeze   HEART: Regular rate and rhythm; No murmurs, rubs, or gallops  ABDOMEN: Soft, Nontender, Nondistended; Bowel sounds present  Neuro: AAO x 2-3 (know her name, and location, somewhat the date appx), no focal deficit  EXTREMITIES:  2+ Peripheral Pulses, No clubbing, cyanosis, or edema  SKIN: No rashes or lesions    LABS:                        11.7   9.31  )-----------( 271      ( 09 Sep 2020 07:22 )             35.4     09-09    139  |  99  |  21  ----------------------------<  109<H>  3.6   |  29  |  0.48<L>    Ca    10.1      09 Sep 2020 07:22        CAPILLARY BLOOD GLUCOSE                RADIOLOGY & ADDITIONAL TESTS:    Imaging Personally Reviewed:  [x] YES  [ ] NO    Consultant(s) Notes Reviewed:  [x] YES  [ ] NO
Patient is a 88y old  Female who presents with a chief complaint of 88F presenting with generalized weakness (07 Sep 2020 19:36)      SUBJECTIVE / OVERNIGHT EVENTS:  Pt seen and examined at bedside.   No overnight event.  Feeling better.  no cp, no sob, no n/v/d.   out of bed in chair  still with mild baseline tachycardia even after IV hydration  increase metoprolol to 25 mg BID   ordered LE dopplers though low suspicion       Vital Signs Last 24 Hrs  T(C): 36.5 (08 Sep 2020 13:04), Max: 37.4 (07 Sep 2020 21:06)  T(F): 97.7 (08 Sep 2020 13:04), Max: 99.3 (07 Sep 2020 21:06)  HR: 106 (08 Sep 2020 13:04) (101 - 117)  BP: 126/84 (08 Sep 2020 13:04) (126/77 - 143/67)  BP(mean): --  RR: 18 (08 Sep 2020 13:04) (18 - 18)  SpO2: 97% (08 Sep 2020 13:04) (95% - 98%)  I&O's Summary    07 Sep 2020 07:01  -  08 Sep 2020 07:00  --------------------------------------------------------  IN: 720 mL / OUT: 600 mL / NET: 120 mL    08 Sep 2020 07:01  -  08 Sep 2020 15:21  --------------------------------------------------------  IN: 900 mL / OUT: 250 mL / NET: 650 mL      PHYSICAL EXAM:  GENERAL: NAD, Comfortable, on room air, thin elderly, out of bed in chair on room air   HEAD:  Atraumatic, Normocephalic  EYES: EOMI, PERRLA, conjunctiva and sclera clear  NECK: Supple, No JVD  CHEST/LUNG: mild decrease breath sounds bilaterally; No wheeze   HEART: Regular rate and rhythm; No murmurs, rubs, or gallops  ABDOMEN: Soft, Nontender, Nondistended; Bowel sounds present  Neuro: AAO x 2-3 (know her name, and location, somewhat the date appx), no focal deficit  EXTREMITIES:  2+ Peripheral Pulses, No clubbing, cyanosis, or edema  SKIN: No rashes or lesions      LABS:                        12.0   11.05 )-----------( 263      ( 08 Sep 2020 07:34 )             35.7     09-08    137  |  99  |  22  ----------------------------<  120<H>  3.8   |  29  |  0.49<L>    Ca    9.8      08 Sep 2020 07:38  Mg     1.7     09-07        CAPILLARY BLOOD GLUCOSE                RADIOLOGY & ADDITIONAL TESTS:    Imaging Personally Reviewed:  [x] YES  [ ] NO    Consultant(s) Notes Reviewed:  [x] YES  [ ] NO      MEDICATIONS  (STANDING):  cefTRIAXone   IVPB 1000 milliGRAM(s) IV Intermittent every 24 hours  enoxaparin Injectable 40 milliGRAM(s) SubCutaneous daily  metoprolol tartrate 25 milliGRAM(s) Oral two times a day  rufinamide 400 milliGRAM(s) Oral <User Schedule>  sodium chloride 0.9%. 1000 milliLiter(s) (50 mL/Hr) IV Continuous <Continuous>    MEDICATIONS  (PRN):      Care Discussed with Consultants/Other Providers [x] YES  [ ] NO    HEALTH ISSUES - PROBLEM Dx:  Meningitis: Meningitis  Pyelonephritis: Pyelonephritis  UTI (urinary tract infection): UTI (urinary tract infection)  Prophylactic measure: Prophylactic measure  Macrocytosis without anemia: Macrocytosis without anemia  Dementia without behavioral disturbance, unspecified dementia type: Dementia without behavioral disturbance, unspecified dementia type  Seizure disorder: Seizure disorder  Urinary tract infection without hematuria, site unspecified: Urinary tract infection without hematuria, site unspecified  Sepsis, due to unspecified organism, unspecified whether acute organ dysfunction present: Sepsis, due to unspecified organism, unspecified whether acute organ dysfunction present
Patient is a 88y old  Female who presents with a chief complaint of 88F presenting with generalized weakness (07 Sep 2020 19:36)      SUBJECTIVE / OVERNIGHT EVENTS:  Pt seen and examined at bedside.   No overnight event.  She denies any new symptosm overnight    Vital Signs Last 24 Hrs  T(C): 36.4 (10 Sep 2020 09:15), Max: 36.7 (10 Sep 2020 00:21)  T(F): 97.6 (10 Sep 2020 09:15), Max: 98.1 (10 Sep 2020 00:21)  HR: 100 (10 Sep 2020 09:15) (67 - 108)  BP: 133/79 (10 Sep 2020 09:15) (100/66 - 142/79)  BP(mean): --  RR: 18 (10 Sep 2020 09:15) (18 - 18)  SpO2: 98% (10 Sep 2020 09:15) (96% - 98%)    I&O's Summary    09-09-20 @ 07:01  -  09-10-20 @ 07:00  --------------------------------------------------------  IN: 520 mL / OUT: 700 mL / NET: -180 mL          PHYSICAL EXAM:  GENERAL: NAD, Comfortable, on room air  HEAD:  Atraumatic, Normocephalic  EYES: EOMI, PERRLA, conjunctiva and sclera clear  NECK: Supple, No JVD  CHEST/LUNG: mild decrease breath sounds bilaterally; No wheeze   HEART: Regular rate and rhythm; No murmurs, rubs, or gallops  ABDOMEN: Soft, Nontender, Nondistended; Bowel sounds present  Neuro: AAO x 2-3 (know her name, and location, somewhat the date appx), no focal deficit  EXTREMITIES:  2+ Peripheral Pulses, No clubbing, cyanosis, or edema  SKIN: No rashes or lesions      LABS:                        10.5   9.58  )-----------( 299      ( 10 Sep 2020 07:20 )             32.3     09-10    137  |  100  |  25<H>  ----------------------------<  106<H>  4.3   |  30  |  0.49<L>    Ca    9.8      10 Sep 2020 07:20        CAPILLARY BLOOD GLUCOSE                RADIOLOGY & ADDITIONAL TESTS:    Imaging Personally Reviewed:  [x] YES  [ ] NO    Consultant(s) Notes Reviewed:  [x] YES  [ ] NO
Patient is a 88y old  Female who presents with a chief complaint of 88F presenting with generalized weakness (07 Sep 2020 19:36)      SUBJECTIVE / OVERNIGHT EVENTS:  SHe is tolerating diet sof ar without any N/V  (+)Malaise  Spoke wtih son yesterday, who voiced concerns about pt's functionality, he would definitely prefer STR    Vital Signs Last 24 Hrs  T(C): 36.3 (11 Sep 2020 09:05), Max: 36.6 (11 Sep 2020 00:45)  T(F): 97.3 (11 Sep 2020 09:05), Max: 97.8 (11 Sep 2020 00:45)  HR: 105 (11 Sep 2020 09:05) (101 - 109)  BP: 115/77 (11 Sep 2020 09:05) (106/68 - 133/80)  BP(mean): --  RR: 18 (11 Sep 2020 09:05) (17 - 18)  SpO2: 98% (11 Sep 2020 09:05) (97% - 98%)    I&O's Summary    09-10-20 @ 07:01  -  09-11-20 @ 07:00  --------------------------------------------------------  IN: 1075 mL / OUT: 500 mL / NET: 575 mL    09-11-20 @ 07:01  - 09-11-20 @ 09:41  --------------------------------------------------------  IN: 0 mL / OUT: 300 mL / NET: -300 mL          PHYSICAL EXAM:  GENERAL: NAD, Comfortable, on room air  HEAD:  Atraumatic, Normocephalic  EYES: EOMI, PERRLA, conjunctiva and sclera clear  NECK: Supple, No JVD  CHEST/LUNG: mild decrease breath sounds bilaterally; No wheeze   HEART: Regular rate and rhythm; No murmurs, rubs, or gallops  ABDOMEN: Soft, Nontender, Nondistended; Bowel sounds present  Neuro: AAO x 2-3 (know her name, and location, somewhat the date appx), no focal deficit  EXTREMITIES:  2+ Peripheral Pulses, No clubbing, cyanosis, or edema  SKIN: No rashes or lesions      LABS:                        11.2   8.53  )-----------( 311      ( 11 Sep 2020 04:56 )             34.5     09-11    138  |  100  |  23  ----------------------------<  111<H>  4.1   |  29  |  0.53    Ca    9.9      11 Sep 2020 04:56        CAPILLARY BLOOD GLUCOSE                RADIOLOGY & ADDITIONAL TESTS:    Imaging Personally Reviewed:  [x] YES  [ ] NO    Consultant(s) Notes Reviewed:  [x] YES  [ ] NO
Patient is a 88y old  Female who presents with a chief complaint of 88F presenting with generalized weakness (15 Sep 2020 08:30)      SUBJECTIVE / OVERNIGHT EVENTS:  feels well  BP stable  no cp, no sob, no n/v/d. no abdominal pain.  no headache, no dizziness.       Vital Signs Last 24 Hrs  T(C): 36.8 (16 Sep 2020 10:58), Max: 37.2 (15 Sep 2020 21:59)  T(F): 98.2 (16 Sep 2020 10:58), Max: 98.9 (15 Sep 2020 21:59)  HR: 85 (16 Sep 2020 10:58) (85 - 117)  BP: 124/69 (16 Sep 2020 10:58) (106/57 - 133/77)  BP(mean): --  RR: 18 (16 Sep 2020 10:58) (18 - 18)  SpO2: 95% (16 Sep 2020 10:58) (95% - 99%)  I&O's Summary    15 Sep 2020 07:01  -  16 Sep 2020 07:00  --------------------------------------------------------  IN: 900 mL / OUT: 1250 mL / NET: -350 mL    16 Sep 2020 07:01  -  16 Sep 2020 16:05  --------------------------------------------------------  IN: 260 mL / OUT: 450 mL / NET: -190 mL        PHYSICAL EXAM:  GENERAL: NAD, Comfortable, on room air  HEAD:  Atraumatic, Normocephalic  EYES: EOMI, PERRLA, conjunctiva and sclera clear  NECK: Supple, No JVD  CHEST/LUNG: mild decrease breath sounds bilaterally; No wheeze   HEART: Regular rate and rhythm; No murmurs, rubs, or gallops  ABDOMEN: Soft, Nontender, Nondistended; Bowel sounds present  Neuro: AAO x 2-3 (know her name, and location, somewhat the date appx), no focal deficit  EXTREMITIES:  2+ Peripheral Pulses, No clubbing, cyanosis, or edema  SKIN: No rashes or lesions    LABS:                        10.9   8.20  )-----------( 484      ( 15 Sep 2020 06:26 )             33.2     09-15    142  |  104  |  26<H>  ----------------------------<  101<H>  3.8   |  28  |  0.59    Ca    9.9      15 Sep 2020 06:26    TPro  6.2  /  Alb  2.7<L>  /  TBili  0.2  /  DBili  x   /  AST  23  /  ALT  40  /  AlkPhos  133<H>  09-15      CAPILLARY BLOOD GLUCOSE                RADIOLOGY & ADDITIONAL TESTS:    Imaging Personally Reviewed:  [x] YES  [ ] NO    Consultant(s) Notes Reviewed:  [x] YES  [ ] NO      MEDICATIONS  (STANDING):  cefTRIAXone   IVPB 1000 milliGRAM(s) IV Intermittent every 24 hours  enoxaparin Injectable 40 milliGRAM(s) SubCutaneous daily  rufinamide 400 milliGRAM(s) Oral <User Schedule>  sodium chloride 0.9%. 1000 milliLiter(s) (50 mL/Hr) IV Continuous <Continuous>    MEDICATIONS  (PRN):      Care Discussed with Consultants/Other Providers [x] YES  [ ] NO    HEALTH ISSUES - PROBLEM Dx:  Hypotension, unspecified  Hypotension, unspecified    Discharge planning issues  Discharge planning issues    Meningitis  Meningitis    Pyelonephritis  Pyelonephritis    UTI (urinary tract infection)  UTI (urinary tract infection)    Prophylactic measure  Prophylactic measure    Macrocytosis without anemia  Macrocytosis without anemia    Dementia without behavioral disturbance, unspecified dementia type  Dementia without behavioral disturbance, unspecified dementia type    Seizure disorder  Seizure disorder    Urinary tract infection without hematuria, site unspecified  Urinary tract infection without hematuria, site unspecified    Sepsis, due to unspecified organism, unspecified whether acute organ dysfunction present  Sepsis, due to unspecified organism, unspecified whether acute organ dysfunction present        
[facility] DIVISION of INFECTIOUS DISEASE  German Angeles MD PhD, Suzanne Zhang MD, Vannessa Pearson MD, Meir Felder MD  and providing coverage with Mary Varela MD and Eitan Sanchez MD  Providing Infectious Disease Consultations at [facility], [facility], [facility]  [phone]    infectious diseases progress note:    KANG VILLAFANA is a 88y y. o. Female patient    No concerning overnight events, pt reports she feels better but not as good as she would like to feel    Allergies     reports severe allergy to PEAS and NUTS (Hives)  Nuts (Hives)  penicillin (Unknown)    Intolerances        ANTIBIOTICS/RELEVANT:  antimicrobials  cefTRIAXone   IVPB 1000 milliGRAM(s) IV Intermittent every 24 hours    immunologic:    OTHER:  enoxaparin Injectable 40 milliGRAM(s) SubCutaneous daily  rufinamide 800 milliGRAM(s) Oral two times a day      Objective:  Vital Signs Last 24 Hrs  T(C): 36.6 (05 Sep 2020 08:34), Max: 37.1 (04 Sep 2020 21:15)  T(F): 97.9 (05 Sep 2020 08:34), Max: 98.8 (04 Sep 2020 21:15)  HR: 98 (05 Sep 2020 08:34) (59 - 103)  BP: 107/69 (05 Sep 2020 08:34) (103/54 - 144/68)  BP(mean): --  RR: 18 (05 Sep 2020 08:34) (16 - 18)  SpO2: 95% (05 Sep 2020 08:34) (93% - 100%)    T(C): 36.6 (20 @ 08:34), Max: 38.1 (20 @ 17:43)  T(C): 36.6 (20 @ 08:34), Max: 38.1 (20 @ 17:43)  T(C): 36.6 (20 @ 08:34), Max: 38.1 (20 @ 17:43)    PHYSICAL EXAM:  HEENT: NC atraumatic  Neck: supple  Respiratory: no accessory muscle use, breathing comfortably  Cardiovascular: distant  Gastrointestinal: normal appearing, nondistended  Extremities: no clubbing, no cyanosis,      LABS:                          12.0   12.77 )-----------( 221      ( 05 Sep 2020 07:31 )             36.6       12.77  @ 07:31  11.07  @ 09:17  12.36  @ 17:57      09    137  |  99  |  15  ----------------------------<  99  3.9   |  28  |  0.53    Ca    9.4      04 Sep 2020 09:17  Phos  2.8       Mg     1.6         TPro  7.3  /  Alb  3.4  /  TBili  0.3  /  DBili  x   /  AST  28  /  ALT  11  /  AlkPhos  81        Creatinine, Serum: 0.53 mg/dL (20 @ 09:17)  Creatinine, Serum: 0.59 mg/dL (20 @ 17:57)      PT/INR - ( 03 Sep 2020 17:57 )   PT: 13.2 sec;   INR: 1.12 ratio         PTT - ( 03 Sep 2020 17:57 )  PTT:29.6 sec  Urinalysis Basic - ( 03 Sep 2020 17:57 )    Color: Yellow / Appearance: Slightly Turbid / S.020 / pH: x  Gluc: x / Ketone: Negative  / Bili: Negative / Urobili: Negative   Blood: x / Protein: Trace / Nitrite: Positive   Leuk Esterase: Large / RBC: 6 /hpf / WBC 41 /HPF   Sq Epi: x / Non Sq Epi: 0 /hpf / Bacteria: Many            INFLAMMATORY MARKERS  Auto Neutrophil #: 8.37 K/uL (20 @ 09:17)  Auto Lymphocyte #: 1.23 K/uL (20 @ 09:17)  Auto Neutrophil #: 10.05 K/uL (20 @ 17:57)  Auto Lymphocyte #: 0.42 K/uL (20 @ 17:57)    Lactate, Blood: 1.4 mmol/L (20 @ 09:17)    Auto Eosinophil #: 0.03 K/uL (20 @ 09:17)  Auto Eosinophil #: 0.00 K/uL (20 @ 17:57)            Creatine Kinase, Serum: 44 U/L (20 @ 17:57)              Activated Partial Thromboplastin Time: 29.6 sec (20 @ 17:57)  INR: 1.12 ratio (20 @ 17:57)          MICROBIOLOGY:    Culture - Urine (20 @ 01:10)    Specimen Source: .Urine Clean Catch (Midstream)    Culture Results:   >100,000 CFU/ml Escherichia coli          RADIOLOGY & ADDITIONAL STUDIES:
[facility], Division of Infectious Diseases  STALIN Mcneal Y. Patel, S. Shah  [phone]    Name: KANG VILLAFANA  Age: 88y  Gender: Female  MRN: [id number]    Interval History--  Notes reviewed  pleasantly confused    Past Medical History--  Dementia  Memory loss due to medical condition  Hypercholesterolemia  IBS (irritable bowel syndrome)  Seizure disorder  Meningitis  Cataract extraction status, right      For details regarding the patient's social history, family history, and other miscellaneous elements, please refer the initial infectious diseases consultation and/or the admitting history and physical examination for this admission.    Allergies     reports severe allergy to PEAS and NUTS (Hives)  Nuts (Hives)  penicillin (Unknown)    Intolerances        Medications--  Antibiotics:  cefTRIAXone   IVPB 1000 milliGRAM(s) IV Intermittent every 24 hours    Immunologic:    Other:  enoxaparin Injectable  metoprolol tartrate  rufinamide  sodium chloride 0.9%.      Review of Systems--  A 10-point review of systems was obtained.     unable to obtain   Review of systems otherwise negative except as previously noted.    Physical Examination--  Vital Signs: T(F): 97.9 (09-09-20 @ 16:46), Max: 99.3 (09-08-20 @ 18:23)  HR: 76 (09-09-20 @ 16:46)  BP: 115/59 (09-09-20 @ 16:46)  RR: 18 (09-09-20 @ 16:46)  SpO2: 96% (09-09-20 @ 16:46)  Wt(kg): --  General: Nontoxic-appearing Female in no acute distress.  HEENT: AT/NcI. Anicteric.  Neck: Not rigid. No sense of mass.  Nodes: None palpable.  Lungs: Clear bilaterally without rales, wheezing or rhonchi  Heart: Regular rate and rhythm.  Abdomen: Bowel sounds present and normoactive. Soft. Nondistended. Nontender.  Back: No spinal tenderness. No costovertebral angle tenderness.   Extremities: No cyanosis or clubbing. No edema.   Skin: Warm. Dry. Good turgor. No rash. No vasculitic stigmata.  Psychiatric: Appropriate affect and mood for situation.         Laboratory Studies--  CBC                        11.7   9.31  )-----------( 271      ( 09 Sep 2020 07:22 )             35.4       Chemistries  09-09    139  |  99  |  21  ----------------------------<  109<H>  3.6   |  29  |  0.48<L>    Ca    10.1      09 Sep 2020 07:22        Culture Data    Culture - Urine (collected 04 Sep 2020 01:10)  Source: .Urine Clean Catch (Midstream)  Final Report (06 Sep 2020 10:27):    >100,000 CFU/ml Escherichia coli  Organism: Escherichia coli (06 Sep 2020 10:27)  Organism: Escherichia coli (06 Sep 2020 10:27)    Culture - Blood (collected 04 Sep 2020 00:37)  Source: .Blood Blood-Peripheral  Final Report (09 Sep 2020 01:01):    No Growth Final    Culture - Blood (collected 04 Sep 2020 00:37)  Source: .Blood Blood-Peripheral  Final Report (09 Sep 2020 01:01):    No Growth Final            < from: [facility] Duplex Lower Ext Vein Scan, [place] (09.08.20 @ 17:00) >    EXAM:  DUPLEX SCAN EXT VEINS LOWER BI                            PROCEDURE DATE:  09/08/2020            INTERPRETATION:  CLINICAL INFORMATION: Lower extremity swelling, rule out DVT.    COMPARISON: None available.    TECHNIQUE: Duplex sonography ofthe BILATERAL LOWER extremity veins with color and spectral Doppler, with and without compression.    FINDINGS:    There is normal compressibility of the bilateral common femoral, femoral and popliteal veins.  Doppler examination shows normal spontaneous and phasic flow.    No calf vein thrombosis is detected.    IMPRESSION:  No evidence of deep venous thrombosis in either lower extremity.    < end of copied text >
Overnight events noted  this morning, pt seems at her baseline level of alertness  she is reporting only lower back pain and a dry cough  Pt was pancultured, CXR looks clear  ?microaspiration event?  spoke with son this morning    Vital Signs Last 24 Hrs  T(C): 36.3 (14 Sep 2020 05:18), Max: 37.2 (13 Sep 2020 17:55)  T(F): 97.4 (14 Sep 2020 05:18), Max: 98.9 (13 Sep 2020 17:55)  HR: 100 (14 Sep 2020 05:18) (100 - 128)  BP: 154/90 (14 Sep 2020 05:18) (86/62 - 154/90)  BP(mean): --  RR: 24 (14 Sep 2020 05:18) (18 - 26)  SpO2: 99% (14 Sep 2020 05:18) (95% - 99%)    I&O's Summary    20 @ 07:01  -  20 @ 07:00  --------------------------------------------------------  IN: 1470 mL / OUT: 900 mL / NET: 570 mL          PHYSICAL EXAM:  GENERAL: NAD, Comfortable, on room air  HEAD:  Atraumatic, Normocephalic  EYES: EOMI, PERRLA, conjunctiva and sclera clear  NECK: Supple, No JVD  CHEST/LUNG: mild decrease breath sounds bilaterally; No wheeze   HEART: Regular rate and rhythm; No murmurs, rubs, or gallops  ABDOMEN: Soft, Nontender, Nondistended; Bowel sounds present  Neuro: AAO x 2-3 (know her name, and location, somewhat the date appx), no focal deficit  EXTREMITIES:  2+ Peripheral Pulses, No clubbing, cyanosis, or edema  SKIN: No rashes or lesions    LABS:                        11.6   11.87 )-----------( 465      ( 13 Sep 2020 18:23 )             35.6     09-13    138  |  98  |  26<H>  ----------------------------<  111<H>  4.0   |  26  |  0.65    Ca    10.6<H>      13 Sep 2020 18:23    TPro  6.8  /  Alb  2.8<L>  /  TBili  0.4  /  DBili  x   /  AST  34  /  ALT  65<H>  /  AlkPhos  164<H>  09-      CAPILLARY BLOOD GLUCOSE            Urinalysis Basic - ( 13 Sep 2020 18:23 )    Color: Light Orange / Appearance: Turbid / S.015 / pH: x  Gluc: x / Ketone: Negative  / Bili: Negative / Urobili: 2 mg/dL   Blood: x / Protein: 30 mg/dL / Nitrite: Negative   Leuk Esterase: Small / RBC: 3 /hpf / WBC 11 /HPF   Sq Epi: x / Non Sq Epi: 20 /hpf / Bacteria: Negative        RADIOLOGY & ADDITIONAL TESTS:    Imaging Personally Reviewed:  [x] YES  [ ] NO    Consultant(s) Notes Reviewed:  [x] YES  [ ] NO                
Last night, again had hypotension and tachycardia, although not as hypotensive as previous night  No fevers documented  She denies any acute pain or SOB    Vital Signs Last 24 Hrs  T(C): 36.4 (15 Sep 2020 05:12), Max: 36.9 (14 Sep 2020 16:51)  T(F): 97.5 (15 Sep 2020 05:12), Max: 98.4 (14 Sep 2020 16:51)  HR: 93 (15 Sep 2020 05:12) (93 - 130)  BP: 121/73 (15 Sep 2020 05:12) (104/68 - 126/78)  BP(mean): --  RR: 18 (15 Sep 2020 05:12) (18 - 20)  SpO2: 100% (15 Sep 2020 05:12) (97% - 100%)    I&O's Summary    20 @ 07:01  -  09-15-20 @ 07:00  --------------------------------------------------------  IN: 650 mL / OUT: 1250 mL / NET: -600 mL        PHYSICAL EXAM:  GENERAL: NAD, Comfortable, on room air  HEAD:  Atraumatic, Normocephalic  EYES: EOMI, PERRLA, conjunctiva and sclera clear  NECK: Supple, No JVD  CHEST/LUNG: mild decrease breath sounds bilaterally; No wheeze   HEART: Regular rate and rhythm; No murmurs, rubs, or gallops  ABDOMEN: Soft, Nontender, Nondistended; Bowel sounds present  Neuro: AAO x 2-3 (know her name, and location, somewhat the date appx), no focal deficit  EXTREMITIES:  2+ Peripheral Pulses, No clubbing, cyanosis, or edema  SKIN: No rashes or lesions    LABS:                        10.9   8.20  )-----------( 484      ( 15 Sep 2020 06:26 )             33.2     09-15    142  |  104  |  26<H>  ----------------------------<  101<H>  3.8   |  28  |  0.59    Ca    9.9      15 Sep 2020 06:26    TPro  6.2  /  Alb  2.7<L>  /  TBili  0.2  /  DBili  x   /  AST  23  /  ALT  40  /  AlkPhos  133<H>  09-15      CAPILLARY BLOOD GLUCOSE            Urinalysis Basic - ( 13 Sep 2020 18:23 )    Color: Light Orange / Appearance: Turbid / S.015 / pH: x  Gluc: x / Ketone: Negative  / Bili: Negative / Urobili: 2 mg/dL   Blood: x / Protein: 30 mg/dL / Nitrite: Negative   Leuk Esterase: Small / RBC: 3 /hpf / WBC 11 /HPF   Sq Epi: x / Non Sq Epi: 20 /hpf / Bacteria: Negative        RADIOLOGY & ADDITIONAL TESTS:    Imaging Personally Reviewed:  [x] YES  [ ] NO    Consultant(s) Notes Reviewed:  [x] YES  [ ] NO

## 2020-09-17 NOTE — PROGRESS NOTE ADULT - PROBLEM SELECTOR PLAN 1
- fever, tachycardia, leukocytosis, UA+  - resolved  - continue with Ceftriaxone IV. No evidence of allergic reaction. Family reports distant penicillin allergy without known reaction with rash or anaphylaxis.  - blood culture neg. urine culture with pansensitive E.coli  - repancultured 9/13/20, monitor blood/urine cultures, lactate improved s/p NS bolus  - ID appreciated  - upon discharge, oral cefpodoxime 200 mg po bid til sept 17.
ecoli very sensitive  wbc down going  afeb   can transition to oral cefpodoxime 200 mg po bid til sept 17
- fever, tachycardia, leukocytosis, UA+  - resolved  - continue with Ceftriaxone IV. No evidence of allergic reaction. Family reports distant penicillin allergy without known reaction with rash or anaphylaxis.  - blood culture neg. urine culture with pansensitive E.coli  - repancultured 9/13/20, monitor blood/urine cultures, lactate improved s/p NS bolus  - ID appreciated  - upon discharge, oral cefpodoxime 200 mg po bid til sept 17.
- fever, tachycardia, leukocytosis, UA+  - VS much improved and nontoxic on exam since ED initiated therapy.  - continue with Ceftriaxone IV. No evidence of allergic reaction. Family reports distant penicillin allergy without known reaction with rash or anaphylaxis.  - blood and urine culture collected  - sufficient IV fluid given by ED 1L, appers euvolemic and VS have corrected. given age favor conservative fluid bolus as needed based upon VS.  - ID consulted. lactic acid normalized.   - Physical therapy. Out of bed to chair with assistance
- fever, tachycardia, leukocytosis, UA+  - VS much improved and nontoxic on exam since ED initiated therapy.  - continue with Ceftriaxone IV. No evidence of allergic reaction. Family reports distant penicillin allergy without known reaction with rash or anaphylaxis.  - blood and urine culture collected  - sufficient IV fluid given by ED 1L, appers euvolemic and VS have corrected. given age favor conservative fluid bolus as needed based upon VS.  - ID consulted. lactic acid normalized.   - Physical therapy. Out of bed to chair with assistance
- fever, tachycardia, leukocytosis, UA+  - VS much improved and nontoxic on exam since ED initiated therapy.  - continue with Ceftriaxone IV. No evidence of allergic reaction. Family reports distant penicillin allergy without known reaction with rash or anaphylaxis.  - blood culture neg. urine culture with pansensitive E.coli  - s/p 1L IVF by ED a few days ago.   - Remain mildly tachycardic and soft BP. s/p IV 50 cc x 10 hours  - still slightly tachycardic even after IVF, start low dose metoprolol 12.5 mg BID, clinically euvolemic   - ID consulted. lactic acid normalized.   - PO regimen on discharge per ID  - Physical therapy. Out of bed to chair with assistance  - pt baseline walk with walker but has been very weak per the . have home aide.
- fever, tachycardia, leukocytosis, UA+  - VS much improved and nontoxic on exam since ED initiated therapy.  - continue with Ceftriaxone IV. No evidence of allergic reaction. Family reports distant penicillin allergy without known reaction with rash or anaphylaxis.  - blood culture neg. urine culture with pansensitive E.coli  - s/p 1L IVF by ED a few days ago.   - Remain mildly tachycardic and soft BP. will give IV 50 cc x 10 hours  - ID consulted. lactic acid normalized.   - PO regimen on discharge per ID  - Physical therapy. Out of bed to chair with assistance  - pt baseline walk with walker but has been very weak per the . have home aide.
- fever, tachycardia, leukocytosis, UA+  - VS much improved and nontoxic on exam since ED initiated therapy.  - continue with Ceftriaxone IV. No evidence of allergic reaction. Family reports distant penicillin allergy without known reaction with rash or anaphylaxis.  - blood culture neg. urine culture with pansensitive E.coli  - s/p 1L IVF by ED a few days ago.   - Remain mildly tachycardic even after IVF.  - started low dose metoprolol 12.5 mg BID increased to 25 mg BID  (ordered LE dopplers though low suspicion, no hypoxia, no leg pain)  - clinically euvolemic   - ID consulted. lactic acid normalized.   - PO regimen on discharge per ID  - Physical therapy. Out of bed to chair with assistance  - pt baseline walk with walker but has been very weak per the . Have home aide. may need rehab.
- fever, tachycardia, leukocytosis, UA+  - VS much improved and nontoxic on exam since ED initiated therapy.  - continue with Ceftriaxone IV. No evidence of allergic reaction. Family reports distant penicillin allergy without known reaction with rash or anaphylaxis.  - blood culture neg. urine culture with pansensitive E.coli  - s/p 1L IVF by ED a few days ago.   - Remain mildly tachycardic even after IVF.  - started low dose metoprolol 12.5 mg BID increased to 25 mg BID  (ordered LE dopplers though low suspicion, no hypoxia, no leg pain)  - clinically euvolemic   - ID consulted. lactic acid normalized.   - PO regimen on discharge per ID  - Physical therapy. Out of bed to chair with assistance  - pt baseline walk with walker but has been very weak per the . Have home aide. may need rehab.
- fever, tachycardia, leukocytosis, UA+  - VS much improved and nontoxic on exam since ED initiated therapy.  - continue with Ceftriaxone IV. No evidence of allergic reaction. Family reports distant penicillin allergy without known reaction with rash or anaphylaxis.  - blood culture neg. urine culture with pansensitive E.coli  - s/p 1L IVF by ED a few days ago.   - Remain mildly tachycardic even after IVF.  - started low dose metoprolol 12.5 mg BID increased to 25 mg BID  (ordered LE dopplers though low suspicion, no hypoxia, no leg pain)  - clinically euvolemic   - ID consulted. lactic acid normalized.   - oral cefpodoxime 200 mg po bid til sept 17.  - Physical therapy. Out of bed to chair with assistance  - pt baseline walk with walker but has been very weak per the . Have home aide. may need rehab.
- fever, tachycardia, leukocytosis, UA+  - resolved  - continue with Ceftriaxone IV. No evidence of allergic reaction. Family reports distant penicillin allergy without known reaction with rash or anaphylaxis.  - blood culture neg. urine culture with pansensitive E.coli  - clinically euvolemic   - ID appreciated  - upon discharge, oral cefpodoxime 200 mg po bid til sept 17.
- fever, tachycardia, leukocytosis, UA+  - resolved  - continue with Ceftriaxone IV. No evidence of allergic reaction. Family reports distant penicillin allergy without known reaction with rash or anaphylaxis.  - blood culture neg. urine culture with pansensitive E.coli  - clinically euvolemic   - ID appreciated  - upon discharge, oral cefpodoxime 200 mg po bid til sept 17.
- fever, tachycardia, leukocytosis, UA+  - resolved  - continue with Ceftriaxone IV. No evidence of allergic reaction. Family reports distant penicillin allergy without known reaction with rash or anaphylaxis.  - blood culture neg. urine culture with pansensitive E.coli  - clinically euvolemic   - ID appreciated  - upon discharge, oral cefpodoxime 200 mg po bid til sept 17.  - Physical therapy. Out of bed to chair with assistance  - pt baseline walk with walker but has been very weak per the . Have home aide. may need rehab.
- fever, tachycardia, leukocytosis, UA+  - resolved  - continue with Ceftriaxone IV. No evidence of allergic reaction. Family reports distant penicillin allergy without known reaction with rash or anaphylaxis.  - blood culture neg. urine culture with pansensitive E.coli  - repancultured 9/13/20, monitor blood/urine cultures, lactate improved s/p NS bolus  - ID appreciated  - upon discharge, oral cefpodoxime 200 mg po bid til sept 17.
ecoli very sensitive  wbc down going  afeb   can transition to oral cefpodoxime 200 mg po bid til sept 17
pansensitive E coli-continue IV abx for now with further recs to follow regarding transition to PO
with GNRs in urine continue current abx with recs to follow based on sensitivities
- fever, tachycardia, leukocytosis, UA+  - resolved  - continue with Ceftriaxone IV. No evidence of allergic reaction. Family reports distant penicillin allergy without known reaction with rash or anaphylaxis.  - blood culture neg. urine culture with pansensitive E.coli  - repancultured 9/13/20, monitor blood/urine cultures, lactate improved s/p NS bolus  - ID appreciated  - upon discharge, oral cefpodoxime 200 mg po bid til sept 17.

## 2020-09-17 NOTE — PROGRESS NOTE ADULT - PROBLEM SELECTOR PLAN 5
b12 and folate levels wnl
-  currently holding memantine
b12 and folate levels wnl
obtain b12 and folate levels (sent)
obtain b12 and folate levels (sent)

## 2020-09-17 NOTE — PROGRESS NOTE ADULT - PROBLEM SELECTOR PLAN 4
c/w home dosing of Banzel 400 mg q8hrs  - drug level collected in ED  - Neurology eval appreciated.  - if EEG not done by the time rehab placement, can do it as outpt. Likely won't change the management since pt is back to baseline.
c/w home dosing of Banzel 400 mg q8hrs  - drug level collected in ED  - Neurology eval appreciated.  - if EEG not done by the time rehab placement, can do it as outpt. Likely won't change the management since pt is back to baseline.
-  currently holding memantine
-  currently holding memantine  - would review during day if family wants to reinitiate
c/w home dosing of Banzel 400 mg q8hrs  - drug level collected in ED  - Neurology eval appreciated.  - if EEG not done by the time rehab placement, can do it as outpt. Likely won't change the management since pt is back to baseline.
-  currently holding memantine

## 2020-09-17 NOTE — PROGRESS NOTE ADULT - PROBLEM SELECTOR PLAN 7
Physical therapy. Out of bed to chair with assistance  Pt at baseline walks with walker but has been very weak per the . Have home aide. may need rehab.
Lovenox for dvt ppx
Physical therapy. Out of bed to chair with assistance  Pt at baseline walks with walker but has been very weak per the . Have home aide. may need rehab.
Physical therapy. Out of bed to chair with assistance  Pt at baseline walks with walker but has been very weak per the . Have home aide. may need rehab.

## 2020-09-17 NOTE — PROGRESS NOTE ADULT - PROBLEM SELECTOR PROBLEM 1
Sepsis, due to unspecified organism, unspecified whether acute organ dysfunction present
Pyelonephritis
Sepsis, due to unspecified organism, unspecified whether acute organ dysfunction present
Pyelonephritis
Sepsis, due to unspecified organism, unspecified whether acute organ dysfunction present
Sepsis, due to unspecified organism, unspecified whether acute organ dysfunction present

## 2020-09-17 NOTE — PROGRESS NOTE ADULT - ATTENDING COMMENTS
ID will sign off  call with questions   thank you  [phone]
- Dr. MARIA ELENA Isaac ([facility])  - [phone]
- Dr. MARIA ELENA Isaac ([facility])  - [phone]
- Dr. MARIA ELENA Isaac ([facility])  - [phone]
- Dr. MARIA ELENA Isaac ([facility])  - [phone]
Aj Álvarez will be covering for me starting 9/9/20. He can be reached at  if needed.     - Dr. MARIA ELENA Isaac ([facility])  - [phone]
- Dr. MARIA ELENA Isaac ([facility])  - [phone]
- Dr. MARIA ELENA Isaac ([facility])  - [phone]

## 2020-09-17 NOTE — PROGRESS NOTE ADULT - PROBLEM SELECTOR PROBLEM 3
Hypotension, unspecified
Seizure disorder

## 2020-09-17 NOTE — PROGRESS NOTE ADULT - PROBLEM SELECTOR PROBLEM 6
Prophylactic measure
Macrocytosis without anemia
Prophylactic measure

## 2020-09-17 NOTE — PROGRESS NOTE ADULT - PROBLEM SELECTOR PROBLEM 7
Discharge planning issues
Prophylactic measure
Prophylactic measure
Discharge planning issues
Discharge planning issues
Prophylactic measure

## 2020-09-17 NOTE — PROGRESS NOTE ADULT - PROBLEM SELECTOR PROBLEM 4
Seizure disorder
Seizure disorder
Dementia without behavioral disturbance, unspecified dementia type
Seizure disorder
Dementia without behavioral disturbance, unspecified dementia type

## 2020-09-17 NOTE — PROGRESS NOTE ADULT - ASSESSMENT
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 The patient reportedly has been having worsening gait instability and generalized weakness over last week. She was evaluated for typical check up with PCP 6d prior on 8/28/20 and on 9/1 they were informed she had UTI and was prescribed ciprofloxacin which was picked up but not started.  Cipro causes seizures which the patient had in the hospital     EEG monitoring if able to do    PT has neuropathy
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
This is an 88 year old female with generalized weakness     UTI -as per medicine team     Seizures- on Banzel TID with no seizures     Dementia Namenda on hold for now
h/o dementia and sz  eeg not done ordered /called -no answer  changed banzel to 400 tid
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

## 2020-09-17 NOTE — PROGRESS NOTE ADULT - PROBLEM SELECTOR PLAN 8
Physical therapy. Out of bed to chair with assistance  Pt at baseline walks with walker but has been very weak per the . Have home aide. may need rehab.

## 2020-09-17 NOTE — PROGRESS NOTE ADULT - NUTRITIONAL ASSESSMENT
This patient has been assessed with a concern for Malnutrition and has been determined to have a diagnosis/diagnoses of Underweight/BMI < 19.    This patient is being managed with:   Diet Regular-  Nut Restricted  Kosher  Supplement Feeding Modality:  Oral  Ensure Enlive Cans or Servings Per Day:  2       Frequency:  Daily  Entered: Sep  7 2020  1:18PM    

## 2020-09-17 NOTE — PROGRESS NOTE ADULT - PROBLEM SELECTOR PLAN 6
Lovenox for dvt ppx
b12 and folate levels wnl
b12 and folate levels wnl
Lovenox for dvt ppx
b12 and folate levels wnl

## 2020-09-17 NOTE — PROGRESS NOTE ADULT - PROBLEM SELECTOR PLAN 2
- on ceftriaxone  - upon discharge, will change over to cefpodoxime with last day on 9/17/20
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
- on ceftriaxone  - upon discharge, will change over to cefpodoxime with last day on 9/17/20
- see above
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
- on ceftriaxone  - upon discharge, will change over to cefpodoxime with last day on 9/17/20

## 2020-09-18 ENCOUNTER — TRANSCRIPTION ENCOUNTER (OUTPATIENT)
Age: 85
End: 2020-09-18

## 2020-09-18 VITALS
OXYGEN SATURATION: 98 % | SYSTOLIC BLOOD PRESSURE: 120 MMHG | RESPIRATION RATE: 18 BRPM | TEMPERATURE: 98 F | HEART RATE: 91 BPM | DIASTOLIC BLOOD PRESSURE: 83 MMHG

## 2020-09-18 LAB
ANION GAP SERPL CALC-SCNC: 10 MMOL/L — SIGNIFICANT CHANGE UP (ref 5–17)
BUN SERPL-MCNC: 22 MG/DL — SIGNIFICANT CHANGE UP (ref 7–23)
CALCIUM SERPL-MCNC: 9.7 MG/DL — SIGNIFICANT CHANGE UP (ref 8.4–10.5)
CHLORIDE SERPL-SCNC: 105 MMOL/L — SIGNIFICANT CHANGE UP (ref 96–108)
CO2 SERPL-SCNC: 26 MMOL/L — SIGNIFICANT CHANGE UP (ref 22–31)
CREAT SERPL-MCNC: 0.56 MG/DL — SIGNIFICANT CHANGE UP (ref 0.5–1.3)
CULTURE RESULTS: SIGNIFICANT CHANGE UP
CULTURE RESULTS: SIGNIFICANT CHANGE UP
GLUCOSE SERPL-MCNC: 96 MG/DL — SIGNIFICANT CHANGE UP (ref 70–99)
HCT VFR BLD CALC: 31 % — LOW (ref 34.5–45)
HGB BLD-MCNC: 10 G/DL — LOW (ref 11.5–15.5)
MCHC RBC-ENTMCNC: 32.2 PG — SIGNIFICANT CHANGE UP (ref 27–34)
MCHC RBC-ENTMCNC: 32.3 GM/DL — SIGNIFICANT CHANGE UP (ref 32–36)
MCV RBC AUTO: 99.7 FL — SIGNIFICANT CHANGE UP (ref 80–100)
NRBC # BLD: 0 /100 WBCS — SIGNIFICANT CHANGE UP (ref 0–0)
PLATELET # BLD AUTO: 598 K/UL — HIGH (ref 150–400)
POTASSIUM SERPL-MCNC: 3.4 MMOL/L — LOW (ref 3.5–5.3)
POTASSIUM SERPL-SCNC: 3.4 MMOL/L — LOW (ref 3.5–5.3)
RBC # BLD: 3.11 M/UL — LOW (ref 3.8–5.2)
RBC # FLD: 13.2 % — SIGNIFICANT CHANGE UP (ref 10.3–14.5)
SODIUM SERPL-SCNC: 141 MMOL/L — SIGNIFICANT CHANGE UP (ref 135–145)
SPECIMEN SOURCE: SIGNIFICANT CHANGE UP
SPECIMEN SOURCE: SIGNIFICANT CHANGE UP
WBC # BLD: 6.92 K/UL — SIGNIFICANT CHANGE UP (ref 3.8–10.5)
WBC # FLD AUTO: 6.92 K/UL — SIGNIFICANT CHANGE UP (ref 3.8–10.5)

## 2020-09-18 PROCEDURE — 82962 GLUCOSE BLOOD TEST: CPT

## 2020-09-18 PROCEDURE — 83735 ASSAY OF MAGNESIUM: CPT

## 2020-09-18 PROCEDURE — U0003: CPT

## 2020-09-18 PROCEDURE — 97530 THERAPEUTIC ACTIVITIES: CPT

## 2020-09-18 PROCEDURE — G0480: CPT

## 2020-09-18 PROCEDURE — 84443 ASSAY THYROID STIM HORMONE: CPT

## 2020-09-18 PROCEDURE — 85730 THROMBOPLASTIN TIME PARTIAL: CPT

## 2020-09-18 PROCEDURE — 80048 BASIC METABOLIC PNL TOTAL CA: CPT

## 2020-09-18 PROCEDURE — 81001 URINALYSIS AUTO W/SCOPE: CPT

## 2020-09-18 PROCEDURE — 85027 COMPLETE CBC AUTOMATED: CPT

## 2020-09-18 PROCEDURE — 97110 THERAPEUTIC EXERCISES: CPT

## 2020-09-18 PROCEDURE — 82607 VITAMIN B-12: CPT

## 2020-09-18 PROCEDURE — 87040 BLOOD CULTURE FOR BACTERIA: CPT

## 2020-09-18 PROCEDURE — 82746 ASSAY OF FOLIC ACID SERUM: CPT

## 2020-09-18 PROCEDURE — 70450 CT HEAD/BRAIN W/O DYE: CPT

## 2020-09-18 PROCEDURE — 86769 SARS-COV-2 COVID-19 ANTIBODY: CPT

## 2020-09-18 PROCEDURE — 93970 EXTREMITY STUDY: CPT

## 2020-09-18 PROCEDURE — 84100 ASSAY OF PHOSPHORUS: CPT

## 2020-09-18 PROCEDURE — 87186 SC STD MICRODIL/AGAR DIL: CPT

## 2020-09-18 PROCEDURE — 82550 ASSAY OF CK (CPK): CPT

## 2020-09-18 PROCEDURE — 85025 COMPLETE CBC W/AUTO DIFF WBC: CPT

## 2020-09-18 PROCEDURE — 71045 X-RAY EXAM CHEST 1 VIEW: CPT

## 2020-09-18 PROCEDURE — 80053 COMPREHEN METABOLIC PANEL: CPT

## 2020-09-18 PROCEDURE — 97161 PT EVAL LOW COMPLEX 20 MIN: CPT

## 2020-09-18 PROCEDURE — 93005 ELECTROCARDIOGRAM TRACING: CPT

## 2020-09-18 PROCEDURE — 85610 PROTHROMBIN TIME: CPT

## 2020-09-18 PROCEDURE — 96374 THER/PROPH/DIAG INJ IV PUSH: CPT

## 2020-09-18 PROCEDURE — 83605 ASSAY OF LACTIC ACID: CPT

## 2020-09-18 PROCEDURE — 99285 EMERGENCY DEPT VISIT HI MDM: CPT | Mod: 25

## 2020-09-18 PROCEDURE — 87086 URINE CULTURE/COLONY COUNT: CPT

## 2020-09-18 PROCEDURE — 72170 X-RAY EXAM OF PELVIS: CPT

## 2020-09-18 RX ORDER — POTASSIUM CHLORIDE 20 MEQ
20 PACKET (EA) ORAL ONCE
Refills: 0 | Status: COMPLETED | OUTPATIENT
Start: 2020-09-18 | End: 2020-09-18

## 2020-09-18 RX ORDER — RANIBIZUMAB 10 MG/ML
0 INJECTION, SOLUTION INTRAVITREAL
Qty: 0 | Refills: 0 | DISCHARGE

## 2020-09-18 RX ORDER — MEMANTINE HYDROCHLORIDE 10 MG/1
1 TABLET ORAL
Qty: 0 | Refills: 0 | DISCHARGE

## 2020-09-18 RX ORDER — RUFINAMIDE 40 MG/ML
2 SUSPENSION ORAL
Qty: 0 | Refills: 0 | DISCHARGE

## 2020-09-18 RX ORDER — DUPILUMAB 300 MG/2ML
0 INJECTION, SOLUTION SUBCUTANEOUS
Qty: 0 | Refills: 0 | DISCHARGE

## 2020-09-18 RX ORDER — RUFINAMIDE 40 MG/ML
1 SUSPENSION ORAL
Qty: 0 | Refills: 0 | DISCHARGE
Start: 2020-09-18

## 2020-09-18 RX ADMIN — RUFINAMIDE 400 MILLIGRAM(S): 40 SUSPENSION ORAL at 06:21

## 2020-09-18 RX ADMIN — Medication 20 MILLIEQUIVALENT(S): at 13:34

## 2020-09-18 RX ADMIN — ENOXAPARIN SODIUM 40 MILLIGRAM(S): 100 INJECTION SUBCUTANEOUS at 12:21

## 2020-09-18 RX ADMIN — RUFINAMIDE 400 MILLIGRAM(S): 40 SUSPENSION ORAL at 13:30

## 2020-09-18 NOTE — DISCHARGE NOTE NURSING/CASE MANAGEMENT/SOCIAL WORK - NSDCPNINST_GEN_ALL_CORE
If unable to tolerate diet, nausea, vomiting, fever above 100.3, chills, or an increase in pain, notify provider or return to Er.

## 2020-09-18 NOTE — DISCHARGE NOTE NURSING/CASE MANAGEMENT/SOCIAL WORK - PATIENT PORTAL LINK FT
You can access the FollowMyHealth Patient Portal offered by [facility] by registering at the following website: [email or web address]. By joining [employer]’s FollowMyHealth portal, you will also be able to view your health information using other applications (apps) compatible with our system.

## 2020-09-21 ENCOUNTER — RECORD ABSTRACTING (OUTPATIENT)
Age: 85
End: 2020-09-21

## 2020-09-21 ENCOUNTER — NON-APPOINTMENT (OUTPATIENT)
Age: 85
End: 2020-09-21

## 2020-09-22 ENCOUNTER — APPOINTMENT (OUTPATIENT)
Dept: DERMATOLOGY | Facility: CLINIC | Age: 85
End: 2020-09-22

## 2020-11-17 ENCOUNTER — NON-APPOINTMENT (OUTPATIENT)
Age: 85
End: 2020-11-17

## 2020-12-21 PROBLEM — Z86.69 HISTORY OF CONJUNCTIVITIS: Status: RESOLVED | Noted: 2017-12-15 | Resolved: 2020-12-21

## 2021-01-12 ENCOUNTER — NON-APPOINTMENT (OUTPATIENT)
Age: 86
End: 2021-01-12

## 2021-01-18 ENCOUNTER — RX RENEWAL (OUTPATIENT)
Age: 86
End: 2021-01-18

## 2021-01-26 NOTE — PATIENT PROFILE ADULT - NSPROGENBLOODRESTRICT_GEN_A_NUR
I have personally performed a face to face diagnostic evaluation on this patient. I have reviewed the ACP note and agree with the history, exam and plan of care, except as noted. unable to assess pt confused at present time

## 2021-02-15 NOTE — H&P ADULT - PROBLEM SELECTOR PROBLEM 1
I mailed out colonoscopy results letter to pt  Updated health maintenance  Entered into 3 yr CLN recall  Recall colon in 3 years per.  Colon done 2/10/2021      Guzman Diallo MD  [facility] Clinical Staff             GI staff: please place recall for col Sepsis, due to unspecified organism, unspecified whether acute organ dysfunction present

## 2021-04-06 ENCOUNTER — NON-APPOINTMENT (OUTPATIENT)
Age: 86
End: 2021-04-06

## 2021-04-06 PROBLEM — F03.90 UNSPECIFIED DEMENTIA WITHOUT BEHAVIORAL DISTURBANCE: Chronic | Status: ACTIVE | Noted: 2020-09-03

## 2021-05-12 ENCOUNTER — APPOINTMENT (OUTPATIENT)
Dept: DERMATOLOGY | Facility: CLINIC | Age: 86
End: 2021-05-12

## 2021-05-17 NOTE — H&P ADULT - NSHPSOCIALHISTORY_GEN_ALL_CORE
Personalized Preventive Plan for Katie Pinto. - 5/17/2021  Medicare offers a range of preventive health benefits. Some of the tests and screenings are paid in full while other may be subject to a deductible, co-insurance, and/or copay. Some of these benefits include a comprehensive review of your medical history including lifestyle, illnesses that may run in your family, and various assessments and screenings as appropriate. After reviewing your medical record and screening and assessments performed today your provider may have ordered immunizations, labs, imaging, and/or referrals for you. A list of these orders (if applicable) as well as your Preventive Care list are included within your After Visit Summary for your review. Other Preventive Recommendations:    · A preventive eye exam performed by an eye specialist is recommended every 1-2 years to screen for glaucoma; cataracts, macular degeneration, and other eye disorders. · A preventive dental visit is recommended every 6 months. · Try to get at least 150 minutes of exercise per week or 10,000 steps per day on a pedometer . · Order or download the FREE \"Exercise & Physical Activity: Your Everyday Guide\" from [employer]. Call 3-705.650.4803 or search [employer] online. · You need 9727-7638 mg of calcium and 6021-8219 IU of vitamin D per day. It is possible to meet your calcium requirement with diet alone, but a vitamin D supplement is usually necessary to meet this goal.  · When exposed to the sun, use a sunscreen that protects against both UVA and UVB radiation with an SPF of 30 or greater. Reapply every 2 to 3 hours or after sweating, drying off with a towel, or swimming. · Always wear a seat belt when traveling in a car. Always wear a helmet when riding a bicycle or motorcycle.
never smoker, no alcohol, lives with

## 2021-05-25 ENCOUNTER — APPOINTMENT (OUTPATIENT)
Dept: DERMATOLOGY | Facility: CLINIC | Age: 86
End: 2021-05-25

## 2021-06-08 ENCOUNTER — APPOINTMENT (OUTPATIENT)
Dept: DERMATOLOGY | Facility: CLINIC | Age: 86
End: 2021-06-08
Payer: MEDICARE

## 2021-06-08 DIAGNOSIS — L85.3 XEROSIS CUTIS: ICD-10-CM

## 2021-06-08 PROCEDURE — 96401 CHEMO ANTI-NEOPL SQ/IM: CPT

## 2021-06-08 PROCEDURE — 99213 OFFICE O/P EST LOW 20 MIN: CPT | Mod: 25

## 2021-06-21 ENCOUNTER — APPOINTMENT (OUTPATIENT)
Dept: OPHTHALMOLOGY | Facility: CLINIC | Age: 86
End: 2021-06-21

## 2021-07-09 ENCOUNTER — APPOINTMENT (OUTPATIENT)
Dept: DERMATOLOGY | Facility: CLINIC | Age: 86
End: 2021-07-09

## 2021-07-22 ENCOUNTER — APPOINTMENT (OUTPATIENT)
Dept: DERMATOLOGY | Facility: CLINIC | Age: 86
End: 2021-07-22

## 2021-08-11 ENCOUNTER — NON-APPOINTMENT (OUTPATIENT)
Age: 86
End: 2021-08-11

## 2021-08-17 ENCOUNTER — APPOINTMENT (OUTPATIENT)
Dept: DERMATOLOGY | Facility: CLINIC | Age: 86
End: 2021-08-17
Payer: MEDICARE

## 2021-08-17 DIAGNOSIS — R21 RASH AND OTHER NONSPECIFIC SKIN ERUPTION: ICD-10-CM

## 2021-08-17 PROCEDURE — 99214 OFFICE O/P EST MOD 30 MIN: CPT | Mod: GC

## 2021-08-17 RX ORDER — KETOCONAZOLE 20.5 MG/ML
2 SHAMPOO, SUSPENSION TOPICAL
Qty: 1 | Refills: 11 | Status: ACTIVE | COMMUNITY
Start: 2021-08-17 | End: 1900-01-01

## 2021-09-28 ENCOUNTER — APPOINTMENT (OUTPATIENT)
Dept: DERMATOLOGY | Facility: CLINIC | Age: 86
End: 2021-09-28

## 2021-10-22 ENCOUNTER — APPOINTMENT (OUTPATIENT)
Dept: DERMATOLOGY | Facility: CLINIC | Age: 86
End: 2021-10-22
Payer: MEDICARE

## 2021-10-22 DIAGNOSIS — L30.9 DERMATITIS, UNSPECIFIED: ICD-10-CM

## 2021-10-22 DIAGNOSIS — Z79.899 OTHER LONG TERM (CURRENT) DRUG THERAPY: ICD-10-CM

## 2021-10-22 PROCEDURE — 96401 CHEMO ANTI-NEOPL SQ/IM: CPT

## 2021-10-22 PROCEDURE — 99213 OFFICE O/P EST LOW 20 MIN: CPT | Mod: 25

## 2021-10-24 ENCOUNTER — RX RENEWAL (OUTPATIENT)
Age: 86
End: 2021-10-24

## 2021-11-12 ENCOUNTER — APPOINTMENT (OUTPATIENT)
Dept: DERMATOLOGY | Facility: CLINIC | Age: 86
End: 2021-11-12
Payer: MEDICARE

## 2021-11-12 PROCEDURE — 96401 CHEMO ANTI-NEOPL SQ/IM: CPT

## 2021-11-12 PROCEDURE — 99212 OFFICE O/P EST SF 10 MIN: CPT | Mod: 25

## 2021-11-26 ENCOUNTER — APPOINTMENT (OUTPATIENT)
Dept: DERMATOLOGY | Facility: CLINIC | Age: 86
End: 2021-11-26

## 2022-01-25 ENCOUNTER — APPOINTMENT (OUTPATIENT)
Dept: DERMATOLOGY | Facility: CLINIC | Age: 87
End: 2022-01-25

## 2022-02-27 ENCOUNTER — INPATIENT (INPATIENT)
Facility: HOSPITAL | Age: 87
LOS: 1 days | Discharge: HOME HEALTH | DRG: 562 | End: 2022-03-01
Attending: INTERNAL MEDICINE | Admitting: INTERNAL MEDICINE
Payer: MEDICARE

## 2022-02-27 VITALS
TEMPERATURE: 98 F | HEART RATE: 99 BPM | WEIGHT: 104.94 LBS | DIASTOLIC BLOOD PRESSURE: 75 MMHG | RESPIRATION RATE: 20 BRPM | SYSTOLIC BLOOD PRESSURE: 137 MMHG | HEIGHT: 64 IN | OXYGEN SATURATION: 96 %

## 2022-02-27 DIAGNOSIS — Z98.41 CATARACT EXTRACTION STATUS, RIGHT EYE: Chronic | ICD-10-CM

## 2022-02-27 DIAGNOSIS — R55 SYNCOPE AND COLLAPSE: ICD-10-CM

## 2022-02-27 LAB
ALBUMIN SERPL ELPH-MCNC: 4 G/DL — SIGNIFICANT CHANGE UP (ref 3.3–5)
ALP SERPL-CCNC: 99 U/L — SIGNIFICANT CHANGE UP (ref 40–120)
ALT FLD-CCNC: 13 U/L — SIGNIFICANT CHANGE UP (ref 10–45)
ANION GAP SERPL CALC-SCNC: 17 MMOL/L — SIGNIFICANT CHANGE UP (ref 5–17)
APTT BLD: 24.1 SEC — LOW (ref 27.5–35.5)
AST SERPL-CCNC: 16 U/L — SIGNIFICANT CHANGE UP (ref 10–40)
BASOPHILS # BLD AUTO: 0.07 K/UL — SIGNIFICANT CHANGE UP (ref 0–0.2)
BASOPHILS NFR BLD AUTO: 0.6 % — SIGNIFICANT CHANGE UP (ref 0–2)
BILIRUB SERPL-MCNC: 0.2 MG/DL — SIGNIFICANT CHANGE UP (ref 0.2–1.2)
BUN SERPL-MCNC: 28 MG/DL — HIGH (ref 7–23)
CALCIUM SERPL-MCNC: 9.7 MG/DL — SIGNIFICANT CHANGE UP (ref 8.4–10.5)
CHLORIDE SERPL-SCNC: 105 MMOL/L — SIGNIFICANT CHANGE UP (ref 96–108)
CO2 SERPL-SCNC: 20 MMOL/L — LOW (ref 22–31)
CREAT SERPL-MCNC: 0.76 MG/DL — SIGNIFICANT CHANGE UP (ref 0.5–1.3)
EOSINOPHIL # BLD AUTO: 0.22 K/UL — SIGNIFICANT CHANGE UP (ref 0–0.5)
EOSINOPHIL NFR BLD AUTO: 1.8 % — SIGNIFICANT CHANGE UP (ref 0–6)
GLUCOSE SERPL-MCNC: 176 MG/DL — HIGH (ref 70–99)
HCT VFR BLD CALC: 38.4 % — SIGNIFICANT CHANGE UP (ref 34.5–45)
HGB BLD-MCNC: 12.5 G/DL — SIGNIFICANT CHANGE UP (ref 11.5–15.5)
IMM GRANULOCYTES NFR BLD AUTO: 1.6 % — HIGH (ref 0–1.5)
INR BLD: 1.06 RATIO — SIGNIFICANT CHANGE UP (ref 0.88–1.16)
LYMPHOCYTES # BLD AUTO: 18.8 % — SIGNIFICANT CHANGE UP (ref 13–44)
LYMPHOCYTES # BLD AUTO: 2.3 K/UL — SIGNIFICANT CHANGE UP (ref 1–3.3)
MCHC RBC-ENTMCNC: 30.3 PG — SIGNIFICANT CHANGE UP (ref 27–34)
MCHC RBC-ENTMCNC: 32.6 GM/DL — SIGNIFICANT CHANGE UP (ref 32–36)
MCV RBC AUTO: 93 FL — SIGNIFICANT CHANGE UP (ref 80–100)
MONOCYTES # BLD AUTO: 0.72 K/UL — SIGNIFICANT CHANGE UP (ref 0–0.9)
MONOCYTES NFR BLD AUTO: 5.9 % — SIGNIFICANT CHANGE UP (ref 2–14)
NEUTROPHILS # BLD AUTO: 8.71 K/UL — HIGH (ref 1.8–7.4)
NEUTROPHILS NFR BLD AUTO: 71.3 % — SIGNIFICANT CHANGE UP (ref 43–77)
NRBC # BLD: 0 /100 WBCS — SIGNIFICANT CHANGE UP (ref 0–0)
PLATELET # BLD AUTO: 285 K/UL — SIGNIFICANT CHANGE UP (ref 150–400)
POTASSIUM SERPL-MCNC: 4.1 MMOL/L — SIGNIFICANT CHANGE UP (ref 3.5–5.3)
POTASSIUM SERPL-SCNC: 4.1 MMOL/L — SIGNIFICANT CHANGE UP (ref 3.5–5.3)
PROT SERPL-MCNC: 7.3 G/DL — SIGNIFICANT CHANGE UP (ref 6–8.3)
PROTHROM AB SERPL-ACNC: 12.7 SEC — SIGNIFICANT CHANGE UP (ref 10.5–13.4)
RBC # BLD: 4.13 M/UL — SIGNIFICANT CHANGE UP (ref 3.8–5.2)
RBC # FLD: 13.8 % — SIGNIFICANT CHANGE UP (ref 10.3–14.5)
SARS-COV-2 RNA SPEC QL NAA+PROBE: SIGNIFICANT CHANGE UP
SODIUM SERPL-SCNC: 142 MMOL/L — SIGNIFICANT CHANGE UP (ref 135–145)
WBC # BLD: 12.21 K/UL — HIGH (ref 3.8–10.5)
WBC # FLD AUTO: 12.21 K/UL — HIGH (ref 3.8–10.5)

## 2022-02-27 PROCEDURE — 99285 EMERGENCY DEPT VISIT HI MDM: CPT | Mod: 57

## 2022-02-27 PROCEDURE — 73610 X-RAY EXAM OF ANKLE: CPT | Mod: 26,LT

## 2022-02-27 PROCEDURE — 73630 X-RAY EXAM OF FOOT: CPT | Mod: 26,LT

## 2022-02-27 PROCEDURE — 73060 X-RAY EXAM OF HUMERUS: CPT | Mod: 26,LT

## 2022-02-27 PROCEDURE — 23650 CLTX SHO DSLC W/MNPJ WO ANES: CPT | Mod: 54

## 2022-02-27 PROCEDURE — 72125 CT NECK SPINE W/O DYE: CPT | Mod: 26,MA

## 2022-02-27 PROCEDURE — 70450 CT HEAD/BRAIN W/O DYE: CPT | Mod: 26,MA

## 2022-02-27 PROCEDURE — 72170 X-RAY EXAM OF PELVIS: CPT | Mod: 26

## 2022-02-27 PROCEDURE — 99223 1ST HOSP IP/OBS HIGH 75: CPT

## 2022-02-27 PROCEDURE — 71045 X-RAY EXAM CHEST 1 VIEW: CPT | Mod: 26

## 2022-02-27 PROCEDURE — 73030 X-RAY EXAM OF SHOULDER: CPT | Mod: 26,LT,76

## 2022-02-27 PROCEDURE — 73590 X-RAY EXAM OF LOWER LEG: CPT | Mod: 26,LT

## 2022-02-27 PROCEDURE — 93010 ELECTROCARDIOGRAM REPORT: CPT | Mod: 59

## 2022-02-27 RX ORDER — RUFINAMIDE 40 MG/ML
300 SUSPENSION ORAL
Refills: 0 | Status: DISCONTINUED | OUTPATIENT
Start: 2022-02-27 | End: 2022-03-01

## 2022-02-27 RX ORDER — RUFINAMIDE 40 MG/ML
400 SUSPENSION ORAL
Refills: 0 | Status: DISCONTINUED | OUTPATIENT
Start: 2022-02-27 | End: 2022-03-01

## 2022-02-27 RX ORDER — MORPHINE SULFATE 50 MG/1
2 CAPSULE, EXTENDED RELEASE ORAL ONCE
Refills: 0 | Status: DISCONTINUED | OUTPATIENT
Start: 2022-02-27 | End: 2022-02-27

## 2022-02-27 RX ADMIN — MORPHINE SULFATE 2 MILLIGRAM(S): 50 CAPSULE, EXTENDED RELEASE ORAL at 17:52

## 2022-02-27 RX ADMIN — MORPHINE SULFATE 2 MILLIGRAM(S): 50 CAPSULE, EXTENDED RELEASE ORAL at 17:29

## 2022-02-27 RX ADMIN — RUFINAMIDE 400 MILLIGRAM(S): 40 SUSPENSION ORAL at 20:52

## 2022-02-27 NOTE — ED ADULT NURSE NOTE - NSIMPLEMENTINTERV_GEN_ALL_ED
Implemented All Fall with Harm Risk Interventions:  [place] to call system. Call bell, personal items and telephone within reach. Instruct patient to call for assistance. Room bathroom lighting operational. Non-slip footwear when patient is off stretcher. Physically safe environment: no spills, clutter or unnecessary equipment. Stretcher in lowest position, wheels locked, appropriate side rails in place. Provide visual cue, wrist band, yellow gown, etc. Monitor gait and stability. Monitor for mental status changes and reorient to person, place, and time. Review medications for side effects contributing to fall risk. Reinforce activity limits and safety measures with patient and family. Provide visual clues: red socks.

## 2022-02-27 NOTE — H&P ADULT - PROBLEM SELECTOR PLAN 2
S/P reduction by ED attending in the ER>>orthopaedics contacted by examiner for post reduction recommendations.  Patient LUE in sling. Presumed mechanical fall, but ictal episode(?).  Tele.  Echo.

## 2022-02-27 NOTE — ED PROVIDER NOTE - PROGRESS NOTE DETAILS
Aldo-[id number]: called to bedside by RN, LUE noted to be cool with absent pulses. Intraarticular lidocaine injected and joint emergently reduced, post-reduction pulses intact, LUE warm, dry, well perfused. Pending postreduction XR. spoke w/ Dr. jose peters (radiologist- pts son) who reports that he wouldn't obtain CT angio of the shoulder, as the patient is w/ multiple allergies, pt w/ intact pulse at this time, intact sensation at this time, intact motor strength, discussion w/ orthopedics who also agrees w/ no need for angio at this time given intact pulse

## 2022-02-27 NOTE — ED ADULT NURSE NOTE - OBJECTIVE STATEMENT
88 yo female BIB EMS from home c/o shoulder pain s/p fall. Patient experienced a mechanical trip and fall 89 yo female BIB EMS from home c/o shoulder pain s/p fall. Patient experienced a mechanical trip and fall. Patient landed on right side per , c/p pain to left shoulder and left lower extremity pain. Patient denies hitting head, denies LOC/head/neck/back pain. Patient presents with obvious deformity to left shoulder. Pulses present on arrival with normal color condition and temp. Patient exposed, placed in gown, xray obtained. Patient exhibits increased pain, left radial pulse not present and hand significantly colder than right hand. Patient administered pain medication, MD bedside and medicated with lidocaine. Shoulder reduced, pulses return and skin color and temp improve. Patient expresses relief, VS stable. Repeat xray pending.

## 2022-02-27 NOTE — ED PROVIDER NOTE - WR ORDER NAME 4
Ms. Trudi Mcknight is a 66 year old female presented to the ED 1/2/22 with intermittent gas pains on her chest off and on for a few weeks.  She has also been having right upper quadrant and right rib pain for a couple of months.She was noted to have rapid irregular rhythm and EKG revealed AF with RVR. Her Echo from 1/10/2022 showed EF 55%, and normal RV function and no significant VHD. Reviewing her ECG on 11/2021 showed SR, and first reported atrial fibrillation w RVR and subsequent ECG on same day showed sinus rhythm w rate of 71 BPM. She underwent Holter monitor (1/5/22)which sowed atrial fibrillation and conversion pause ~ 4.1 seconds at 2300. HR did range from 37bpm- 175bpm. She reports having episodes of dizziness and near syncope when Hrs drop as low at 30s. This admission 1/17/2022, her presentation is chest pain in the setting of having atrial fibrillation with rapid ventricular response ~ 130 BPM. She has three sets of Trop which were negative. Cardiology consulted for her atrial fibrillation. She felt his conversion pauses (one of which occurred during the Holter monitor). Difficult for her tot tolerate AV vika blocking agents for rate control given drop in Hrs.       Xray Shoulder 2 Views, Left

## 2022-02-27 NOTE — ED PROVIDER NOTE - NS ED ROS FT
Review of Systems    Constitutional: (-) fever, (-) chills, (-) fatigue  Cardiovascular: (-) chest pain, (+) syncope  Respiratory: (-) cough, (-) shortness of breath  Gastrointestinal: (-) vomiting, (-) diarrhea, (-) abdominal pain  Musculoskeletal: (-) neck pain, (-) back pain, (+) shoulder pain  Integumentary: (-) rash, (-) edema, (-) wound  Neurological: (-) headache, (-) altered mental status    Except as documented in the HPI, all other systems are negative.

## 2022-02-27 NOTE — ED PROVIDER NOTE - ATTENDING CONTRIBUTION TO CARE
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 Pt states she was sitting and then was on the ground. + LOC,  states he saw pt sitting in chair and he told her to stay still and then pt was found by  on the ground when he came out from the bathroom, Pt was unable to get up. Concern for L ankle injury and L shoulder injury. Pt w/ no signs of head trauma, no 89 F w/ pmh of petit mal seizures, on rufinamide presents to the ER w/ L shoulder and L ankle pain. Pt states she was sitting and then was on the ground. + LOC,  states he saw pt sitting in chair and he told her to stay still and then pt was found by  on the ground when he came out from the bathroom, Pt was unable to get up. Concern for L ankle injury and L shoulder injury. Pt w/ no signs of head trauma.   Const: appearing stated age, no acute distress  Head: atraumatic, normocephalic  Eyes: no conjunctival injection and no scleral icterus  ENMT: Atraumatic external nose and ears, Moist mucus membranes  Neck: Symmetric, trachea midline, no c spine tenderness  BACK: no bruising, no midline t/l spine tenderness  CVS: +S1/S2, dorsalis pedis/radial pulse 2+ bilaterally  RESP: Unlabored respiratory effort, Clear to auscultation bilaterally  GI: Nontender/Nondistended, soft abdomen  MSK: L shoulder w/ deformity, no pelvis instability, pain in the L ankle, tenderness on the lateral malleolus no deformity to the RUE RLE   Skin: Warm, Dry w/ no rashes  Neuro: GCS=15, pt able to move bilateral arms and legs w/ pain in the R shoulder  Psych: Awake, Alert, & Orientedx3;  Appropriate mood and affect, cooperative  while in the ER pt lost pulse to the L radial, she had a dislocation, of the shoulder, xray shot and had reduction in the ER. has pain in the L ankle, concern for fx vs dislocation, plan for imaging ekg and admission for inability to ambulate

## 2022-02-27 NOTE — H&P ADULT - NSHPADDITIONALINFOADULT_GEN_ALL_CORE
NIGHT HOSPITALIST:   Patient/ spouse/son aware of course and agree with plan/care as above.   Given patient's comorbidities, patient's long term prognosis is guarded.   Emotional support provided to patient/ spouse.   The spouse is not yet ready to discuss advance directives.   Care reviewed with covering NP/PA for endorsement to the [employer] [facility] [employer].    Ghanshyam Rosenbaum MD  [phone]  Available on [employer] Teams.

## 2022-02-27 NOTE — H&P ADULT - PROBLEM SELECTOR PLAN 3
See above.  ON seizure Rx.   Would consider formal neurology evaluation, Dr. Schoenberg, in the AM. S/P reduction by ED attending in the ER>>orthopaedics contacted by examiner for post reduction recommendations.  Patient LUE in sling.

## 2022-02-27 NOTE — H&P ADULT - PROBLEM SELECTOR PLAN 1
Presumed mechanical fall, but ictal episode(?).  Tele.  Echo. See above.   CTT chest ordered to clarify RUL finding on CTT cervical spine.

## 2022-02-27 NOTE — ED PROVIDER NOTE - CLINICAL SUMMARY MEDICAL DECISION MAKING FREE TEXT BOX
R hand dominant L shoulder dislocation- reduced,- nonweightbearing in the LUE, keep immobilizer on  L ankle pain - concern for ankle sprain --> placed in air cast  fall vs syncope vs seizure - hx of seizure here w/ episode where pt fell off chair and landed on ground, no cp, no sob, no tongue biting,  found pt on the ground and here aaox2-3 (hx of dementia) head ct negative,

## 2022-02-27 NOTE — H&P ADULT - MENTAL STATUS
Alert, oriented to person/ hospital.   Impaired short term recall    Interactive with examiner with no need for prompting.

## 2022-02-27 NOTE — H&P ADULT - NSHPREVIEWOFSYSTEMS_GEN_ALL_CORE
ROS from spouse, partially from patient.    NO fever, no chills, no rigors.  NO chest pain/pressure.  NO palpitations.  NO cough, no dyspnoea, no wheezing.  NO back pain, no tearing back pain.  NO rash.    NO breast symptoms.  NO vaginal bleeding.  NO dysuria, no hematuria.  NO anorexia or weight loss  NO SI/HI>

## 2022-02-27 NOTE — ED PROVIDER NOTE - PHYSICAL EXAMINATION
CONSTITUTIONAL: non-toxic, well appearing  SKIN: no rash, no petechiae.  EYES: PERRL, EOMI, pink conjunctiva, anicteric  HEENT: NC/AT. Tongue and uvular midline, no exudates, moist mucous membranes  NECK: Supple; no meningismus, no JVD  CARD: RRR, no murmurs, equal radial pulses bilaterally 2+  RESP: CTAB, no respiratory distress  ABD: Soft, non-tender, non-distended, no peritoneal signs  EXT: Left shoulder deformity and left ankle deformity. Left ankle tender over lateral malleolus. Skin intact.   NEURO: Alert, oriented. Neuro exam nonfocal.  PSYCH: Cooperative, appropriate.

## 2022-02-27 NOTE — H&P ADULT - PROBLEM SELECTOR PLAN 4
See above.  Enhanced supervision.  Aspiration precautions. See above.  ON seizure Rx.   Would consider formal neurology evaluation, Dr. Schoenberg, in the AM.

## 2022-02-27 NOTE — H&P ADULT - NSHPSOURCEINFOTX_GEN_ALL_CORE
History from patient, spouse Johnny Schwartz, by phone .   Patient's son, Arnie Schwartz MD (radiology) (cell) [phone] contacted by ED physicians--the patient/spouse did not wish examiner to contact son at this hour.

## 2022-02-27 NOTE — H&P ADULT - ASSESSMENT
NIGHT HOSPITALIST:   Presumed mechanical fall but unwitnessed but with S/P reduction of the LEFT shoulder in the ER.  Examiner contacted orthopaedics for post reduction recommendations.   LEFT ankle clinically and radiographically with no evidence of fracture.      Spouse reports that the patient received dupilumab for eczema but unclear if this precipitated a possible ictal episode>>would consider formal evaluation by her neurologist in the AM, with unclear of the possibility of an ictal episode.    Admitted to telemetry.   Echo.    Will obtain a HgbA1C with elevated random glucose.    Spouse agrees to pharmacologic DVT prophylaxis.

## 2022-02-27 NOTE — ED PROVIDER NOTE - OBJECTIVE STATEMENT
89 year old female with PMH seizures on rufinamide presents with left shoulder and left ankle pain s/p fall. Pt cannot recall mechanism, states she was sitting in chair and then noted to be on the ground with severe left shoulder pain, unable to ambulate. Denies any fevers, chest pain, shortness of breath, abdominal pain, vomiting, diarrhea, bloody stools, black tarry stools, dysuria, headache, vision change, numbness, weakness, or rash. Denies any aspirin or AC use. Denies any additional complaints.

## 2022-02-27 NOTE — ED PROCEDURE NOTE - PROCEDURE ADDITIONAL DETAILS
Absent pulses prior to reduction, intraarticular lidocaine injected and joint emergently reduced, post-reduction pulses intact, LUE warm, dry, well perfused

## 2022-02-27 NOTE — H&P ADULT - NSHPLABSRESULTS_GEN_ALL_CORE
WBC 12.2  NORMAL differential.    Hgb 12.5    Platelets of 285K>    Random glucose of 176    Cr 0.7  K+ 4.1    COVID-19 PCR>>negative.    CTT head negative.  CTT cervical spine with incidental 7 mm RUL opacity>>CTT chest ordered.    Pelvis, LEFT tib/fib, LEFT foot and LEFT ankle with NO fracture.    S/P reduction of LEFT humeral dislocation.    Chest radiograph with initial LEFT humeral dislocation with Hill Sacks lesion.   NO acute infiltrate or effusion.    EKG tracing reviewed with NSR at 92.

## 2022-02-27 NOTE — H&P ADULT - EXTREMITIES COMMENTS
LEFT upper extremity in sling S/P reduction by the ER attending.  LEFT ankle with ankle splint.   Full pulses, LEFT ankle with NO tenderness and limitation of motion.

## 2022-02-27 NOTE — H&P ADULT - NSHPSOCIALHISTORY_GEN_ALL_CORE
NO tobacco or ethanol or recreational substance use.   Supportive dedicated spouse.   Supportive children as above.

## 2022-02-27 NOTE — H&P ADULT - HISTORY OF PRESENT ILLNESS
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16, seizure disorder (last in Feb 2020) maintained on rufinamide, with an admission and discharge from [place] in Sept 2020 for cystitis, with spouse reporting that both the patient and himself have been at an assisted living facility in [place] for the past 3 months, with patient closely cared for by the patient and the staff in [place], with the patient and spouse returning to [place] with the patient's son (who is a radiologist in [place]), daughters, with the patient apparently with an unwitnessed fall following patient attempting to (?) reach for her walker (fall unwitnessed) with patient complaining of severe LEFT shoulder pain and LEFT ankle pain.  Patient unable to recall events leading to the fall.  Denies HA, focal weakness.  Denies LOC.  No faecal or urinary incontinence reported by spouse. NO fever, no chills, no rigors.  Patient was noted to have a dislocated LEFT shoulder in the ER with vascular compromise and emergently reduced by the ER attending.  LEFT ankle in splint but patient denies LEFT shoulder or ankle pain at present.    Spouse reports patient received the COVID-19 Pfizer vaccine series x 3. 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16, seizure disorder (last in Feb 2020) maintained on rufinamide, with an admission and discharge from [place] in Sept 2020 for cystitis, with spouse reporting that both the patient and himself have been at an assisted living facility in [place] for the past 3 months, with patient closely cared for by the patient and the staff in [place], with the patient and spouse returning to [place] with the patient's son (who is a radiologist in [place]), daughters, with the patient apparently with an unwitnessed fall following patient attempting to (?) reach for her walker (fall unwitnessed) with patient complaining of severe LEFT shoulder pain and LEFT ankle pain.  Spouse reports that the patient received dupilumab for eczema in [place] recently.  Patient unable to recall events leading to the fall.  Denies HA, focal weakness.  Denies LOC.  No faecal or urinary incontinence reported by spouse. NO fever, no chills, no rigors.  Patient was noted to have a dislocated LEFT shoulder in the ER with vascular compromise and emergently reduced by the ER attending.  LEFT ankle in splint but patient denies LEFT shoulder or ankle pain at present.    Spouse reports patient received the COVID-19 Pfizer vaccine series x 3.

## 2022-02-28 DIAGNOSIS — R91.8 OTHER NONSPECIFIC ABNORMAL FINDING OF LUNG FIELD: ICD-10-CM

## 2022-02-28 LAB
A1C WITH ESTIMATED AVERAGE GLUCOSE RESULT: 5.5 % — SIGNIFICANT CHANGE UP (ref 4–5.6)
ANION GAP SERPL CALC-SCNC: 15 MMOL/L — SIGNIFICANT CHANGE UP (ref 5–17)
BASOPHILS # BLD AUTO: 0.06 K/UL — SIGNIFICANT CHANGE UP (ref 0–0.2)
BASOPHILS NFR BLD AUTO: 0.7 % — SIGNIFICANT CHANGE UP (ref 0–2)
BUN SERPL-MCNC: 22 MG/DL — SIGNIFICANT CHANGE UP (ref 7–23)
CALCIUM SERPL-MCNC: 9.4 MG/DL — SIGNIFICANT CHANGE UP (ref 8.4–10.5)
CHLORIDE SERPL-SCNC: 108 MMOL/L — SIGNIFICANT CHANGE UP (ref 96–108)
CO2 SERPL-SCNC: 23 MMOL/L — SIGNIFICANT CHANGE UP (ref 22–31)
CREAT SERPL-MCNC: 0.69 MG/DL — SIGNIFICANT CHANGE UP (ref 0.5–1.3)
EOSINOPHIL # BLD AUTO: 0.12 K/UL — SIGNIFICANT CHANGE UP (ref 0–0.5)
EOSINOPHIL NFR BLD AUTO: 1.3 % — SIGNIFICANT CHANGE UP (ref 0–6)
ESTIMATED AVERAGE GLUCOSE: 111 MG/DL — SIGNIFICANT CHANGE UP (ref 68–114)
GLUCOSE SERPL-MCNC: 110 MG/DL — HIGH (ref 70–99)
HCT VFR BLD CALC: 34.3 % — LOW (ref 34.5–45)
HGB BLD-MCNC: 11.1 G/DL — LOW (ref 11.5–15.5)
IMM GRANULOCYTES NFR BLD AUTO: 0.7 % — SIGNIFICANT CHANGE UP (ref 0–1.5)
LYMPHOCYTES # BLD AUTO: 0.71 K/UL — LOW (ref 1–3.3)
LYMPHOCYTES # BLD AUTO: 7.9 % — LOW (ref 13–44)
MCHC RBC-ENTMCNC: 30.2 PG — SIGNIFICANT CHANGE UP (ref 27–34)
MCHC RBC-ENTMCNC: 32.4 GM/DL — SIGNIFICANT CHANGE UP (ref 32–36)
MCV RBC AUTO: 93.5 FL — SIGNIFICANT CHANGE UP (ref 80–100)
MONOCYTES # BLD AUTO: 0.81 K/UL — SIGNIFICANT CHANGE UP (ref 0–0.9)
MONOCYTES NFR BLD AUTO: 9 % — SIGNIFICANT CHANGE UP (ref 2–14)
NEUTROPHILS # BLD AUTO: 7.22 K/UL — SIGNIFICANT CHANGE UP (ref 1.8–7.4)
NEUTROPHILS NFR BLD AUTO: 80.4 % — HIGH (ref 43–77)
NRBC # BLD: 0 /100 WBCS — SIGNIFICANT CHANGE UP (ref 0–0)
PLATELET # BLD AUTO: 213 K/UL — SIGNIFICANT CHANGE UP (ref 150–400)
POTASSIUM SERPL-MCNC: 4.1 MMOL/L — SIGNIFICANT CHANGE UP (ref 3.5–5.3)
POTASSIUM SERPL-SCNC: 4.1 MMOL/L — SIGNIFICANT CHANGE UP (ref 3.5–5.3)
RBC # BLD: 3.67 M/UL — LOW (ref 3.8–5.2)
RBC # FLD: 13.9 % — SIGNIFICANT CHANGE UP (ref 10.3–14.5)
SODIUM SERPL-SCNC: 146 MMOL/L — HIGH (ref 135–145)
WBC # BLD: 8.98 K/UL — SIGNIFICANT CHANGE UP (ref 3.8–10.5)
WBC # FLD AUTO: 8.98 K/UL — SIGNIFICANT CHANGE UP (ref 3.8–10.5)

## 2022-02-28 PROCEDURE — 76377 3D RENDER W/INTRP POSTPROCES: CPT | Mod: 26

## 2022-02-28 PROCEDURE — 71250 CT THORAX DX C-: CPT | Mod: 26

## 2022-02-28 PROCEDURE — 73200 CT UPPER EXTREMITY W/O DYE: CPT | Mod: 26,LT

## 2022-02-28 PROCEDURE — 93306 TTE W/DOPPLER COMPLETE: CPT | Mod: 26

## 2022-02-28 RX ORDER — SODIUM CHLORIDE 9 MG/ML
1000 INJECTION INTRAMUSCULAR; INTRAVENOUS; SUBCUTANEOUS
Refills: 0 | Status: DISCONTINUED | OUTPATIENT
Start: 2022-02-28 | End: 2022-03-01

## 2022-02-28 RX ORDER — HEPARIN SODIUM 5000 [USP'U]/ML
5000 INJECTION INTRAVENOUS; SUBCUTANEOUS EVERY 12 HOURS
Refills: 0 | Status: DISCONTINUED | OUTPATIENT
Start: 2022-02-28 | End: 2022-03-01

## 2022-02-28 RX ORDER — ACETAMINOPHEN 500 MG
650 TABLET ORAL EVERY 6 HOURS
Refills: 0 | Status: DISCONTINUED | OUTPATIENT
Start: 2022-02-28 | End: 2022-03-01

## 2022-02-28 RX ORDER — RUFINAMIDE 40 MG/ML
0 SUSPENSION ORAL
Qty: 0 | Refills: 0 | DISCHARGE

## 2022-02-28 RX ORDER — RUFINAMIDE 40 MG/ML
300 SUSPENSION ORAL
Qty: 0 | Refills: 0 | DISCHARGE

## 2022-02-28 RX ADMIN — HEPARIN SODIUM 5000 UNIT(S): 5000 INJECTION INTRAVENOUS; SUBCUTANEOUS at 05:40

## 2022-02-28 RX ADMIN — RUFINAMIDE 400 MILLIGRAM(S): 40 SUSPENSION ORAL at 20:05

## 2022-02-28 RX ADMIN — SODIUM CHLORIDE 75 MILLILITER(S): 9 INJECTION INTRAMUSCULAR; INTRAVENOUS; SUBCUTANEOUS at 17:10

## 2022-02-28 RX ADMIN — SODIUM CHLORIDE 75 MILLILITER(S): 9 INJECTION INTRAMUSCULAR; INTRAVENOUS; SUBCUTANEOUS at 14:14

## 2022-02-28 RX ADMIN — RUFINAMIDE 300 MILLIGRAM(S): 40 SUSPENSION ORAL at 10:51

## 2022-02-28 RX ADMIN — HEPARIN SODIUM 5000 UNIT(S): 5000 INJECTION INTRAVENOUS; SUBCUTANEOUS at 17:10

## 2022-02-28 NOTE — PATIENT PROFILE ADULT - FALL HARM RISK - HARM RISK INTERVENTIONS

## 2022-02-28 NOTE — PROGRESS NOTE ADULT - SUBJECTIVE AND OBJECTIVE BOX
Patient is a 89y old  Female who presents with a chief complaint of S/P unwitnessed fall at home with LEFT shoulder and LEFT ankle pain (28 Feb 2022 01:58)      INTERVAL HPI/OVERNIGHT EVENTS: noted  pt seen and examined this am   events noted  lt shoulder pain  denies  dizziness      Vital Signs Last 24 Hrs  T(C): 36.9 (28 Feb 2022 07:58), Max: 97.9 (27 Feb 2022 18:41)  T(F): 98.4 (28 Feb 2022 07:58), Max: 208.2 (27 Feb 2022 18:41)  HR: 105 (28 Feb 2022 05:05) (88 - 105)  BP: 132/71 (28 Feb 2022 05:05) (132/71 - 166/86)  BP(mean): --  RR: 16 (28 Feb 2022 07:58) (16 - 20)  SpO2: 95% (28 Feb 2022 07:58) (95% - 99%)    acetaminophen     Tablet .. 650 milliGRAM(s) Oral every 6 hours PRN  heparin   Injectable 5000 Unit(s) SubCutaneous every 12 hours  rufinamide 400 milliGRAM(s) Oral <User Schedule>  rufinamide 300 milliGRAM(s) Oral <User Schedule>      PHYSICAL EXAM:  GENERAL: NAD,   EYES: conjunctiva and sclera clear  ENMT: Moist mucous membranes  NECK: Supple, No JVD, Normal thyroid  CHEST/LUNG: non labored, cta b/l  HEART: Regular rate and rhythm; No murmurs, rubs, or gallops  ABDOMEN: Soft, Nontender, Nondistended; Bowel sounds present  EXTREMITIES:  2+ Peripheral Pulses, No clubbing, cyanosis, or edema  LYMPH: No lymphadenopathy noted  SKIN: No rashes or lesions    Consultant(s) Notes Reviewed:  [x ] YES  [ ] NO  Care Discussed with Consultants/Other Providers [ x] YES  [ ] NO    LABS:                        11.1   8.98  )-----------( 213      ( 28 Feb 2022 06:29 )             34.3     02-28    146<H>  |  108  |  22  ----------------------------<  110<H>  4.1   |  23  |  0.69    Ca    9.4      28 Feb 2022 06:29    TPro  7.3  /  Alb  4.0  /  TBili  0.2  /  DBili  x   /  AST  16  /  ALT  13  /  AlkPhos  99  02-27    PT/INR - ( 27 Feb 2022 17:20 )   PT: 12.7 sec;   INR: 1.06 ratio         PTT - ( 27 Feb 2022 17:20 )  PTT:24.1 sec    CAPILLARY BLOOD GLUCOSE                  RADIOLOGY & ADDITIONAL TESTS:    Imaging Personally Reviewed:  [x ] YES  [ ] NO

## 2022-02-28 NOTE — PROGRESS NOTE ADULT - ASSESSMENT
89f mild dementia, seizure dz( absence seizures) , eczema   aw sp fall unwitnessed  r/o syncope/seizure  Lt shoulder dislocation  CT shoulder-  Acute mildly displaced fracture along the anteroinferior aspect of the   glenoid. Shallow impaction fracture along the superolateral aspect of the   humeral head/greater tuberosity.

## 2022-02-28 NOTE — PROGRESS NOTE ADULT - PROBLEM SELECTOR PLAN 3
S/P reduction by ED attending in the ER   Patient LUE in sling.  ortho eval noted  ct shoulder findings noted  ortho reeval noted- no furthur interventions  outpt fu

## 2022-02-28 NOTE — PROGRESS NOTE ADULT - PROBLEM SELECTOR PLAN 7
DVT prophylaxis.    d/w Dr peters- pt's son on the phone and explained pt's clinical status, wants pt to be dced katharina  explained that pt is at fall risk we are awaiting, improved orthostatics,  PT eval and need for home PT to ensure safe discharge

## 2022-02-28 NOTE — CONSULT NOTE ADULT - SUBJECTIVE AND OBJECTIVE BOX
89y Female presents with complaint of LEFT shoulder pain s/p mechanical fall. Denies numbness/tingling in affected extremity. Denies headstrike/LOC/other orthopedic injuries at this time. Able to ambulate after fall.     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    Dementia    Cataract extraction status, right  2011      MEDICATIONS  (STANDING):  heparin   Injectable 5000 Unit(s) SubCutaneous every 12 hours  rufinamide 400 milliGRAM(s) Oral <User Schedule>  rufinamide 300 milliGRAM(s) Oral <User Schedule>    Allergies     reports severe allergy to PEAS and NUTS (Hives)  Nuts (Hives)  penicillin (Unknown)    Intolerances                              12.5   12.21 )-----------( 285      ( 27 Feb 2022 17:20 )             38.4     27 Feb 2022 17:20    142    |  105    |  28     ----------------------------<  176    4.1     |  20     |  0.76     Ca    9.7        27 Feb 2022 17:20    TPro  7.3    /  Alb  4.0    /  TBili  0.2    /  DBili  x      /  AST  16     /  ALT  13     /  AlkPhos  99     27 Feb 2022 17:20    PT/INR - ( 27 Feb 2022 17:20 )   PT: 12.7 sec;   INR: 1.06 ratio         PTT - ( 27 Feb 2022 17:20 )  PTT:24.1 sec    Vital Signs Last 24 Hrs  T(C): 36.7 (02-28-22 @ 00:00), Max: 97.9 (02-27-22 @ 18:41)  T(F): 98 (02-28-22 @ 00:00), Max: 208.2 (02-27-22 @ 18:41)  HR: 94 (02-28-22 @ 00:00) (88 - 99)  BP: 145/76 (02-28-22 @ 00:00) (137/75 - 166/86)  BP(mean): --  RR: 19 (02-28-22 @ 00:00) (18 - 20)  SpO2: 99% (02-28-22 @ 00:00) (96% - 99%)    PHYSICAL EXAM  General: NAD, Awake and Alert    LEFT Upper Extremity:  Skin intact   + Anterior shoulder fullness  TTP over the proximal humerus  Unable to range shoulder 2/2 pain  + AIN/PIN/Median/Ulnar/Radial/Musculocutaneous nerves intact  SILT C5-T1  + Radial pulse, brisk capillary refill  Compartments soft and compressible      Imaging:  XR LEFT Shoulder: shows left shoulder hill sachs lesion s/p closed reduction              A/P: 89y Female with LEFT shoulder dislocation, s/p closed reduction     -XR ankle negative for fx- WBAT- likely ankle sprain may use aircast for comfort  -Pain control  -NWB LEFT upper extremity in shoulder immobilizer  -Ice as needed  -Active movement of fingers/wrist/elbow encouraged  -ROM of shoulder restricted until advanced by orthopedic attending in office in 10 days  -No acute/emergent orthopedic surgical intervention needed at this time  -Follow up outpatient with Dr. Worthington within 1 week-10 days, please call office for appointment  -Ortho stable   -discussed w pt who agrees all questions answered

## 2022-02-28 NOTE — CHART NOTE - NSCHARTNOTEFT_GEN_A_CORE
CT of L shoulder noted: showing associated Hill-sachs and bony bankart lesions s/p shoulder dislocation  No change in intervention - recommend NWB LUE in SI at all times  -No acute orthopaedic surgical intervention at this time, please re-consult as needed  -Ortho Stable for DC  -Case discussed with attending who agrees with above plan

## 2022-02-28 NOTE — PHYSICAL THERAPY INITIAL EVALUATION ADULT - PERTINENT HX OF CURRENT PROBLEM, REHAB EVAL
CT HEAD:No intracranial hemorrhage, mass effect, or midline shift CT CERVICAL SPINE:No acute fracture or traumatic malalignment. Nonspecific 7 mm peripheral opacity RUL; XRAYS : L Foot/Ankle, L tibia and Fibula, pelvis, w/o acute fx and XRAY L shoulder and humerus w/ anterior dislocation w/ Hill Sachs Lesion reloctaed in ED; COVID19 (not detected) 2/27/22; pt severe allergy to PEAS ad NUTS and has allergy to Penic[place]an CT HEAD:No intracranial hemorrhage, mass effect, or midline shift CT CERVICAL SPINE:No acute fracture or traumatic malalignment. Nonspecific 7 mm peripheral opacity RUL; XRAYS ( PRELIMINARY REPORTS) : L Foot/Ankle, L tibia and Fibula, pelvis, w/o acute fx and XRAY L shoulder and humerus w/ anterior dislocation w/ Hill Sachs Lesion reloctaed in ED; COVID19 (not detected) 2/27/22; pt severe allergy to PEAS ad NUTS and has allergy to Penicillan

## 2022-02-28 NOTE — PROGRESS NOTE ADULT - PROBLEM SELECTOR PLAN 2
presumed mech fall  +orthostatics  gentle ivf, check orthostatics qshift  unlikely seizure- pt's  and son ( Dr peters)- insists this is not her usual seizure presentation  no postictal state

## 2022-02-28 NOTE — PHYSICAL THERAPY INITIAL EVALUATION ADULT - REFERRING PHYSICIAN, REHAB EVAL
Margareth Crandall MD ORDER PT EVAL AND TREAT , OOB-chair Margareth Crandall MD ORDER PT EVAL AND TREAT , OOB-chair, NWB LUE and WBAT L LE ,MUST have sling LUE, per ORTHO WBAT L LE may use aircast fot comfort ,AROM L fingers/wrist/elbow encouraged , ROM shoulder restricted until advances by orthopedic attending in office in 10 days

## 2022-03-01 ENCOUNTER — TRANSCRIPTION ENCOUNTER (OUTPATIENT)
Age: 87
End: 2022-03-01

## 2022-03-01 VITALS
HEART RATE: 104 BPM | OXYGEN SATURATION: 94 % | DIASTOLIC BLOOD PRESSURE: 64 MMHG | TEMPERATURE: 99 F | SYSTOLIC BLOOD PRESSURE: 102 MMHG | RESPIRATION RATE: 18 BRPM

## 2022-03-01 LAB
ANION GAP SERPL CALC-SCNC: 11 MMOL/L — SIGNIFICANT CHANGE UP (ref 5–17)
BUN SERPL-MCNC: 19 MG/DL — SIGNIFICANT CHANGE UP (ref 7–23)
CALCIUM SERPL-MCNC: 9.4 MG/DL — SIGNIFICANT CHANGE UP (ref 8.4–10.5)
CHLORIDE SERPL-SCNC: 104 MMOL/L — SIGNIFICANT CHANGE UP (ref 96–108)
CO2 SERPL-SCNC: 25 MMOL/L — SIGNIFICANT CHANGE UP (ref 22–31)
CREAT SERPL-MCNC: 0.67 MG/DL — SIGNIFICANT CHANGE UP (ref 0.5–1.3)
EGFR: 84 ML/MIN/1.73M2 — SIGNIFICANT CHANGE UP
GLUCOSE SERPL-MCNC: 118 MG/DL — HIGH (ref 70–99)
HCT VFR BLD CALC: 36.7 % — SIGNIFICANT CHANGE UP (ref 34.5–45)
HGB BLD-MCNC: 12 G/DL — SIGNIFICANT CHANGE UP (ref 11.5–15.5)
MCHC RBC-ENTMCNC: 30.2 PG — SIGNIFICANT CHANGE UP (ref 27–34)
MCHC RBC-ENTMCNC: 32.7 GM/DL — SIGNIFICANT CHANGE UP (ref 32–36)
MCV RBC AUTO: 92.4 FL — SIGNIFICANT CHANGE UP (ref 80–100)
NRBC # BLD: 0 /100 WBCS — SIGNIFICANT CHANGE UP (ref 0–0)
PLATELET # BLD AUTO: 215 K/UL — SIGNIFICANT CHANGE UP (ref 150–400)
POTASSIUM SERPL-MCNC: 3.7 MMOL/L — SIGNIFICANT CHANGE UP (ref 3.5–5.3)
POTASSIUM SERPL-SCNC: 3.7 MMOL/L — SIGNIFICANT CHANGE UP (ref 3.5–5.3)
RBC # BLD: 3.97 M/UL — SIGNIFICANT CHANGE UP (ref 3.8–5.2)
RBC # FLD: 13.5 % — SIGNIFICANT CHANGE UP (ref 10.3–14.5)
SODIUM SERPL-SCNC: 140 MMOL/L — SIGNIFICANT CHANGE UP (ref 135–145)
WBC # BLD: 10.26 K/UL — SIGNIFICANT CHANGE UP (ref 3.8–10.5)
WBC # FLD AUTO: 10.26 K/UL — SIGNIFICANT CHANGE UP (ref 3.8–10.5)

## 2022-03-01 PROCEDURE — 76377 3D RENDER W/INTRP POSTPROCES: CPT

## 2022-03-01 PROCEDURE — 85610 PROTHROMBIN TIME: CPT

## 2022-03-01 PROCEDURE — 71045 X-RAY EXAM CHEST 1 VIEW: CPT

## 2022-03-01 PROCEDURE — 36415 COLL VENOUS BLD VENIPUNCTURE: CPT

## 2022-03-01 PROCEDURE — U0003: CPT

## 2022-03-01 PROCEDURE — 73060 X-RAY EXAM OF HUMERUS: CPT

## 2022-03-01 PROCEDURE — 99285 EMERGENCY DEPT VISIT HI MDM: CPT | Mod: 25

## 2022-03-01 PROCEDURE — 73590 X-RAY EXAM OF LOWER LEG: CPT

## 2022-03-01 PROCEDURE — 85025 COMPLETE CBC W/AUTO DIFF WBC: CPT

## 2022-03-01 PROCEDURE — 80048 BASIC METABOLIC PNL TOTAL CA: CPT

## 2022-03-01 PROCEDURE — 73610 X-RAY EXAM OF ANKLE: CPT

## 2022-03-01 PROCEDURE — 23650 CLTX SHO DSLC W/MNPJ WO ANES: CPT

## 2022-03-01 PROCEDURE — 73020 X-RAY EXAM OF SHOULDER: CPT

## 2022-03-01 PROCEDURE — 73030 X-RAY EXAM OF SHOULDER: CPT

## 2022-03-01 PROCEDURE — 71250 CT THORAX DX C-: CPT

## 2022-03-01 PROCEDURE — 93306 TTE W/DOPPLER COMPLETE: CPT

## 2022-03-01 PROCEDURE — 72170 X-RAY EXAM OF PELVIS: CPT

## 2022-03-01 PROCEDURE — 85027 COMPLETE CBC AUTOMATED: CPT

## 2022-03-01 PROCEDURE — 93005 ELECTROCARDIOGRAM TRACING: CPT

## 2022-03-01 PROCEDURE — 73630 X-RAY EXAM OF FOOT: CPT

## 2022-03-01 PROCEDURE — 85730 THROMBOPLASTIN TIME PARTIAL: CPT

## 2022-03-01 PROCEDURE — U0005: CPT

## 2022-03-01 PROCEDURE — 96374 THER/PROPH/DIAG INJ IV PUSH: CPT

## 2022-03-01 PROCEDURE — 83036 HEMOGLOBIN GLYCOSYLATED A1C: CPT

## 2022-03-01 PROCEDURE — 73200 CT UPPER EXTREMITY W/O DYE: CPT

## 2022-03-01 PROCEDURE — 70450 CT HEAD/BRAIN W/O DYE: CPT | Mod: MA

## 2022-03-01 PROCEDURE — 72125 CT NECK SPINE W/O DYE: CPT | Mod: MA

## 2022-03-01 PROCEDURE — 80053 COMPREHEN METABOLIC PANEL: CPT

## 2022-03-01 RX ORDER — ACETAMINOPHEN 500 MG
650 TABLET ORAL EVERY 8 HOURS
Refills: 0 | Status: DISCONTINUED | OUTPATIENT
Start: 2022-03-01 | End: 2022-03-01

## 2022-03-01 RX ORDER — ACETAMINOPHEN 500 MG
2 TABLET ORAL
Qty: 0 | Refills: 0 | DISCHARGE
Start: 2022-03-01

## 2022-03-01 RX ADMIN — RUFINAMIDE 300 MILLIGRAM(S): 40 SUSPENSION ORAL at 09:20

## 2022-03-01 RX ADMIN — HEPARIN SODIUM 5000 UNIT(S): 5000 INJECTION INTRAVENOUS; SUBCUTANEOUS at 05:14

## 2022-03-01 NOTE — DISCHARGE NOTE PROVIDER - HOSPITAL COURSE
89y Female presents with complaint of LEFT shoulder pain s/p mechanical fall. Denies numbness/tingling in affected extremity. Denies headstrike/LOC/other orthopedic injuries at this time. Able to ambulate after fall.     LEFT shoulder dislocation, s/p closed reduction     -XR ankle negative for fx- WBAT- likely ankle sprain may use aircast for comfort  -Pain control  -NWB LEFT upper extremity in shoulder immobilizer  -Ice as needed  -Active movement of fingers/wrist/elbow encouraged  -ROM of shoulder restricted until advanced by orthopedic attending in office in 10 days  -No acute/emergent orthopedic surgical intervention needed at this time  -Follow up outpatient with Dr. Worthington within 1 week-10 days, please call office for appointment  -Ortho stable     D/C to home with HHA and PT.

## 2022-03-01 NOTE — DISCHARGE NOTE PROVIDER - NSDCFUSCHEDAPPT_GEN_ALL_CORE_FT
KANG VILLAFANA ; 03/03/2022 ; NPP OrthoSurg [address] [facility]  KANG VILLAFANA ; 04/01/2022 ; NPP Derm [address]

## 2022-03-01 NOTE — DIETITIAN INITIAL EVALUATION ADULT. - PERTINENT LABORATORY DATA
03-01 Na 140 mmol/L Glu 118 mg/dL<H> K+ 3.7 mmol/L Cr  0.67 mg/dL BUN 19 mg/dL Phos n/a   Alb n/a   PAB n/a   Hgb 12.0 g/dL Hct 36.7 %  A1C with Estimated Average Glucose Result: 5.5 % (02-28-22 @ 08:53)

## 2022-03-01 NOTE — DIETITIAN INITIAL EVALUATION ADULT. - ORAL INTAKE PTA/DIET HISTORY
visited pt at bedside this morning. states to be eating well PTA. confirms allergy to nuts and peas, states that she cannot breathe when she consumes them. no vitamins/minerals noted in outpatient medications.

## 2022-03-01 NOTE — DISCHARGE NOTE NURSING/CASE MANAGEMENT/SOCIAL WORK - NSDCPEFALRISK_GEN_ALL_CORE
For information on Fall & Injury Prevention, visit: [email or web address] OR  [email or web address] OR  https://www.cdc.gov/steadi/patient.html

## 2022-03-01 NOTE — DISCHARGE NOTE NURSING/CASE MANAGEMENT/SOCIAL WORK - PATIENT PORTAL LINK FT
You can access the FollowMyHealth Patient Portal offered by [facility] by registering at the following website: [email or web address]. By joining [facility]’s FollowMyHealth portal, you will also be able to view your health information using other applications (apps) compatible with our system.

## 2022-03-01 NOTE — DIETITIAN INITIAL EVALUATION ADULT. - CHIEF COMPLAINT
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

## 2022-03-01 NOTE — DIETITIAN INITIAL EVALUATION ADULT. - PROBLEM SELECTOR PLAN 3
S/P reduction by ED attending in the ER>>orthopaedics contacted by examiner for post reduction recommendations.  Patient LUE in sling.

## 2022-03-01 NOTE — DIETITIAN INITIAL EVALUATION ADULT. - PERTINENT MEDS FT
MEDICATIONS  (STANDING):  heparin   Injectable 5000 Unit(s) SubCutaneous every 12 hours  rufinamide 400 milliGRAM(s) Oral <User Schedule>  rufinamide 300 milliGRAM(s) Oral <User Schedule>  sodium chloride 0.9%. 1000 milliLiter(s) (75 mL/Hr) IV Continuous <Continuous>    MEDICATIONS  (PRN):  acetaminophen     Tablet .. 650 milliGRAM(s) Oral every 6 hours PRN Temp greater or equal to 38C (100.4F), Mild Pain (1 - 3)

## 2022-03-01 NOTE — DISCHARGE NOTE PROVIDER - CARE PROVIDER_API CALL
Nestor Worthington)  Orthopedics  [address] [place] [address]  Phone: [phone]  Fax: [phone]  Follow Up Time: 1 week

## 2022-03-01 NOTE — DIETITIAN INITIAL EVALUATION ADULT. - OTHER INFO
Pt states to have good PO intake. Observed pt consuming breakfast this morning. fair PO intake noted per flowsheets since admission. Denies nausea/vomiting/constipation/diarrhea. no noted difficulty chewing / swallowing on current diet. Last bowel movement 2/28per flow sheets.   Dosing weight: 104.7 pounds. Patient endorses UBW ~102 pounds. reports no known weight loss.    Weight per HIE:  9/21/20 94.8 pounds.

## 2022-03-01 NOTE — DISCHARGE NOTE PROVIDER - NSDCCPCAREPLAN_GEN_ALL_CORE_FT
PRINCIPAL DISCHARGE DIAGNOSIS  Diagnosis: Syncope  Assessment and Plan of Treatment: HOME CARE INSTRUCTIONS  Have someone stay with you until you feel stable.  Do not drive, operate machinery, or play sports until your caregiver says it is okay.  Keep all follow-up appointments as directed by your caregiver.   Lie down right away if you start feeling like you might faint. Breathe deeply and steadily. Wait until all the symptoms have passed.Drink enough fluids to keep your urine clear or pale yellow.  If you are taking blood pressure or heart medicine, get up slowly, taking several minutes to sit and then stand. This can reduce dizziness.  SEEK IMMEDIATE MEDICAL CARE IF:  You have a severe headache.  You have unusual pain in the chest, abdomen, or back.  You are bleeding from the mouth or rectum, or you have black or tarry stool.  You have an irregular or very fast heartbeat.  You have pain with breathing.  You have repeated fainting or seizure-like jerking during an episode.  You faint when sitting or lying down.  You have confusion.  You have difficulty walking.  You have severe weakness.  You have vision problems.  If you fainted, call your local emergency services. Do not drive yourself to the hospital

## 2022-03-01 NOTE — DISCHARGE NOTE PROVIDER - NSDCMRMEDTOKEN_GEN_ALL_CORE_FT
acetaminophen 325 mg oral tablet: 2 tab(s) orally every 6 hours, As needed, Temp greater or equal to 38C (100.4F), Mild Pain (1 - 3)  rufinamide 200 mg oral tablet: 300 milligram(s) orally once a day (in the morning)  rufinamide 400 mg oral tablet: orally once a day (in the evening)

## 2022-03-01 NOTE — DIETITIAN INITIAL EVALUATION ADULT. - ADD RECOMMEND
1) Will continue to monitor PO intake, weight, labs, skin, GI status and diet 2) Diet texture per team 3) RD to add mighty shake due to noted fair PO intake 4) Nutrition risk placed in chart

## 2022-03-01 NOTE — DISCHARGE NOTE NURSING/CASE MANAGEMENT/SOCIAL WORK - NSDCVIVACCINE_GEN_ALL_CORE_FT
Tdap; 29-Feb-2016 15:15; Luis Felipe Avalos (ELI); [employer]; [id number]; IntraMuscular; Deltoid Left.; 0.5 milliLiter(s); VIS (VIS Published: 09-May-2013, VIS Presented: 29-Feb-2016);

## 2022-03-07 ENCOUNTER — APPOINTMENT (OUTPATIENT)
Dept: ORTHOPEDIC SURGERY | Facility: CLINIC | Age: 87
End: 2022-03-07
Payer: MEDICARE

## 2022-03-07 VITALS
SYSTOLIC BLOOD PRESSURE: 128 MMHG | HEART RATE: 108 BPM | BODY MASS INDEX: 17.67 KG/M2 | HEIGHT: 60 IN | WEIGHT: 90 LBS | DIASTOLIC BLOOD PRESSURE: 74 MMHG

## 2022-03-07 PROCEDURE — 99203 OFFICE O/P NEW LOW 30 MIN: CPT

## 2022-03-07 PROCEDURE — 73030 X-RAY EXAM OF SHOULDER: CPT | Mod: LT

## 2022-03-07 NOTE — DISCUSSION/SUMMARY
[de-identified] : The patient sustained a left shoulder dislocation.  It is now located.  She has a Hill-Sachs lesion and possible small bony Bankart.  Treatment options were discussed.  I think this is best treated nonoperatively.  She will be maintained in a shoulder immobilizer for an additional 3 weeks.  She will be reevaluated at that time.

## 2022-03-07 NOTE — PHYSICAL EXAM
[UE] : Sensory: Intact in bilateral upper extremities [Rad] : radial 2+ and symmetric bilaterally [Normal] : Alert and in no acute distress [Poor Appearance] : well-appearing [Acute Distress] : not in acute distress [de-identified] : The patient has no respiratory distress. Mood and affect are normal. The patient is alert and oriented to person, place and time.\par Examination of the left shoulder demonstrates that it is clinically located.  There is mild anterior and lateral tenderness.  Shoulder range of motion is not assessed today.  Left elbow motion 0 to 120 degrees.  There is no pain with wrist motion.  Tendon function is intact.  The skin is intact.  There is no lymphedema. [de-identified] : \par ACC: [id number] EXAM: XR HUMERUS MIN 2 VIEWS LT\par ACC: [id number] EXAM: XR SHOULDER COMP MIN 2V LT\par \par PROCEDURE DATE: 02/27/2022\par INTERPRETATION: CLINICAL INDICATION: Left shoulder pain status post seizure/fall.\par \par TECHNIQUE: 2 views of the left shoulder, 2 views of the left humerus..\par \par COMPARISON: No similar prior comparisons available..\par \par IMPRESSION:\par Anterior dislocation of the left humerus with a Hill-Sachs deformity.\par Soft tissue swelling about the shoulder joint.\par \par --- End of Report ---\par \par ANT OCONNOR MD; Resident Radiologist\par This document has been electronically signed.\par PANCHITO ANDRE MD; Attending Radiologist\par This document has been electronically signed. Feb 28 2022 11:59AM\par \par \par ACC: [id number] EXAM: CT 3D RECONSTRUCT LUKE SANTIZO\par ACC: [id number] EXAM: CT SHOULDER ONLY LT\par \par PROCEDURE DATE: 02/28/2022\par \par \par \par INTERPRETATION: HISTORY: Left-sided shoulder pain. Shoulder dislocation.\par \par Helical CT imaging of the left shoulder was performed without intravenous contrast. Sagittal and coronal reformats were provided. 3-D reformats were performed on a separate workstation.\par \par Correlation is made with radiographs from February 27, 2022.\par \par FINDINGS:\par \par There is an acute mildly displaced fracture along the anteroinferior aspect of the glenoid. The dominant fragment measures approximately 1.1 x 0.4 x 0.6 cm. There is shallow impaction along the superolateral aspect of the humeral head/greater tuberosity consistent with a shallow impaction fracture. Surrounding enthesopathic change is noted. There is no dislocation on the current examination.\par \par There is moderate glenohumeral arthrosis. There is mild acromioclavicular joint arthrosis. Lower lumbar spondylosis is noted. There is a sclerotic focus within the T4 vertebral body which is nonspecific but likely related to a bone island.\par \par There is diffuse intramuscular myofascial edema about the left shoulder. There is fatty infiltration of the subscapularis and supraspinatus muscles. Reticulation and stranding is seen about the left axillary neurovascular structures.\par \par There is atherosclerotic disease. Prominent atelectasis seen at the left lung base.\par \par IMPRESSION:\par \par Acute mildly displaced fracture along the anteroinferior aspect of the glenoid. Shallow impaction fracture along the superolateral aspect of the humeral head/greater tuberosity. No dislocation on the current examination.\par \par --- End of Report ---\par \par \par JOSELIN NULL MD; Attending Radiologist\par This document has been electronically signed. Feb 28 2022 8:58AM\par \par \par AP, transscapular x-rays of the left shoulder taken today demonstrate that the shoulder is located.  There are degenerative changes of the left shoulder.

## 2022-03-07 NOTE — HISTORY OF PRESENT ILLNESS
[de-identified] : 89 year old female with history of seizure disorder presents today accompanied by her aide for evaluation of a left shoulder dislocation sustained 2/27/22 when she fell at home. She was evaluated at [facility] her shoulder was reduced and a CT shoulder was performed. She was placed in a sling which she is not wearing because it is uncomfortable. She complains of pain in the left arm. She is taking Tylenol as needed. She denies numbness or tingling.

## 2022-03-29 ENCOUNTER — APPOINTMENT (OUTPATIENT)
Dept: ORTHOPEDIC SURGERY | Facility: CLINIC | Age: 87
End: 2022-03-29
Payer: MEDICARE

## 2022-03-29 VITALS
WEIGHT: 90 LBS | HEART RATE: 124 BPM | HEIGHT: 60 IN | DIASTOLIC BLOOD PRESSURE: 74 MMHG | SYSTOLIC BLOOD PRESSURE: 125 MMHG | BODY MASS INDEX: 17.67 KG/M2

## 2022-03-29 PROCEDURE — 99213 OFFICE O/P EST LOW 20 MIN: CPT

## 2022-03-29 NOTE — PHYSICAL EXAM
[UE] : Sensory: Intact in bilateral upper extremities [Rad] : radial 2+ and symmetric bilaterally [Normal] : Alert and in no acute distress [Poor Appearance] : well-appearing [Acute Distress] : not in acute distress [de-identified] : The patient has no respiratory distress. Mood and affect are normal. The patient is alert and oriented to person, place and time.\par Examination of the left shoulder demonstrates that it is clinically located.  There is mild  lateral tenderness.  Shoulder range of motion is flexion 40 degrees, abduction 30 degrees and external rotation of 20 degrees.  Left elbow motion 0 to 120 degrees.  There is no pain with wrist motion.  Tendon function is intact.  The skin is intact.  There is no lymphedema.

## 2022-03-29 NOTE — HISTORY OF PRESENT ILLNESS
[de-identified] : The 89 year old patient returns today accompanied by her  and aide for re evaluation of the left shoulder. She is currently wearing a shoulder immobilizer. She says she is feeling better than she was 3 weeks ago. She also says she still has some pain on her left shoulder especially when she tries any ROM. Denies any numbness and tingling. She is still taking Tylenol as needed.

## 2022-03-29 NOTE — DISCUSSION/SUMMARY
[de-identified] : The patient's left shoulder is clinically located.  At this point she is started on a course of physical therapy.  She will be reevaluated in 1 month.

## 2022-04-15 ENCOUNTER — RX RENEWAL (OUTPATIENT)
Age: 87
End: 2022-04-15

## 2022-04-19 ENCOUNTER — APPOINTMENT (OUTPATIENT)
Dept: DERMATOLOGY | Facility: CLINIC | Age: 87
End: 2022-04-19

## 2022-05-13 ENCOUNTER — APPOINTMENT (OUTPATIENT)
Dept: ORTHOPEDIC SURGERY | Facility: CLINIC | Age: 87
End: 2022-05-13

## 2022-05-26 ENCOUNTER — APPOINTMENT (OUTPATIENT)
Dept: ORTHOPEDIC SURGERY | Facility: CLINIC | Age: 87
End: 2022-05-26

## 2022-05-31 ENCOUNTER — APPOINTMENT (OUTPATIENT)
Dept: ORTHOPEDIC SURGERY | Facility: CLINIC | Age: 87
End: 2022-05-31

## 2022-06-03 ENCOUNTER — APPOINTMENT (OUTPATIENT)
Dept: OPHTHALMOLOGY | Facility: CLINIC | Age: 87
End: 2022-06-03

## 2022-06-21 ENCOUNTER — NON-APPOINTMENT (OUTPATIENT)
Age: 87
End: 2022-06-21

## 2022-06-21 ENCOUNTER — APPOINTMENT (OUTPATIENT)
Dept: OPHTHALMOLOGY | Facility: CLINIC | Age: 87
End: 2022-06-21
Payer: MEDICARE

## 2022-06-21 PROCEDURE — 92133 CPTRZD OPH DX IMG PST SGM ON: CPT

## 2022-06-21 PROCEDURE — 92014 COMPRE OPH EXAM EST PT 1/>: CPT

## 2022-06-27 ENCOUNTER — NON-APPOINTMENT (OUTPATIENT)
Age: 87
End: 2022-06-27

## 2022-06-27 RX ORDER — TRIAMCINOLONE ACETONIDE 1 MG/G
0.1 OINTMENT TOPICAL TWICE DAILY
Qty: 1 | Refills: 1 | Status: ACTIVE | COMMUNITY
Start: 2020-07-13 | End: 1900-01-01

## 2022-06-29 ENCOUNTER — APPOINTMENT (OUTPATIENT)
Dept: OPHTHALMOLOGY | Facility: CLINIC | Age: 87
End: 2022-06-29
Payer: MEDICARE

## 2022-06-29 ENCOUNTER — NON-APPOINTMENT (OUTPATIENT)
Age: 87
End: 2022-06-29

## 2022-06-29 PROCEDURE — 92133 CPTRZD OPH DX IMG PST SGM ON: CPT

## 2022-06-29 PROCEDURE — 99214 OFFICE O/P EST MOD 30 MIN: CPT

## 2022-06-29 PROCEDURE — 92083 EXTENDED VISUAL FIELD XM: CPT

## 2022-07-08 ENCOUNTER — APPOINTMENT (OUTPATIENT)
Dept: DERMATOLOGY | Facility: CLINIC | Age: 87
End: 2022-07-08

## 2022-07-08 PROCEDURE — 96401 CHEMO ANTI-NEOPL SQ/IM: CPT

## 2022-07-08 PROCEDURE — 99214 OFFICE O/P EST MOD 30 MIN: CPT | Mod: 25

## 2022-07-08 RX ORDER — RUXOLITINIB 15 MG/G
1.5 CREAM TOPICAL
Qty: 1 | Refills: 0 | Status: ACTIVE | COMMUNITY
Start: 2022-07-08 | End: 1900-01-01

## 2022-07-11 RX ORDER — CRISABOROLE 20 MG/G
2 OINTMENT TOPICAL
Qty: 1 | Refills: 2 | Status: ACTIVE | COMMUNITY
Start: 2022-07-11 | End: 1900-01-01

## 2022-10-11 ENCOUNTER — APPOINTMENT (OUTPATIENT)
Dept: DERMATOLOGY | Facility: CLINIC | Age: 87
End: 2022-10-11

## 2022-10-11 ENCOUNTER — APPOINTMENT (OUTPATIENT)
Dept: ORTHOPEDIC SURGERY | Facility: CLINIC | Age: 87
End: 2022-10-11

## 2022-10-11 VITALS
HEART RATE: 114 BPM | HEIGHT: 60 IN | BODY MASS INDEX: 17.67 KG/M2 | DIASTOLIC BLOOD PRESSURE: 76 MMHG | WEIGHT: 90 LBS | SYSTOLIC BLOOD PRESSURE: 128 MMHG

## 2022-10-11 DIAGNOSIS — S43.005A UNSPECIFIED DISLOCATION OF LEFT SHOULDER JOINT, INITIAL ENCOUNTER: ICD-10-CM

## 2022-10-11 PROCEDURE — 99213 OFFICE O/P EST LOW 20 MIN: CPT

## 2022-10-11 NOTE — HISTORY OF PRESENT ILLNESS
[de-identified] : The patient presents for reevaluation of left shoulder s/p dislocation 2/27/22. She has been feeling improvement since her last visit, had been participating in PT 2 x per week up until last month which she felt was helping. She complains of intermittent aching pain in the left shoulder worse with lifting her arm overhead. She has not been taking any medications for the pain. She is interested in pursuing another course of PT.

## 2022-10-11 NOTE — PHYSICAL EXAM
[UE] : Sensory: Intact in bilateral upper extremities [Rad] : radial 2+ and symmetric bilaterally [Normal] : Alert and in no acute distress [Poor Appearance] : well-appearing [Acute Distress] : not in acute distress [de-identified] : The patient has no respiratory distress. Mood and affect are normal. The patient is alert and oriented to person, place and time.\par Examination of the left shoulder demonstrates that it is clinically located.  There is mild  lateral tenderness.  Shoulder range of motion is active flexion of 30 and passive flexion of 60 degrees, abduction 30 degrees and external rotation of 20 degrees.  Left elbow motion 0 to 120 degrees.  There is no pain with wrist motion.  Tendon function is intact.  The skin is intact.  There is no lymphedema. [de-identified] : Previously taken x-rays are reviewed.  The patient's shoulder is located.  She has changes consistent with glenohumeral arthritis.

## 2022-10-11 NOTE — DISCUSSION/SUMMARY
[de-identified] : The patient has osteoarthritis of the left shoulder.  She has had a shoulder dislocation and has a likely rotator cuff tear.  She is not a candidate for operative management at this time.  She is referred for further physical therapy.  She is in agreement with the plan.

## 2022-10-13 ENCOUNTER — APPOINTMENT (OUTPATIENT)
Dept: OPHTHALMOLOGY | Facility: CLINIC | Age: 87
End: 2022-10-13

## 2022-10-13 ENCOUNTER — NON-APPOINTMENT (OUTPATIENT)
Age: 87
End: 2022-10-13

## 2022-10-13 PROCEDURE — 92012 INTRM OPH EXAM EST PATIENT: CPT

## 2022-10-13 PROCEDURE — 76514 ECHO EXAM OF EYE THICKNESS: CPT

## 2022-10-13 PROCEDURE — 92020 GONIOSCOPY: CPT

## 2022-10-20 ENCOUNTER — NON-APPOINTMENT (OUTPATIENT)
Age: 87
End: 2022-10-20

## 2022-10-20 ENCOUNTER — APPOINTMENT (OUTPATIENT)
Dept: OPHTHALMOLOGY | Facility: CLINIC | Age: 87
End: 2022-10-20

## 2022-10-20 PROCEDURE — 92015 DETERMINE REFRACTIVE STATE: CPT

## 2022-10-20 PROCEDURE — 92012 INTRM OPH EXAM EST PATIENT: CPT

## 2022-12-22 ENCOUNTER — APPOINTMENT (OUTPATIENT)
Dept: DERMATOLOGY | Facility: CLINIC | Age: 87
End: 2022-12-22

## 2022-12-22 PROCEDURE — 99214 OFFICE O/P EST MOD 30 MIN: CPT | Mod: 95

## 2023-01-10 ENCOUNTER — APPOINTMENT (OUTPATIENT)
Dept: DERMATOLOGY | Facility: CLINIC | Age: 88
End: 2023-01-10

## 2023-01-12 ENCOUNTER — APPOINTMENT (OUTPATIENT)
Dept: OPHTHALMOLOGY | Facility: CLINIC | Age: 88
End: 2023-01-12

## 2023-01-27 ENCOUNTER — TRANSCRIPTION ENCOUNTER (OUTPATIENT)
Age: 88
End: 2023-01-27

## 2023-02-21 ENCOUNTER — APPOINTMENT (OUTPATIENT)
Dept: OPHTHALMOLOGY | Facility: CLINIC | Age: 88
End: 2023-02-21

## 2023-03-24 ENCOUNTER — APPOINTMENT (OUTPATIENT)
Dept: OPHTHALMOLOGY | Facility: CLINIC | Age: 88
End: 2023-03-24
Payer: MEDICARE

## 2023-03-24 ENCOUNTER — NON-APPOINTMENT (OUTPATIENT)
Age: 88
End: 2023-03-24

## 2023-03-24 PROCEDURE — 92133 CPTRZD OPH DX IMG PST SGM ON: CPT

## 2023-03-24 PROCEDURE — 92012 INTRM OPH EXAM EST PATIENT: CPT

## 2023-05-07 ENCOUNTER — TRANSCRIPTION ENCOUNTER (OUTPATIENT)
Age: 88
End: 2023-05-07

## 2023-05-07 ENCOUNTER — INPATIENT (INPATIENT)
Facility: HOSPITAL | Age: 88
LOS: 4 days | Discharge: NURSING, REHAB OR LONG-TERM CARE | DRG: 480 | End: 2023-05-12
Attending: ORTHOPAEDIC SURGERY | Admitting: ORTHOPAEDIC SURGERY
Payer: MEDICARE

## 2023-05-07 VITALS
DIASTOLIC BLOOD PRESSURE: 77 MMHG | WEIGHT: 110.01 LBS | TEMPERATURE: 98 F | SYSTOLIC BLOOD PRESSURE: 124 MMHG | HEIGHT: 63.5 IN | HEART RATE: 77 BPM | RESPIRATION RATE: 18 BRPM | OXYGEN SATURATION: 94 %

## 2023-05-07 DIAGNOSIS — Z98.41 CATARACT EXTRACTION STATUS, RIGHT EYE: Chronic | ICD-10-CM

## 2023-05-07 DIAGNOSIS — S72.009A FRACTURE OF UNSPECIFIED PART OF NECK OF UNSPECIFIED FEMUR, INITIAL ENCOUNTER FOR CLOSED FRACTURE: ICD-10-CM

## 2023-05-07 LAB
ALBUMIN SERPL ELPH-MCNC: 3.7 G/DL — SIGNIFICANT CHANGE UP (ref 3.3–5)
ALP SERPL-CCNC: 86 U/L — SIGNIFICANT CHANGE UP (ref 40–120)
ALT FLD-CCNC: 15 U/L — SIGNIFICANT CHANGE UP (ref 10–45)
ANION GAP SERPL CALC-SCNC: 14 MMOL/L — SIGNIFICANT CHANGE UP (ref 5–17)
APTT BLD: 23 SEC — LOW (ref 27.5–35.5)
AST SERPL-CCNC: 27 U/L — SIGNIFICANT CHANGE UP (ref 10–40)
BASOPHILS # BLD AUTO: 0.08 K/UL — SIGNIFICANT CHANGE UP (ref 0–0.2)
BASOPHILS NFR BLD AUTO: 0.6 % — SIGNIFICANT CHANGE UP (ref 0–2)
BILIRUB SERPL-MCNC: 0.2 MG/DL — SIGNIFICANT CHANGE UP (ref 0.2–1.2)
BLD GP AB SCN SERPL QL: NEGATIVE — SIGNIFICANT CHANGE UP
BUN SERPL-MCNC: 21 MG/DL — SIGNIFICANT CHANGE UP (ref 7–23)
CALCIUM SERPL-MCNC: 9.9 MG/DL — SIGNIFICANT CHANGE UP (ref 8.4–10.5)
CHLORIDE SERPL-SCNC: 104 MMOL/L — SIGNIFICANT CHANGE UP (ref 96–108)
CO2 SERPL-SCNC: 21 MMOL/L — LOW (ref 22–31)
CREAT SERPL-MCNC: 0.66 MG/DL — SIGNIFICANT CHANGE UP (ref 0.5–1.3)
EGFR: 83 ML/MIN/1.73M2 — SIGNIFICANT CHANGE UP
EOSINOPHIL # BLD AUTO: 0.17 K/UL — SIGNIFICANT CHANGE UP (ref 0–0.5)
EOSINOPHIL NFR BLD AUTO: 1.3 % — SIGNIFICANT CHANGE UP (ref 0–6)
GLUCOSE SERPL-MCNC: 130 MG/DL — HIGH (ref 70–99)
HCT VFR BLD CALC: 40 % — SIGNIFICANT CHANGE UP (ref 34.5–45)
HGB BLD-MCNC: 13 G/DL — SIGNIFICANT CHANGE UP (ref 11.5–15.5)
IMM GRANULOCYTES NFR BLD AUTO: 1.4 % — HIGH (ref 0–0.9)
INR BLD: 1.11 RATIO — SIGNIFICANT CHANGE UP (ref 0.88–1.16)
LYMPHOCYTES # BLD AUTO: 1.17 K/UL — SIGNIFICANT CHANGE UP (ref 1–3.3)
LYMPHOCYTES # BLD AUTO: 9.2 % — LOW (ref 13–44)
MCHC RBC-ENTMCNC: 30.3 PG — SIGNIFICANT CHANGE UP (ref 27–34)
MCHC RBC-ENTMCNC: 32.5 GM/DL — SIGNIFICANT CHANGE UP (ref 32–36)
MCV RBC AUTO: 93.2 FL — SIGNIFICANT CHANGE UP (ref 80–100)
MONOCYTES # BLD AUTO: 0.72 K/UL — SIGNIFICANT CHANGE UP (ref 0–0.9)
MONOCYTES NFR BLD AUTO: 5.6 % — SIGNIFICANT CHANGE UP (ref 2–14)
NEUTROPHILS # BLD AUTO: 10.43 K/UL — HIGH (ref 1.8–7.4)
NEUTROPHILS NFR BLD AUTO: 81.9 % — HIGH (ref 43–77)
NRBC # BLD: 0 /100 WBCS — SIGNIFICANT CHANGE UP (ref 0–0)
PLATELET # BLD AUTO: 277 K/UL — SIGNIFICANT CHANGE UP (ref 150–400)
POTASSIUM SERPL-MCNC: 5 MMOL/L — SIGNIFICANT CHANGE UP (ref 3.5–5.3)
POTASSIUM SERPL-SCNC: 5 MMOL/L — SIGNIFICANT CHANGE UP (ref 3.5–5.3)
PROT SERPL-MCNC: 7.2 G/DL — SIGNIFICANT CHANGE UP (ref 6–8.3)
PROTHROM AB SERPL-ACNC: 12.9 SEC — SIGNIFICANT CHANGE UP (ref 10.5–13.4)
RBC # BLD: 4.29 M/UL — SIGNIFICANT CHANGE UP (ref 3.8–5.2)
RBC # FLD: 14 % — SIGNIFICANT CHANGE UP (ref 10.3–14.5)
RH IG SCN BLD-IMP: POSITIVE — SIGNIFICANT CHANGE UP
SARS-COV-2 RNA SPEC QL NAA+PROBE: SIGNIFICANT CHANGE UP
SODIUM SERPL-SCNC: 139 MMOL/L — SIGNIFICANT CHANGE UP (ref 135–145)
WBC # BLD: 12.75 K/UL — HIGH (ref 3.8–10.5)
WBC # FLD AUTO: 12.75 K/UL — HIGH (ref 3.8–10.5)

## 2023-05-07 PROCEDURE — 73552 X-RAY EXAM OF FEMUR 2/>: CPT | Mod: 26,RT

## 2023-05-07 PROCEDURE — 70450 CT HEAD/BRAIN W/O DYE: CPT | Mod: 26,MG

## 2023-05-07 PROCEDURE — 99285 EMERGENCY DEPT VISIT HI MDM: CPT | Mod: FS,25

## 2023-05-07 PROCEDURE — 71045 X-RAY EXAM CHEST 1 VIEW: CPT | Mod: 26

## 2023-05-07 PROCEDURE — 12002 RPR S/N/AX/GEN/TRNK2.6-7.5CM: CPT

## 2023-05-07 PROCEDURE — 72125 CT NECK SPINE W/O DYE: CPT | Mod: 26,MG

## 2023-05-07 PROCEDURE — G1004: CPT

## 2023-05-07 PROCEDURE — 73502 X-RAY EXAM HIP UNI 2-3 VIEWS: CPT | Mod: 26,RT

## 2023-05-07 RX ORDER — OXYCODONE HYDROCHLORIDE 5 MG/1
2.5 TABLET ORAL EVERY 4 HOURS
Refills: 0 | Status: DISCONTINUED | OUTPATIENT
Start: 2023-05-07 | End: 2023-05-12

## 2023-05-07 RX ORDER — RUFINAMIDE 40 MG/ML
1600 SUSPENSION ORAL ONCE
Refills: 0 | Status: COMPLETED | OUTPATIENT
Start: 2023-05-07 | End: 2023-05-07

## 2023-05-07 RX ORDER — RUFINAMIDE 40 MG/ML
1200 SUSPENSION ORAL
Refills: 0 | Status: DISCONTINUED | OUTPATIENT
Start: 2023-05-07 | End: 2023-05-12

## 2023-05-07 RX ORDER — SENNA PLUS 8.6 MG/1
2 TABLET ORAL AT BEDTIME
Refills: 0 | Status: DISCONTINUED | OUTPATIENT
Start: 2023-05-07 | End: 2023-05-09

## 2023-05-07 RX ORDER — MAGNESIUM HYDROXIDE 400 MG/1
30 TABLET, CHEWABLE ORAL DAILY
Refills: 0 | Status: DISCONTINUED | OUTPATIENT
Start: 2023-05-07 | End: 2023-05-12

## 2023-05-07 RX ORDER — LORATADINE 10 MG/1
10 TABLET ORAL DAILY
Refills: 0 | Status: DISCONTINUED | OUTPATIENT
Start: 2023-05-07 | End: 2023-05-12

## 2023-05-07 RX ORDER — CHLORHEXIDINE GLUCONATE 213 G/1000ML
1 SOLUTION TOPICAL EVERY 12 HOURS
Refills: 0 | Status: COMPLETED | OUTPATIENT
Start: 2023-05-07 | End: 2023-05-08

## 2023-05-07 RX ORDER — TETANUS TOXOID, REDUCED DIPHTHERIA TOXOID AND ACELLULAR PERTUSSIS VACCINE, ADSORBED 5; 2.5; 8; 8; 2.5 [IU]/.5ML; [IU]/.5ML; UG/.5ML; UG/.5ML; UG/.5ML
0.5 SUSPENSION INTRAMUSCULAR ONCE
Refills: 0 | Status: COMPLETED | OUTPATIENT
Start: 2023-05-07 | End: 2023-05-07

## 2023-05-07 RX ORDER — OXYCODONE HYDROCHLORIDE 5 MG/1
5 TABLET ORAL EVERY 4 HOURS
Refills: 0 | Status: DISCONTINUED | OUTPATIENT
Start: 2023-05-07 | End: 2023-05-12

## 2023-05-07 RX ORDER — SODIUM CHLORIDE 9 MG/ML
1000 INJECTION INTRAMUSCULAR; INTRAVENOUS; SUBCUTANEOUS
Refills: 0 | Status: DISCONTINUED | OUTPATIENT
Start: 2023-05-07 | End: 2023-05-12

## 2023-05-07 RX ORDER — ACETAMINOPHEN 500 MG
975 TABLET ORAL EVERY 8 HOURS
Refills: 0 | Status: DISCONTINUED | OUTPATIENT
Start: 2023-05-07 | End: 2023-05-09

## 2023-05-07 RX ORDER — ACETAMINOPHEN 500 MG
750 TABLET ORAL ONCE
Refills: 0 | Status: COMPLETED | OUTPATIENT
Start: 2023-05-07 | End: 2023-05-07

## 2023-05-07 RX ORDER — RUFINAMIDE 40 MG/ML
400 SUSPENSION ORAL
Refills: 0 | Status: DISCONTINUED | OUTPATIENT
Start: 2023-05-07 | End: 2023-05-07

## 2023-05-07 RX ORDER — CHLORHEXIDINE GLUCONATE 213 G/1000ML
1 SOLUTION TOPICAL ONCE
Refills: 0 | Status: COMPLETED | OUTPATIENT
Start: 2023-05-07 | End: 2023-05-07

## 2023-05-07 RX ORDER — LATANOPROST 0.05 MG/ML
1 SOLUTION/ DROPS OPHTHALMIC; TOPICAL AT BEDTIME
Refills: 0 | Status: DISCONTINUED | OUTPATIENT
Start: 2023-05-07 | End: 2023-05-12

## 2023-05-07 RX ORDER — RUFINAMIDE 40 MG/ML
2000 SUSPENSION ORAL
Refills: 0 | Status: DISCONTINUED | OUTPATIENT
Start: 2023-05-07 | End: 2023-05-12

## 2023-05-07 RX ORDER — RUFINAMIDE 40 MG/ML
300 SUSPENSION ORAL
Refills: 0 | Status: DISCONTINUED | OUTPATIENT
Start: 2023-05-07 | End: 2023-05-07

## 2023-05-07 RX ADMIN — TETANUS TOXOID, REDUCED DIPHTHERIA TOXOID AND ACELLULAR PERTUSSIS VACCINE, ADSORBED 0.5 MILLILITER(S): 5; 2.5; 8; 8; 2.5 SUSPENSION INTRAMUSCULAR at 16:21

## 2023-05-07 RX ADMIN — RUFINAMIDE 1600 MILLIGRAM(S): 40 SUSPENSION ORAL at 20:33

## 2023-05-07 RX ADMIN — Medication 300 MILLIGRAM(S): at 15:03

## 2023-05-07 NOTE — ED PROVIDER NOTE - LOWER EXTREMITY EXAM, RIGHT
Right leg appears slightly shortened and internally rotated compared to left. +ttp greater trochanter of R hip. +pain w/ log roll. Unable to range leg at hip 2/2 pain. Unable to range leg at knee 2/2 pain. Full AROM ankle. Sensation intact. 2+ DP and PT pulses b/l.

## 2023-05-07 NOTE — ED PROVIDER NOTE - ATTENDING CONTRIBUTION TO CARE
Attending MD Ybarra: I personally have seen and examined this patient.  Resident note reviewed and agree on plan of care and except where noted.  See below for details.     Seen in [facility], accompanied by     89F with PMH/PSH including seizure disorder on Banzel, HLD, meningitis, IBS, mild dementia presents to the ED brought in by EMS s/p fall with R scalp pain and R hip pain.   providing history given dementia.  Patient recounted event but  says that he witnessed entire thing and the patient 's account is inaccurate.  Reports that she was in the den and he told her to wait there as she walks with assistance.  Reports patient then reported right scalp and right hip pain s/p fall.   Denies witnessing seizure like activity or incontinence.  Patient denies chest pain, shortness of breath, abdominal pain, nausea, vomiting, diarrhea, urinary complaints, recent illness, fevers, chills.      Exam:   General: NAD  HENT: head NCAT except for 2cm lac to R parietal scalp, airway patent, no dried blood at nares  Eyes: anicteric, no conjunctival injection, PERRL, EOMI  Lungs: lungs CTAB with good inspiratory effort, no wheezing, no rhonchi, no rales  Cardiac: +S1S2, no obvious m/r/g  GI: abdomen soft with +BS, NT, ND  : no CVAT  MSK: ranging neck freely, no tenderness to midline palpation, +tenderness to palpation to R hip and R prox lateral thigh, +2 DPs  Neuro: moving all extremities spontaneously, nonfocal, no saddle anesthesia, CN 2-12 grossly intact  Skin: scalp lac as above    A/P: 89F with R head lac and RLE pain s/p fall, will evaluate for but not limited to bony injury of R hip/RLE, likely ortho consult, will obtain CTH to eval for ICH, CT C spine for bony injury, will obtain labs for metabolic derangement, will obtain CXR and UA for occult infection, will likely need admission

## 2023-05-07 NOTE — H&P ADULT - ASSESSMENT
Assessment and Plan    Patient is a 89y y/o Female who presented with Right IT femur fracture.  Patient is currently hemodynamically stable.     - Multimodal Pain control  - Bed Rest  - NPO/IVF after MN  - CBC/BMP/Coags/UA/T+S x2  - EKG/CXR  - Medical clearance  - Plan for OR for IMN    [place]  [facility] [id number]        [id number]  [facility]  [id number]/ [phone]

## 2023-05-07 NOTE — ED PROVIDER NOTE - PROGRESS NOTE DETAILS
requesting patient's home Banzel dose of 2000 mg (states takes 5 400 mg tablets at 5 PM and 3 to 400 mg tablets in a.m.) discussed with pharmacy who advised this is still within max daily dose though they normally divide dose equally twice daily. - Arturo Cha PA-C [facility] PGY2: spoke to son on phone Dr Schwartz who was requesting update from ortho. Ortho aware of patient, awaiting final recs and dispo.  Dr Schwartz expressed concern about pt having mild dementia,  went home. Pt moved closer to nursing station - remains comfortable and resting in bed. Dr schwartz aware that ortho to see pt

## 2023-05-07 NOTE — ED ADULT NURSE NOTE - NSIMPLEMENTINTERV_GEN_ALL_ED
Implemented All Fall with Harm Risk Interventions:  [place] to call system. Call bell, personal items and telephone within reach. Instruct patient to call for assistance. Room bathroom lighting operational. Non-slip footwear when patient is off stretcher. Physically safe environment: no spills, clutter or unnecessary equipment. Stretcher in lowest position, wheels locked, appropriate side rails in place. Provide visual cue, wrist band, yellow gown, etc. Monitor gait and stability. Monitor for mental status changes and reorient to person, place, and time. Review medications for side effects contributing to fall risk. Reinforce activity limits and safety measures with patient and family. Provide visual clues: red socks.

## 2023-05-07 NOTE — ED PROVIDER NOTE - ENMT, MLM
Head: 2.0 cm laceration to R parietal scalp w/ oozing blood. Airway patent, Nasal mucosa clear. Mouth with normal mucosa. Throat has no vesicles, no oropharyngeal exudates and uvula is midline. No posterior auricular or periorbital ecchymosis. No facial bone tenderness. No rhinorrhea/otorrhea.

## 2023-05-07 NOTE — ED PROCEDURE NOTE - NUMBER OF STAPLES
Chief Complaint   Patient presents with   • Annual Exam         HPI:  Dwayne is a 65 y.o. here for Medicare Annual Wellness Visit        Patient Active Problem List    Diagnosis Date Noted   • Chest pain 11/25/2014   • Numbness L side 11/24/2014   • GERD (gastroesophageal reflux disease) 11/25/2014   • Hypothyroid 11/25/2014   • BPH (benign prostatic hypertrophy) 11/25/2014   • Need for diphtheria-tetanus-pertussis (Tdap) vaccine 12/17/2019   • Lightheadedness 10/31/2019   • Encounter for hepatitis C screening test for low risk patient 10/31/2019   • Flu vaccine need 10/31/2019   • Elevated PSA 08/23/2019   • Polyuria 05/22/2017   • Seasonal affective disorder (HCC) 11/18/2016   • Lumbosacral radiculopathy 07/13/2016   • Paresthesia 10/14/2015   • Hyperlipidemia 10/13/2015   • Essential hypertension 10/13/2015   • Hay fever 10/13/2015   • Chronic headache 10/13/2015   • Routine general medical examination at a health care facility 10/13/2015   • Arthralgia 08/11/2015   • Myalgia 08/11/2015   • Fatigue 08/11/2015   • Depression 08/11/2015   • Fasting hyperglycemia 04/27/2015   • Lesion of ulnar nerve 03/25/2015   • Neck pain 02/18/2015       Current Outpatient Medications   Medication Sig Dispense Refill   • lisinopril (PRINIVIL) 20 MG Tab Take 1 Tab by mouth every day. 90 Tab 3   • amLODIPine (NORVASC) 10 MG Tab Take 1 Tab by mouth every day. 90 Tab 3   • metFORMIN ER (GLUCOPHAGE XR) 500 MG TABLET SR 24 HR Take 2 Tabs by mouth 2 times a day. 360 Tab 3   • montelukast (SINGULAIR) 10 MG Tab Take  by mouth every day. Take 1 tablet by mouth daily     • levothyroxine (SYNTHROID) 75 MCG Tab TAKE 1 TABLET BY MOUTH EVERY MORNING ON AN EMPTY STOMACH 90 Tab 3   • azelastine (ASTELIN) 137 MCG/SPRAY nasal spray USE 2 SPRAYS IN EACH NOSTRIL EVERYDAY     • lisinopril-hydrochlorothiazide (PRINZIDE) 20-25 MG per tablet TAKE 1 TABLET BY MOUTH EVERY DAY 90 Tab 3   • alfuzosin (UROXATRAL) 10 MG SR tablet Take 10 mg by mouth every day.   11   • atorvastatin (LIPITOR) 40 MG Tab Take 1 Tab by mouth every day. 90 Tab 3   • diazepam (VALIUM) 2 MG Tab Take 2 mg by mouth every 6 hours as needed for Anxiety.     • tramadol (ULTRAM) 50 MG TABS Take 50 mg by mouth every four hours as needed.     • predniSONE (DELTASONE) 10 MG Tab TAKE 3 TABS BY MOUTH DAILY X3DYS,2 DAILY X3DYS, THEN 1 DAILY X3DYS     • Sulfamethoxazole-Trimethoprim (BACTRIM PO) Take  by mouth.     • raNITidine (ZANTAC) 150 MG Tab TAKE 2 TABS BY MOUTH EVERY EVENING. (Patient not taking: Reported on 2/28/2020) 180 Tab 4   • tamsulosin (FLOMAX) 0.4 MG capsule Take 0.4 mg by mouth ONE-HALF HOUR AFTER BREAKFAST.     • fluticasone (FLONASE) 50 MCG/ACT nasal spray Spray 1 Spray in nose every day. (Patient not taking: Reported on 2/28/2020) 16 g 11     No current facility-administered medications for this visit.        Patient is taking medications as noted in medication list.  Current supplements as per medication list.     Allergies: Patient has no known allergies.    Current social contact/activities: walk    Is patient current with immunizations? Yes.    He  reports that he has never smoked. He has never used smokeless tobacco. He reports current alcohol use. He reports that he does not use drugs.  Counseling given: Not Answered        DPA/Advanced directive: Patient has Living Will, but it is not on file. Instructed to bring in a copy to scan into their chart.    ROS:    Gait: Uses no assistive device   Ostomy: No   Other tubes: No   Amputations: No   Chronic oxygen use No   Last eye exam 2/2021  Wears hearing aids: No   : Reports urinary leakage during the last 6 months that has not interfered at all with their daily activities or sleep.      Screening:He will get Shingrix and Pneumovax after he gets his Covid vaccinations.        Depression Screening    Little interest or pleasure in doing things?  0 - not at all  Feeling down, depressed, or hopeless? 0 - not at all  Patient Health  Questionnaire Score: 0    If depressive symptoms identified deferred to follow up visit unless specifically addressed in assessment and plan.    Interpretation of PHQ-9 Total Score   Score Severity   1-4 No Depression   5-9 Mild Depression   10-14 Moderate Depression   15-19 Moderately Severe Depression   20-27 Severe Depression    Screening for Cognitive Impairment    Three Minute Recall (river, nation, finger)  3/3    Sulaiman clock face with all 12 numbers and set the hands to show 10 past 11.  Yes 5/5 11:10   If cognitive concerns identified, deferred for follow up unless specifically addressed in assessment and plan.    Fall Risk Assessment    Has the patient had two or more falls in the last year or any fall with injury in the last year?  No  If fall risk identified, deferred for follow up unless specifically addressed in assessment and plan.    Safety Assessment    Throw rugs on floor.  No  Handrails on all stairs.  Yes  Good lighting in all hallways.  Yes  Difficulty hearing.  No  Patient counseled about all safety risks that were identified.    Functional Assessment ADLs    Are there any barriers preventing you from cooking for yourself or meeting nutritional needs?  No.    Are there any barriers preventing you from driving safely or obtaining transportation?  No.    Are there any barriers preventing you from using a telephone or calling for help?  No.    Are there any barriers preventing you from shopping?  No.    Are there any barriers preventing you from taking care of your own finances?  No.    Are there any barriers preventing you from managing your medications?  No.    Are there any barriers preventing you from showering, bathing or dressing yourself?  No.    Are you currently engaging in any exercise or physical activity?  Yes.     What is your perception of your health?  Good.    Health Maintenance Summary                Annual Wellness Visit Overdue 1955     IMM ZOSTER VACCINES Overdue 6/22/2005   "   IMM PNEUMOCOCCAL VACCINE: 65+ Years Overdue 6/22/2020     COLONOSCOPY Next Due 11/18/2026      Patient Declined 11/18/2016     IMM DTaP/Tdap/Td Vaccine Next Due 12/17/2029      Done 12/17/2019 Imm Admin: Tdap Vaccine          Patient Care Team:  Jose Yen M.D. as PCP - General (Internal Medicine)  Nasir Allen M.D. as Consulting Physician (Neurosurgery)  Keith Samuels M.D. as Consulting Physician (Gastroenterology)  Bartolo Pena M.D. as Consulting Physician (Urology)    Social History     Tobacco Use   • Smoking status: Never Smoker   • Smokeless tobacco: Never Used   Substance Use Topics   • Alcohol use: Yes     Comment: 0-1 per month   • Drug use: No     Family History   Problem Relation Age of Onset   • Hypertension Mother    • Diabetes Mother         High Blood sugar levels    • Hypertension Father    • Heart Failure Father    • Alcohol/Drug Father      He  has a past medical history of Anesthesia, Arrhythmia, Arthritis, Chronic headache (10/13/2015), Depression (8/11/2015), Elevated PSA (8/23/2019), Fasting hyperglycemia (4/27/2015), Gastroesophageal reflux disease without esophagitis (10/13/2015), GERD (gastroesophageal reflux disease), Heart burn, Hiatus hernia syndrome, High cholesterol, HTN (hypertension), benign (1/5/2015), Hypertension, Indigestion, Lightheadedness (10/31/2019), Lumbar disc disease, Numbness, Polyuria (5/22/2017), Psychiatric problem, Routine general medical examination at a health care facility (10/13/2015), and Unspecified disorder of thyroid.   Past Surgical History:   Procedure Laterality Date   • NERVE ULNAR TRANSFER  3/25/2015    Performed by Nasir Allen M.D. at SURGERY [facility]   • OTHER ORTHOPEDIC SURGERY      bilat knee X2   • OTHER ORTHOPEDIC SURGERY      patella removal right            Exam:     /88 (BP Location: Left arm)   Pulse 79   Temp 37.1 °C (98.7 °F)   Resp 16   Ht 1.905 m (6' 3\")   Wt 96.6 kg (213 lb)   SpO2 95%  Body " mass index is 26.62 kg/m².    Hearing good.    Dentition good  Alert, oriented in no acute distress.  Eye contact is good, speech goal directed, affect calm.  Neck is supple and without significant lymphadenopathy.  Lungs are clear without obvious rales or wheeze.  Cardiovascular peripheral circulation is satisfactory.  Heart sounds are unremarkable.  Abdomen is soft without obvious masses or tenderness.  There are normal bowel sounds.  Rectal and genital exams were not performed.  Extremities were without significant edema, cyanosis, or deformity.  Brief neurologic exam was unremarkable with essentially normal sensation, strength, gait, and cerebellar functions.      Assessment and Plan. The following treatment and monitoring plan is recommended:    GERD (gastroesophageal reflux disease)  He has gastroesophageal reflux that he treats successfully with Zantac.  Symptoms are unchanged recently.    Hyperlipidemia  He is on Lipitor for hyperlipidemia.  Most recent cholesterol is 137, triglycerides 67, HDL 41, LDL 83.  He is trying to follow appropriate diet.  He is not in much of an exercise program at this time.    Essential hypertension  Hypertension is fairly well controlled with lisinopril and amlodipine and hydrochlorothiazide.  He denies significant lightheadedness.    Seasonal affective disorder (CMS-[facility])  He has seasonal affective disorder that is moderately well controlled with light.    Elevated PSA  He has elevated PSA that is being followed closely by Dr. Pena.    Lightheadedness  He has occasional episodes of momentary lightheadedness that are unchanged recently.  He has not noticed any significant dip in his blood pressure.    Lumbosacral radiculopathy  He has lumbosacral radiculopathy that is unchanged recently.  Symptoms are relatively mild at this time.    Type 2 diabetes mellitus without complication, without long-term current use of insulin ([facility])  He has diabetes mellitus for which he is on  3 Metformin.  Most recent blood sugar is 126, glycohemoglobin is 6.2, microalbumin is less than 1.2.  He denies low blood sugar reactions.  He has not been following his diet quite as carefully as he should he reports.  He has not been getting as much exercise as he should he reports.    Hypothyroid  He takes levothyroxine for hypothyroidism.  Clinically he is euthyroid.    BPH (benign prostatic hypertrophy)  He has obstructive uropathy secondary to benign prostatic hypertrophy.  He is on Uroxatrol with good control of symptoms.        Services suggested: No services needed at this time  Health Care Screening recommendations as per orders if indicated.  Referrals offered: PT/OT/Nutrition counseling/Behavioral Health/Smoking cessation as per orders if indicated.    Discussion today about general wellness and lifestyle habits:    · Prevent falls and reduce trip hazards; Cautioned about securing or removing rugs.  · Have a working fire alarm and carbon monoxide detector;   · Engage in regular physical activity and social activities       Follow-up: 4 months if not sooner.

## 2023-05-07 NOTE — ED ADULT NURSE NOTE - OBJECTIVE STATEMENT
89 y/o F with PMH of dementia, HLD, seizure disorder, macular degeneration presents to ED via EMS from home complaining of a fall. Pt uses cane at baseline. Pt went to walk without cane and loss her balance and fell on her right side. Fall was unwitnessed, pts  in other room. Pt denies and LOC. Not on any anticoagulants. Upon arrival, pt has laceration on right side of head. Right leg is internally rotated. Pt is A&Ox4, moving all extremities. Denies headache, dizziness, vision changes, chest pain, shortness of breath, abdominal pain, nausea, vomiting, diarrhea, fevers, chills, dysuria, hematuria, recent illness travel.

## 2023-05-07 NOTE — ED PROVIDER NOTE - OBJECTIVE STATEMENT
89 yo female history seizure disorder on Banzel, HLD, meningitis, IBS, mild dementia presents to the ED BIBEMS for reported mechanical fall c/o laceration to right scalp and right hip pain.   at bedside assist with history given mild dementia. States patient normally ambulates with walker/cane, but attempted to ambulate in the hallway today without either, he saw patient walking past him and then heard her fall immediately in the next room, reports went right to her and found her on the floor awake and alert, no bowel/bladder incontinence or seizure activity. Patient states fell onto right side and hit her head on nearby furniture, was unable to get up on her own so EMS was called.  Currently c/o bleeding from cut to right side of scalp and right side hip pain worse with attempted movement.  Denies LOC, speech/visual changes, neck pain, back pain, chest pain, shortness of breath, dizziness/lightheadedness, fever/chills/recent illness, numbness/tingling.

## 2023-05-07 NOTE — H&P ADULT - HISTORY OF PRESENT ILLNESS
HPI  89y Female presents s/p [facility] fall c/o severe right hip pain and inability to ambulate.  Patient denies headstrike or LOC. Patient denies radiation of pain. Patient denies numbness/tingling/burning in the RLE. No other bone/joint complaints. Patient is a community ambulator at baseline with/without assistive devices.    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      Dementia      Cataract extraction status, right  2011        MEDICATIONS  (STANDING):    MEDICATIONS  (PRN):    Allergies    Nuts (Hives)   reports severe allergy to PEAS and NUTS (Hives)  penicillin (Unknown)    Intolerances                              13.0   12.75 )-----------( 277      ( 07 May 2023 15:04 )             40.0     05-07    139  |  104  |  21  ----------------------------<  130<H>  5.0   |  21<L>  |  0.66    Ca    9.9      07 May 2023 15:04    TPro  7.2  /  Alb  3.7  /  TBili  0.2  /  DBili  x   /  AST  27  /  ALT  15  /  AlkPhos  86  05-07    PT/INR - ( 07 May 2023 15:04 )   PT: 12.9 sec;   INR: 1.11 ratio         PTT - ( 07 May 2023 15:04 )  PTT:23.0 sec    T(C): 36.7 (05-07-23 @ 22:41), Max: 37.1 (05-07-23 @ 15:00)  HR: 95 (05-07-23 @ 22:41) (77 - 95)  BP: 110/75 (05-07-23 @ 22:41) (103/60 - 124/77)  RR: 16 (05-07-23 @ 22:41) (16 - 18)  SpO2: 96% (05-07-23 @ 22:41) (94% - 97%)  Wt(kg): --    Physical Exam  Gen: NAD  Resp: Non-labored breathing  RLE:  Skin intact  No ecchymosis/soft tissue swelling  + TTP about hip   ROM lmited 2/2 pain   Unable to SLR  + Log Roll/Heel Strike  No TTP to knee/leg/ankle/foot   Motor intact GS/TA/FHL/EHL  SILT L2-S1  Compartments soft  DP/PT pulses palpable    LLE/BUE:   No bony TTP; Good ROM w/o pain; Exam Unremarkable    Imaging:  XR demonstrating Right femoral intertroch  fracture

## 2023-05-08 ENCOUNTER — TRANSCRIPTION ENCOUNTER (OUTPATIENT)
Age: 88
End: 2023-05-08

## 2023-05-08 DIAGNOSIS — S72.001A FRACTURE OF UNSPECIFIED PART OF NECK OF RIGHT FEMUR, INITIAL ENCOUNTER FOR CLOSED FRACTURE: ICD-10-CM

## 2023-05-08 DIAGNOSIS — R52 PAIN, UNSPECIFIED: ICD-10-CM

## 2023-05-08 DIAGNOSIS — F03.90 UNSPECIFIED DEMENTIA WITHOUT BEHAVIORAL DISTURBANCE: ICD-10-CM

## 2023-05-08 DIAGNOSIS — R56.9 UNSPECIFIED CONVULSIONS: ICD-10-CM

## 2023-05-08 LAB
ANION GAP SERPL CALC-SCNC: 13 MMOL/L — SIGNIFICANT CHANGE UP (ref 5–17)
APTT BLD: 26.6 SEC — LOW (ref 27.5–35.5)
BUN SERPL-MCNC: 20 MG/DL — SIGNIFICANT CHANGE UP (ref 7–23)
CALCIUM SERPL-MCNC: 9.4 MG/DL — SIGNIFICANT CHANGE UP (ref 8.4–10.5)
CHLORIDE SERPL-SCNC: 104 MMOL/L — SIGNIFICANT CHANGE UP (ref 96–108)
CO2 SERPL-SCNC: 24 MMOL/L — SIGNIFICANT CHANGE UP (ref 22–31)
CREAT SERPL-MCNC: 0.77 MG/DL — SIGNIFICANT CHANGE UP (ref 0.5–1.3)
EGFR: 73 ML/MIN/1.73M2 — SIGNIFICANT CHANGE UP
GLUCOSE SERPL-MCNC: 127 MG/DL — HIGH (ref 70–99)
HCT VFR BLD CALC: 36.6 % — SIGNIFICANT CHANGE UP (ref 34.5–45)
HGB BLD-MCNC: 11.8 G/DL — SIGNIFICANT CHANGE UP (ref 11.5–15.5)
INR BLD: 1.2 RATIO — HIGH (ref 0.88–1.16)
MCHC RBC-ENTMCNC: 29.5 PG — SIGNIFICANT CHANGE UP (ref 27–34)
MCHC RBC-ENTMCNC: 32.2 GM/DL — SIGNIFICANT CHANGE UP (ref 32–36)
MCV RBC AUTO: 91.5 FL — SIGNIFICANT CHANGE UP (ref 80–100)
NRBC # BLD: 0 /100 WBCS — SIGNIFICANT CHANGE UP (ref 0–0)
PLATELET # BLD AUTO: 277 K/UL — SIGNIFICANT CHANGE UP (ref 150–400)
POTASSIUM SERPL-MCNC: 4.4 MMOL/L — SIGNIFICANT CHANGE UP (ref 3.5–5.3)
POTASSIUM SERPL-SCNC: 4.4 MMOL/L — SIGNIFICANT CHANGE UP (ref 3.5–5.3)
PROTHROM AB SERPL-ACNC: 13.9 SEC — HIGH (ref 10.5–13.4)
RBC # BLD: 4 M/UL — SIGNIFICANT CHANGE UP (ref 3.8–5.2)
RBC # FLD: 14 % — SIGNIFICANT CHANGE UP (ref 10.3–14.5)
SODIUM SERPL-SCNC: 141 MMOL/L — SIGNIFICANT CHANGE UP (ref 135–145)
WBC # BLD: 12.71 K/UL — HIGH (ref 3.8–10.5)
WBC # FLD AUTO: 12.71 K/UL — HIGH (ref 3.8–10.5)

## 2023-05-08 DEVICE — GUIDE PIN 3.2X343MM: Type: IMPLANTABLE DEVICE | Site: RIGHT | Status: FUNCTIONAL

## 2023-05-08 DEVICE — SCREW LAG 85MM  / COMP SCREW 80MM: Type: IMPLANTABLE DEVICE | Site: RIGHT | Status: FUNCTIONAL

## 2023-05-08 DEVICE — SCREW TRIGEN LO PROF 5.0X37.5MM: Type: IMPLANTABLE DEVICE | Site: RIGHT | Status: FUNCTIONAL

## 2023-05-08 DEVICE — GUID ROD 3X1000MM: Type: IMPLANTABLE DEVICE | Site: RIGHT | Status: FUNCTIONAL

## 2023-05-08 DEVICE — IMPLANTABLE DEVICE: Type: IMPLANTABLE DEVICE | Site: RIGHT | Status: FUNCTIONAL

## 2023-05-08 RX ADMIN — Medication 975 MILLIGRAM(S): at 14:00

## 2023-05-08 RX ADMIN — Medication 975 MILLIGRAM(S): at 06:53

## 2023-05-08 RX ADMIN — RUFINAMIDE 2000 MILLIGRAM(S): 40 SUSPENSION ORAL at 20:24

## 2023-05-08 RX ADMIN — CHLORHEXIDINE GLUCONATE 1 APPLICATION(S): 213 SOLUTION TOPICAL at 17:22

## 2023-05-08 RX ADMIN — RUFINAMIDE 1200 MILLIGRAM(S): 40 SUSPENSION ORAL at 08:40

## 2023-05-08 RX ADMIN — Medication 975 MILLIGRAM(S): at 05:21

## 2023-05-08 RX ADMIN — SODIUM CHLORIDE 125 MILLILITER(S): 9 INJECTION INTRAMUSCULAR; INTRAVENOUS; SUBCUTANEOUS at 07:51

## 2023-05-08 RX ADMIN — OXYCODONE HYDROCHLORIDE 5 MILLIGRAM(S): 5 TABLET ORAL at 00:02

## 2023-05-08 RX ADMIN — LORATADINE 10 MILLIGRAM(S): 10 TABLET ORAL at 11:36

## 2023-05-08 RX ADMIN — Medication 975 MILLIGRAM(S): at 13:30

## 2023-05-08 NOTE — CONSULT NOTE ADULT - PROBLEM SELECTOR RECOMMENDATION 9
Patient scheduled for an Open reduction and internal fixation of the right hip  No contraindication to scheduled procedure  Patient is NPO  DVT and GI prophylaxis as per ortho

## 2023-05-08 NOTE — CONSULT NOTE ADULT - PROBLEM SELECTOR RECOMMENDATION 2
Patient is currently on rufinamide BID  states she has a few seizures a year  continue seizure precaution  Neuro eval as needed

## 2023-05-08 NOTE — ADVANCED PRACTICE NURSE CONSULT - RECOMMEDATIONS
Will recommend:  1. Topical therapy- sacral/bilateral buttocks- cleanse w/NS, pat dry & apply Allevyn foam dressing for 24hr from date of OR and then can change treatment to Darrel moisture barrier ointment twice daily and prn for incontinent episodes starting 5/9/23.   2. Incontinent management - incontinent cleanser, pads, pericare BID and as needed, continue with external female catheter.  3. Maintain on an alternating air with low air loss surface   4. Continue turning & repositioning every 2 hr  6. Complete Cair air fluidized boots; ensure that the soles of the feet are not resting on the foot board of the bed  7. Nutrition optimization  8. When OOB to  place seat cushion to ; limit time when seating in  (activity as per Ortho as pt going to OR today)  Discussed treatment plan w/staff RNDulce Maria, and pt.

## 2023-05-08 NOTE — CONSULT NOTE ADULT - SUBJECTIVE AND OBJECTIVE BOX
89 yo female history seizure disorder on Banzel, HLD, meningitis, IBS, mild dementia presents to the ED BIBEMS for reported mechanical fall c/o laceration to right scalp and right hip pain.   at bedside assist with history given mild dementia. States patient normally ambulates with walker/cane, but attempted to ambulate in the hallway today without either, he saw patient walking past him and then heard her fall immediately in the next room, reports went right to her and found her on the floor awake and alert, no bowel/bladder incontinence or seizure activity. Patient states fell onto right side and hit her head on nearby furniture, was unable to get up on her own so EMS was called.  Currently c/o bleeding from cut to right side of scalp and right side hip pain worse with attempted movement.  Denies LOC, speech/visual changes, neck pain, back pain, chest pain, shortness of breath, dizziness/lightheadedness, fever/chills/recent illness, numbness/tingling. Patient was brought to [facility] for further evaluation where she was foudn to have a right hip fracture. Pty is scheduled for an Open reduction and internal fixation of the right hip. 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 975 milliGRAM(s) Oral every 8 hours  chlorhexidine 2% Cloths 1 Application(s) Topical once  chlorhexidine 2% Cloths 1 Application(s) Topical every 12 hours  chlorhexidine 4% Liquid 1 Application(s) Topical once  chlorhexidine 4% Liquid 1 Application(s) Topical every 12 hours  latanoprost 0.005% Ophthalmic Solution 1 Drop(s) Both EYES at bedtime  loratadine 10 milliGRAM(s) Oral daily  rufinamide 1200 milliGRAM(s) Oral <User Schedule>  rufinamide 2000 milliGRAM(s) Oral <User Schedule>  senna 2 Tablet(s) Oral at bedtime  sodium chloride 0.9%. 1000 milliLiter(s) (125 mL/Hr) IV Continuous <Continuous>    MEDICATIONS  (PRN):  magnesium hydroxide Suspension 30 milliLiter(s) Oral daily PRN Constipation  oxyCODONE    IR 2.5 milliGRAM(s) Oral every 4 hours PRN Moderate Pain (4 - 6)  oxyCODONE    IR 5 milliGRAM(s) Oral every 4 hours PRN Severe Pain (7 - 10)    Social Hx:  Tobacco: Neg  ETOH: Rarely  Drugs: Neg    Family Hx:  As per my conversation with the patient, non contributory       ROS  CONSTITUTIONAL: No weakness, fevers or chills  EYES/ENT: No visual changes;  No vertigo or throat pain   NECK: No pain or stiffness  RESPIRATORY: No cough, wheezing, hemoptysis; No shortness of breath  CARDIOVASCULAR: No chest pain or palpitations  GASTROINTESTINAL: No abdominal or epigastric pain. No nausea, vomiting, or hematemesis; No diarrhea or constipation. No melena or hematochezia.  GENITOURINARY: No dysuria, frequency or hematuria  NEUROLOGICAL: No numbness or weakness  SKIN: No itching, burning, rashes, or lesions   MUSCULOSKELETAL: right leg pain    INTERVAL HPI/OVERNIGHT EVENTS:  T(C): 36.4 (05-08-23 @ 05:22), Max: 37.1 (05-07-23 @ 15:00)  HR: 105 (05-08-23 @ 05:22) (77 - 108)  BP: 123/75 (05-08-23 @ 05:22) (100/69 - 124/77)  RR: 18 (05-08-23 @ 05:22) (16 - 18)  SpO2: 94% (05-08-23 @ 05:22) (94% - 100%)  Wt(kg): --  I&O's Summary      PHYSICAL EXAM:  GENERAL: NAD, well-groomed, well-developed  HEAD:  Atraumatic, Normocephalic  EYES: EOMI, PERRLA, conjunctiva and sclera clear  ENMT: No tonsillar erythema, exudates, or enlargement; Moist mucous membranes, Good dentition, No lesions  NECK: Supple, No JVD, Normal thyroid  NERVOUS SYSTEM:  Alert & Oriented X2-3,  CHEST/LUNG: Clear to percussion bilaterally; No rales, rhonchi, wheezing, or rubs  HEART: Regular rate and rhythm; No murmurs, rubs, or gallops  ABDOMEN: Soft, Nontender, Nondistended; Bowel sounds present  EXTREMITIES:  2+ Peripheral Pulses, No clubbing, cyanosis, or edema  LYMPH: No lymphadenopathy noted  SKIN: No rashes or lesions        LABS:                        11.8   12.71 )-----------( 277      ( 08 May 2023 04:30 )             36.6     05-08    141  |  104  |  20  ----------------------------<  127<H>  4.4   |  24  |  0.77    Ca    9.4      08 May 2023 04:30    TPro  7.2  /  Alb  3.7  /  TBili  0.2  /  DBili  x   /  AST  27  /  ALT  15  /  AlkPhos  86  05-07    PT/INR - ( 08 May 2023 04:30 )   PT: 13.9 sec;   INR: 1.20 ratio         PTT - ( 08 May 2023 04:30 )  PTT:26.6 sec    EKG NSR @ 94 BPM

## 2023-05-08 NOTE — CONSULT NOTE ADULT - ASSESSMENT
89 yo woman presents after a fall at home with a right hip fracture Patient is scheduled for an Open reduction and internal fixation of the right hip

## 2023-05-08 NOTE — ADVANCED PRACTICE NURSE CONSULT - REASON FOR CONSULT
Requested by staff to assess skin status of pt admitted with skin breakdown.  PMH is noted as obtained by chart review:  89y Female presents s/p mech fall c/o severe right hip pain and inability to ambulate.  Patient denies headstrike or LOC. Patient denies radiation of pain. Patient denies numbness/tingling/burning in the RLE. No other bone/joint complaints. Patient is a community ambulator at baseline with/without assistive devices.    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

## 2023-05-08 NOTE — ADVANCED PRACTICE NURSE CONSULT - ASSESSMENT
Consult received & events noted to date. The pt was encountered on 7T-Ms. Schwartz is alert and oriented. Introduced self & role of CWOCN to pt.  Pt requires assistance w/turning & repositioning as s/p mechanical fall and now w/R hip fracture. Pt is on an alternating air with low air loss support surface. Pt is pleasant & cooperative and endorses chain of events that led to admission to hospital . Pt scheduled for surgery today. Pt is incontinent of urine and an external female catheter in place and per pt, her last BM was prior to admission and able to make needs known. On exam noted to sacrum/bilateral buttocks is a 6cm L x 3cm W x 0cm deep area of intact dusky maroon discoloration. Pt w/bony coccyx. Pt endorses tenderness/discomfort to coccygeal area.  Given presentation, location & hx of fall with difficulty ambulating following the injury, findings suggest that there may be a component of deep tissue injury present on admission. Will recommend  to continue to monitor & continue to apply Allevyn foam dressing to cushion & protect the skin for 24hr as pt is scheduled for the OR today. As pt is incontinent of urine, will recommend to change treatment to Darrel moisture barrier ointment on 5/9/23 to lay down a protective coating and protect skin.  As pt was admitted with a pressure injury, a dietician consult is pending.  Education was provided to pt regarding interventions to prevent pressure injuries including turning & repositioning every 2hr, use of seat cushion when OOB to ch (activity as per Ortho), limiting time when in chair, mobility (as able), & nutrition. Pt expresses understanding using verbal teach back.

## 2023-05-09 ENCOUNTER — TRANSCRIPTION ENCOUNTER (OUTPATIENT)
Age: 88
End: 2023-05-09

## 2023-05-09 DIAGNOSIS — S72.001A FRACTURE OF UNSPECIFIED PART OF NECK OF RIGHT FEMUR, INITIAL ENCOUNTER FOR CLOSED FRACTURE: ICD-10-CM

## 2023-05-09 LAB
ANION GAP SERPL CALC-SCNC: 12 MMOL/L — SIGNIFICANT CHANGE UP (ref 5–17)
ANION GAP SERPL CALC-SCNC: 13 MMOL/L — SIGNIFICANT CHANGE UP (ref 5–17)
ANION GAP SERPL CALC-SCNC: 9 MMOL/L — SIGNIFICANT CHANGE UP (ref 5–17)
BUN SERPL-MCNC: 10 MG/DL — SIGNIFICANT CHANGE UP (ref 7–23)
BUN SERPL-MCNC: 12 MG/DL — SIGNIFICANT CHANGE UP (ref 7–23)
BUN SERPL-MCNC: 14 MG/DL — SIGNIFICANT CHANGE UP (ref 7–23)
CALCIUM SERPL-MCNC: 5.6 MG/DL — CRITICAL LOW (ref 8.4–10.5)
CALCIUM SERPL-MCNC: 8 MG/DL — LOW (ref 8.4–10.5)
CALCIUM SERPL-MCNC: 8 MG/DL — LOW (ref 8.4–10.5)
CHLORIDE SERPL-SCNC: 109 MMOL/L — HIGH (ref 96–108)
CHLORIDE SERPL-SCNC: 111 MMOL/L — HIGH (ref 96–108)
CHLORIDE SERPL-SCNC: 120 MMOL/L — HIGH (ref 96–108)
CO2 SERPL-SCNC: 18 MMOL/L — LOW (ref 22–31)
CO2 SERPL-SCNC: 21 MMOL/L — LOW (ref 22–31)
CO2 SERPL-SCNC: 22 MMOL/L — SIGNIFICANT CHANGE UP (ref 22–31)
CREAT SERPL-MCNC: 0.43 MG/DL — LOW (ref 0.5–1.3)
CREAT SERPL-MCNC: 0.59 MG/DL — SIGNIFICANT CHANGE UP (ref 0.5–1.3)
CREAT SERPL-MCNC: 0.62 MG/DL — SIGNIFICANT CHANGE UP (ref 0.5–1.3)
EGFR: 85 ML/MIN/1.73M2 — SIGNIFICANT CHANGE UP
EGFR: 86 ML/MIN/1.73M2 — SIGNIFICANT CHANGE UP
EGFR: 92 ML/MIN/1.73M2 — SIGNIFICANT CHANGE UP
GLUCOSE SERPL-MCNC: 105 MG/DL — HIGH (ref 70–99)
GLUCOSE SERPL-MCNC: 180 MG/DL — HIGH (ref 70–99)
GLUCOSE SERPL-MCNC: 80 MG/DL — SIGNIFICANT CHANGE UP (ref 70–99)
HCT VFR BLD CALC: 24.3 % — LOW (ref 34.5–45)
HCT VFR BLD CALC: 28.1 % — LOW (ref 34.5–45)
HGB BLD-MCNC: 7.7 G/DL — LOW (ref 11.5–15.5)
HGB BLD-MCNC: 9 G/DL — LOW (ref 11.5–15.5)
MCHC RBC-ENTMCNC: 29.7 PG — SIGNIFICANT CHANGE UP (ref 27–34)
MCHC RBC-ENTMCNC: 30.4 PG — SIGNIFICANT CHANGE UP (ref 27–34)
MCHC RBC-ENTMCNC: 31.7 GM/DL — LOW (ref 32–36)
MCHC RBC-ENTMCNC: 32 GM/DL — SIGNIFICANT CHANGE UP (ref 32–36)
MCV RBC AUTO: 92.7 FL — SIGNIFICANT CHANGE UP (ref 80–100)
MCV RBC AUTO: 96 FL — SIGNIFICANT CHANGE UP (ref 80–100)
NRBC # BLD: 0 /100 WBCS — SIGNIFICANT CHANGE UP (ref 0–0)
NRBC # BLD: 0 /100 WBCS — SIGNIFICANT CHANGE UP (ref 0–0)
PLATELET # BLD AUTO: 161 K/UL — SIGNIFICANT CHANGE UP (ref 150–400)
PLATELET # BLD AUTO: 203 K/UL — SIGNIFICANT CHANGE UP (ref 150–400)
POTASSIUM SERPL-MCNC: 2.9 MMOL/L — CRITICAL LOW (ref 3.5–5.3)
POTASSIUM SERPL-MCNC: 3.8 MMOL/L — SIGNIFICANT CHANGE UP (ref 3.5–5.3)
POTASSIUM SERPL-MCNC: 4.3 MMOL/L — SIGNIFICANT CHANGE UP (ref 3.5–5.3)
POTASSIUM SERPL-SCNC: 2.9 MMOL/L — CRITICAL LOW (ref 3.5–5.3)
POTASSIUM SERPL-SCNC: 3.8 MMOL/L — SIGNIFICANT CHANGE UP (ref 3.5–5.3)
POTASSIUM SERPL-SCNC: 4.3 MMOL/L — SIGNIFICANT CHANGE UP (ref 3.5–5.3)
RBC # BLD: 2.53 M/UL — LOW (ref 3.8–5.2)
RBC # BLD: 3.03 M/UL — LOW (ref 3.8–5.2)
RBC # FLD: 14 % — SIGNIFICANT CHANGE UP (ref 10.3–14.5)
RBC # FLD: 14 % — SIGNIFICANT CHANGE UP (ref 10.3–14.5)
SODIUM SERPL-SCNC: 143 MMOL/L — SIGNIFICANT CHANGE UP (ref 135–145)
SODIUM SERPL-SCNC: 145 MMOL/L — SIGNIFICANT CHANGE UP (ref 135–145)
SODIUM SERPL-SCNC: 147 MMOL/L — HIGH (ref 135–145)
WBC # BLD: 12.78 K/UL — HIGH (ref 3.8–10.5)
WBC # BLD: 13.72 K/UL — HIGH (ref 3.8–10.5)
WBC # FLD AUTO: 12.78 K/UL — HIGH (ref 3.8–10.5)
WBC # FLD AUTO: 13.72 K/UL — HIGH (ref 3.8–10.5)

## 2023-05-09 RX ORDER — FLUOROMETHOLONE 1 MG/ML
1 SOLUTION/ DROPS OPHTHALMIC DAILY
Refills: 0 | Status: DISCONTINUED | OUTPATIENT
Start: 2023-05-09 | End: 2023-05-12

## 2023-05-09 RX ORDER — OXYCODONE HYDROCHLORIDE 5 MG/1
2.5 TABLET ORAL
Refills: 0 | Status: DISCONTINUED | OUTPATIENT
Start: 2023-05-09 | End: 2023-05-12

## 2023-05-09 RX ORDER — CEFAZOLIN SODIUM 1 G
2000 VIAL (EA) INJECTION EVERY 8 HOURS
Refills: 0 | Status: COMPLETED | OUTPATIENT
Start: 2023-05-09 | End: 2023-05-09

## 2023-05-09 RX ORDER — RUFINAMIDE 40 MG/ML
300 SUSPENSION ORAL
Refills: 0 | Status: DISCONTINUED | OUTPATIENT
Start: 2023-05-09 | End: 2023-05-09

## 2023-05-09 RX ORDER — ACETAMINOPHEN 500 MG
975 TABLET ORAL EVERY 8 HOURS
Refills: 0 | Status: DISCONTINUED | OUTPATIENT
Start: 2023-05-09 | End: 2023-05-12

## 2023-05-09 RX ORDER — MAGNESIUM HYDROXIDE 400 MG/1
30 TABLET, CHEWABLE ORAL DAILY
Refills: 0 | Status: DISCONTINUED | OUTPATIENT
Start: 2023-05-09 | End: 2023-05-12

## 2023-05-09 RX ORDER — SENNA PLUS 8.6 MG/1
2 TABLET ORAL AT BEDTIME
Refills: 0 | Status: DISCONTINUED | OUTPATIENT
Start: 2023-05-09 | End: 2023-05-12

## 2023-05-09 RX ORDER — HYDROMORPHONE HYDROCHLORIDE 2 MG/ML
0.5 INJECTION INTRAMUSCULAR; INTRAVENOUS; SUBCUTANEOUS ONCE
Refills: 0 | Status: DISCONTINUED | OUTPATIENT
Start: 2023-05-09 | End: 2023-05-12

## 2023-05-09 RX ORDER — SODIUM CHLORIDE 9 MG/ML
1000 INJECTION, SOLUTION INTRAVENOUS
Refills: 0 | Status: DISCONTINUED | OUTPATIENT
Start: 2023-05-10 | End: 2023-05-12

## 2023-05-09 RX ORDER — ACETAMINOPHEN 500 MG
1000 TABLET ORAL ONCE
Refills: 0 | Status: COMPLETED | OUTPATIENT
Start: 2023-05-09 | End: 2023-05-09

## 2023-05-09 RX ORDER — ENOXAPARIN SODIUM 100 MG/ML
40 INJECTION SUBCUTANEOUS EVERY 24 HOURS
Refills: 0 | Status: DISCONTINUED | OUTPATIENT
Start: 2023-05-09 | End: 2023-05-12

## 2023-05-09 RX ORDER — TRAMADOL HYDROCHLORIDE 50 MG/1
25 TABLET ORAL EVERY 6 HOURS
Refills: 0 | Status: DISCONTINUED | OUTPATIENT
Start: 2023-05-09 | End: 2023-05-12

## 2023-05-09 RX ORDER — ONDANSETRON 8 MG/1
4 TABLET, FILM COATED ORAL ONCE
Refills: 0 | Status: COMPLETED | OUTPATIENT
Start: 2023-05-09 | End: 2023-05-09

## 2023-05-09 RX ORDER — POLYETHYLENE GLYCOL 3350 17 G/17G
17 POWDER, FOR SOLUTION ORAL AT BEDTIME
Refills: 0 | Status: DISCONTINUED | OUTPATIENT
Start: 2023-05-09 | End: 2023-05-12

## 2023-05-09 RX ORDER — HYDROMORPHONE HYDROCHLORIDE 2 MG/ML
0.25 INJECTION INTRAMUSCULAR; INTRAVENOUS; SUBCUTANEOUS
Refills: 0 | Status: DISCONTINUED | OUTPATIENT
Start: 2023-05-09 | End: 2023-05-09

## 2023-05-09 RX ORDER — ONDANSETRON 8 MG/1
4 TABLET, FILM COATED ORAL EVERY 6 HOURS
Refills: 0 | Status: DISCONTINUED | OUTPATIENT
Start: 2023-05-09 | End: 2023-05-12

## 2023-05-09 RX ORDER — OXYCODONE HYDROCHLORIDE 5 MG/1
5 TABLET ORAL
Refills: 0 | Status: DISCONTINUED | OUTPATIENT
Start: 2023-05-09 | End: 2023-05-12

## 2023-05-09 RX ORDER — RUFINAMIDE 40 MG/ML
400 SUSPENSION ORAL
Refills: 0 | Status: DISCONTINUED | OUTPATIENT
Start: 2023-05-09 | End: 2023-05-09

## 2023-05-09 RX ADMIN — HYDROMORPHONE HYDROCHLORIDE 0.25 MILLIGRAM(S): 2 INJECTION INTRAMUSCULAR; INTRAVENOUS; SUBCUTANEOUS at 00:55

## 2023-05-09 RX ADMIN — Medication 100 MILLIGRAM(S): at 13:37

## 2023-05-09 RX ADMIN — ONDANSETRON 4 MILLIGRAM(S): 8 TABLET, FILM COATED ORAL at 01:17

## 2023-05-09 RX ADMIN — Medication 400 MILLIGRAM(S): at 01:32

## 2023-05-09 RX ADMIN — TRAMADOL HYDROCHLORIDE 25 MILLIGRAM(S): 50 TABLET ORAL at 06:43

## 2023-05-09 RX ADMIN — HYDROMORPHONE HYDROCHLORIDE 0.25 MILLIGRAM(S): 2 INJECTION INTRAMUSCULAR; INTRAVENOUS; SUBCUTANEOUS at 01:25

## 2023-05-09 RX ADMIN — TRAMADOL HYDROCHLORIDE 25 MILLIGRAM(S): 50 TABLET ORAL at 05:43

## 2023-05-09 RX ADMIN — Medication 1 TABLET(S): at 12:02

## 2023-05-09 RX ADMIN — Medication 100 MILLIGRAM(S): at 05:45

## 2023-05-09 RX ADMIN — Medication 1000 MILLIGRAM(S): at 02:01

## 2023-05-09 RX ADMIN — RUFINAMIDE 2000 MILLIGRAM(S): 40 SUSPENSION ORAL at 21:28

## 2023-05-09 RX ADMIN — ENOXAPARIN SODIUM 40 MILLIGRAM(S): 100 INJECTION SUBCUTANEOUS at 06:30

## 2023-05-09 RX ADMIN — LORATADINE 10 MILLIGRAM(S): 10 TABLET ORAL at 12:02

## 2023-05-09 NOTE — PHYSICAL THERAPY INITIAL EVALUATION ADULT - ACTIVE RANGE OF MOTION EXAMINATION, REHAB EVAL
RLE not tested 2/2 surgery/bilateral upper extremity Active ROM was WFL (within functional limits)/Left LE Active ROM was WFL (within functional limits)

## 2023-05-09 NOTE — DISCHARGE NOTE PROVIDER - NSDCFUADDINST_GEN_ALL_CORE_FT
DEEP VEIN THROMBOSIS PROPHYLAXIS: Lovenox x 6 weeks post op  WOUND CARE: Keep dressing clean dry and intact. Dressing and staples if applicable to be removed on POD #14, replaced with steri strips to the incision.  WB status: WBAT with operative leg with rolling walker.   FOLLOW-UP:  Follow-up with Dr. Abbott within 2 weeks of discharge from rehab. Call above number for appointment  Please follow up with your primary care physician regarding your hospitalization within one month of your discharge.

## 2023-05-09 NOTE — PHYSICAL THERAPY INITIAL EVALUATION ADULT - RANGE OF MOTION EXAMINATION, REHAB EVAL
RLE not tested 2/2 surgery/bilateral upper extremity ROM was WFL (within functional limits)/Left LE ROM was WFL (within functional limits)

## 2023-05-09 NOTE — PHYSICAL THERAPY INITIAL EVALUATION ADULT - ADDITIONAL COMMENTS
Patient is a community ambulator at baseline with/without assistive devices. Patient is a community ambulator at baseline with/without assistive devices. lives in elevator apartment + assistance as needed provided by family and home attendent.. supervision/Min A for mobility/ADL's/.

## 2023-05-09 NOTE — DISCHARGE NOTE PROVIDER - INSTRUCTIONS
1. Continue Kosher Regular diet. Defer diet/texture modification to medical team/SLP as indicated   2. Recommend to increase mighty shakes to 3x daily to help optimize PO intakes  3. Consider adding vitamin C supplement if no medical contraindications to aid in wound healing; continue Multivitamin as prescribed   4. Encourage PO intakes as tolerated. Honor food preferences as appropriate and available. Staff to provide assistance with meals as needed

## 2023-05-09 NOTE — DISCHARGE NOTE PROVIDER - NSDCFUADDAPPT_GEN_ALL_CORE_FT
Please follow up with your primary care physician regarding your hospitalization within one month of your discharge.

## 2023-05-09 NOTE — OCCUPATIONAL THERAPY INITIAL EVALUATION ADULT - LIVES WITH, PROFILE
Pt lives with spouse in [facility] with no CRISTINO and elevator access up. Pt has a walk in shower with shower chair. Pt has a HHA 24 hrs/day 5 days/week./spouse

## 2023-05-09 NOTE — PHYSICAL THERAPY INITIAL EVALUATION ADULT - PERTINENT HX OF CURRENT PROBLEM, REHAB EVAL
Pt is 89y.o. F PMH seizure disorder on Banzel, HLD, meningitis, IBS, [facility] severe right [facility] pain and inability to ambulate.  Patient denies headstrike or LOC. Patient denies radiation of pain. Patient denies numbness/tingling/burning in the RLE. No other bone/joint complaints. Hospital course: Pt found to have R IT femoral fx and is s/p IMN R femur

## 2023-05-09 NOTE — PHYSICAL THERAPY INITIAL EVALUATION ADULT - NSPTDISCHREC_GEN_A_CORE
Sub-acute Rehab if pt is to go home, will require assistance with all functional mobility hospital bed, anselmo lift and WC and home PT/Sub-acute Rehab

## 2023-05-09 NOTE — DISCHARGE NOTE PROVIDER - DETAILS OF MALNUTRITION DIAGNOSIS/DIAGNOSES
This patient has been assessed with a concern for Malnutrition and was treated during this hospitalization for the following Nutrition diagnosis/diagnoses:     -  05/10/2023: Severe protein-calorie malnutrition

## 2023-05-09 NOTE — DISCHARGE NOTE PROVIDER - NSDCMRMEDTOKEN_GEN_ALL_CORE_FT
acetaminophen 325 mg oral tablet: 2 tab(s) orally every 6 hours, As needed, Temp greater or equal to 38C (100.4F), Mild Pain (1 - 3)  rufinamide 200 mg oral tablet: 300 milligram(s) orally once a day (in the morning)  rufinamide 400 mg oral tablet: orally once a day (in the evening)   acetaminophen 325 mg oral tablet: 3 tab(s) orally every 8 hours  acetaminophen 325 mg oral tablet: 2 tab(s) orally every 6 hours, As needed, Temp greater or equal to 38C (100.4F), Mild Pain (1 - 3)  Hospital Bed: s/p R Hip IMN   MANNY=99 mon  Jcarlos Lift: s/p R Hip IMN   MANNY=99 mon  rufinamide 200 mg oral tablet: 300 milligram(s) orally once a day (in the morning)  rufinamide 400 mg oral tablet: orally once a day (in the evening)  Standard Wheelchair: s/p R Hip IMN   MANNY=99 mon   acetaminophen 325 mg oral tablet: 3 tab(s) orally every 8 hours  aluminum hydroxide-magnesium hydroxide 200 mg-200 mg/5 mL oral suspension: 30 milliliter(s) orally 4 times a day As needed Indigestion  enoxaparin: 40 milligram(s) subcutaneous once a day x 6 weeks post op for DVT ppx  fluorometholone 0.1% ophthalmic suspension: 1 drop(s) to each affected eye once a day  latanoprost 0.005% ophthalmic solution: 1 drop(s) to each affected eye once a day (at bedtime)  loratadine 10 mg oral tablet: 1 tab(s) orally once a day  magnesium hydroxide 8% oral suspension: 30 milliliter(s) orally once a day As needed Constipation  Multiple Vitamins oral tablet: 1 tab(s) orally once a day  oxyCODONE: 2.5 milligram(s) orally every 4 hours as needed for  moderate pain  oxyCODONE 5 mg oral tablet: 1 tab(s) orally every 4 hours as needed for Severe Pain (7 - 10)  polyethylene glycol 3350 oral powder for reconstitution: 17 gram(s) orally once a day (at bedtime)  rufinamide 200 mg oral tablet: 300 milligram(s) orally once a day (in the morning)  rufinamide 400 mg oral tablet: orally once a day (in the evening)  senna leaf extract oral tablet: 2 tab(s) orally once a day (at bedtime)  traMADol 50 mg oral tablet: 0.5 tab(s) orally every 6 hours As needed Mild Pain (1 - 3)

## 2023-05-09 NOTE — PHYSICAL THERAPY INITIAL EVALUATION ADULT - SITTING BALANCE: DYNAMIC
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 Awake and alert.   CV: HDS but hypertensive, Vasotec IV q6 and Labetalol, hydralazine PRN, will discuss resuming home meds through NG    Pulm: Extubated, doing well on NC  FENGI: NPO/NG (800 OP),  Ileostomy pink, no OP yet    : d/c martínez 4/2   ID: zosyn (3/30 - )  Endo: ISS , lantus 14  FEN: LR @ 100  PPx: SCDs, SQH  PT/OT: not ordered  Lines: RAMY Hutchison (3/31-4/1) good balance

## 2023-05-09 NOTE — BRIEF OPERATIVE NOTE - OPERATION/FINDINGS
R IT fracture  [employer] Intertan  360mm nail  85 lag screw  80 interlock  37.5 distal locking screw, dynamic

## 2023-05-09 NOTE — PHYSICAL THERAPY INITIAL EVALUATION ADULT - IMPAIRMENTS FOUND, PT EVAL
aerobic capacity/endurance/arousal, attention, and cognition/gait, locomotion, and balance/joint integrity and mobility/muscle strength

## 2023-05-09 NOTE — DISCHARGE NOTE PROVIDER - CARE PROVIDER_API CALL
Ryley Abbott (MD)  Orthopaedic Surgery  [address] [place] [address], [address]  [place] [address]  Phone: [phone]  Fax: [phone]  Follow Up Time: 2 weeks

## 2023-05-09 NOTE — DISCHARGE NOTE PROVIDER - NSDCCPCAREPLAN_GEN_ALL_CORE_FT
PRINCIPAL DISCHARGE DIAGNOSIS  Diagnosis: Hip fracture, right  Assessment and Plan of Treatment:

## 2023-05-09 NOTE — DISCHARGE NOTE PROVIDER - HOSPITAL COURSE
History of Present Illness:  89 yo female history seizure disorder on Banzel, HLD, meningitis, IBS, mild dementia presents to the ED BIBEMS for reported mechanical fall c/o laceration to right scalp and right hip pain.   at bedside assist with history given mild dementia. States patient normally ambulates with walker/cane, but attempted to ambulate in the hallway today without either, he saw patient walking past him and then heard her fall immediately in the next room, reports went right to her and found her on the floor awake and alert, no bowel/bladder incontinence or seizure activity. Patient states fell onto right side and hit her head on nearby furniture, was unable to get up on her own so EMS was called.  Currently c/o bleeding from cut to right side of scalp and right side hip pain worse with attempted movement.  Denies LOC, speech/visual changes, neck pain, back pain, chest pain, shortness of breath, dizziness/lightheadedness, fever/chills/recent illness, numbness/tingling. Patient was brought to [facility] for further evaluation where she was foudn to have a right hip fracture. Pty is scheduled for an Open reduction and internal fixation of the right hip. 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 Abbott. Patient tolerated the procedure well and was transferred to the recovery room in stable condition, with a stable neuro / vascular exam of the operated extremity.    Patient was placed on Lovenox for DVT ppx, and was placed on Protonix for GI protection.   Patient was made weight bearing as tolerated with the operative leg.     Patient evaluated by PT/OT and recommended for disposition for XXXX    Discharge and Orthopedic Care instructions were delineated in the Discharge Plan and reviewed with the patient. All medications were delineated in the medication reconciliation tool and key points were reviewed with the patient. They were deemed stable from an Orthopedic & medical standpoint for discharge *** History of Present Illness:  89 yo female history seizure disorder on Banzel, HLD, meningitis, IBS, mild dementia presents to the ED BIBEMS for reported mechanical fall c/o laceration to right scalp and right hip pain.   at bedside assist with history given mild dementia. States patient normally ambulates with walker/cane, but attempted to ambulate in the hallway today without either, he saw patient walking past him and then heard her fall immediately in the next room, reports went right to her and found her on the floor awake and alert, no bowel/bladder incontinence or seizure activity. Patient states fell onto right side and hit her head on nearby furniture, was unable to get up on her own so EMS was called.  Currently c/o bleeding from cut to right side of scalp and right side hip pain worse with attempted movement.  Denies LOC, speech/visual changes, neck pain, back pain, chest pain, shortness of breath, dizziness/lightheadedness, fever/chills/recent illness, numbness/tingling. Patient was brought to [facility] for further evaluation where she was foudn to have a right hip fracture. Pty is scheduled for an Open reduction and internal fixation of the right hip. 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 Abbott. Patient tolerated the procedure well and was transferred to the recovery room in stable condition, with a stable neuro / vascular exam of the operated extremity.  Patient received 1U PRBC for ABLA on 5/9.   Responded appropriately.     Patient was placed on Lovenox for DVT ppx, and was placed on Protonix for GI protection.   Patient was made weight bearing as tolerated with the operative leg.     Patient evaluated by PT/OT and recommended for disposition for subacute rehab.     Discharge and Orthopedic Care instructions were delineated in the Discharge Plan and reviewed with the patient. All medications were delineated in the medication reconciliation tool and key points were reviewed with the patient. They were deemed stable from an Orthopedic & medical standpoint for discharge.

## 2023-05-10 LAB
ANION GAP SERPL CALC-SCNC: 13 MMOL/L — SIGNIFICANT CHANGE UP (ref 5–17)
BUN SERPL-MCNC: 16 MG/DL — SIGNIFICANT CHANGE UP (ref 7–23)
CALCIUM SERPL-MCNC: 8.2 MG/DL — LOW (ref 8.4–10.5)
CHLORIDE SERPL-SCNC: 107 MMOL/L — SIGNIFICANT CHANGE UP (ref 96–108)
CO2 SERPL-SCNC: 21 MMOL/L — LOW (ref 22–31)
CREAT SERPL-MCNC: 0.84 MG/DL — SIGNIFICANT CHANGE UP (ref 0.5–1.3)
EGFR: 66 ML/MIN/1.73M2 — SIGNIFICANT CHANGE UP
GLUCOSE SERPL-MCNC: 230 MG/DL — HIGH (ref 70–99)
HCT VFR BLD CALC: 29.8 % — LOW (ref 34.5–45)
HGB BLD-MCNC: 10 G/DL — LOW (ref 11.5–15.5)
MCHC RBC-ENTMCNC: 30.1 PG — SIGNIFICANT CHANGE UP (ref 27–34)
MCHC RBC-ENTMCNC: 33.6 GM/DL — SIGNIFICANT CHANGE UP (ref 32–36)
MCV RBC AUTO: 89.8 FL — SIGNIFICANT CHANGE UP (ref 80–100)
NRBC # BLD: 0 /100 WBCS — SIGNIFICANT CHANGE UP (ref 0–0)
PLATELET # BLD AUTO: 199 K/UL — SIGNIFICANT CHANGE UP (ref 150–400)
POTASSIUM SERPL-MCNC: 4 MMOL/L — SIGNIFICANT CHANGE UP (ref 3.5–5.3)
POTASSIUM SERPL-SCNC: 4 MMOL/L — SIGNIFICANT CHANGE UP (ref 3.5–5.3)
RBC # BLD: 3.32 M/UL — LOW (ref 3.8–5.2)
RBC # FLD: 14.9 % — HIGH (ref 10.3–14.5)
SODIUM SERPL-SCNC: 141 MMOL/L — SIGNIFICANT CHANGE UP (ref 135–145)
WBC # BLD: 11.52 K/UL — HIGH (ref 3.8–10.5)
WBC # FLD AUTO: 11.52 K/UL — HIGH (ref 3.8–10.5)

## 2023-05-10 RX ADMIN — Medication 975 MILLIGRAM(S): at 17:07

## 2023-05-10 RX ADMIN — ENOXAPARIN SODIUM 40 MILLIGRAM(S): 100 INJECTION SUBCUTANEOUS at 05:38

## 2023-05-10 RX ADMIN — LATANOPROST 1 DROP(S): 0.05 SOLUTION/ DROPS OPHTHALMIC; TOPICAL at 21:11

## 2023-05-10 RX ADMIN — POLYETHYLENE GLYCOL 3350 17 GRAM(S): 17 POWDER, FOR SOLUTION ORAL at 20:59

## 2023-05-10 RX ADMIN — Medication 975 MILLIGRAM(S): at 08:29

## 2023-05-10 RX ADMIN — LORATADINE 10 MILLIGRAM(S): 10 TABLET ORAL at 11:20

## 2023-05-10 RX ADMIN — RUFINAMIDE 1200 MILLIGRAM(S): 40 SUSPENSION ORAL at 09:01

## 2023-05-10 RX ADMIN — Medication 1 TABLET(S): at 11:19

## 2023-05-10 RX ADMIN — Medication 975 MILLIGRAM(S): at 09:00

## 2023-05-10 RX ADMIN — SENNA PLUS 2 TABLET(S): 8.6 TABLET ORAL at 20:59

## 2023-05-10 RX ADMIN — FLUOROMETHOLONE 1 DROP(S): 1 SOLUTION/ DROPS OPHTHALMIC at 12:00

## 2023-05-10 RX ADMIN — RUFINAMIDE 2000 MILLIGRAM(S): 40 SUSPENSION ORAL at 20:59

## 2023-05-10 RX ADMIN — Medication 975 MILLIGRAM(S): at 18:00

## 2023-05-10 NOTE — DIETITIAN INITIAL EVALUATION ADULT - REASON INDICATOR FOR ASSESSMENT
Stage 2 Pressure Injury or greater  Information obtained from: Review of pt's current medical record, interview with pt in her assigned room on [facility]( pt noted with dementia, however she was able to confirm her name and  and was able to answer RD questions)

## 2023-05-10 NOTE — DIETITIAN INITIAL EVALUATION ADULT - OTHER CALCULATIONS
-- Defer fluid needs to medical team  -- Estimated calorie/protein needs are based on IBW of 117.5 pounds

## 2023-05-10 NOTE — DIETITIAN NUTRITION RISK NOTIFICATION - TREATMENT: THE FOLLOWING DIET HAS BEEN RECOMMENDED
Diet, Regular:   Kosher (05-09-23 @ 08:51) [Active]  Diet, Clear Liquid (05-09-23 @ 00:54) [Available for Activation]  Diet, NPO:   NPO for Procedure/Test     NPO Start Date: 08-May-2023,   NPO Start Time: 00:00  Except Medications (05-07-23 @ 23:33) [Active]

## 2023-05-10 NOTE — DIETITIAN INITIAL EVALUATION ADULT - NSPROEDAABILITYLEARN_GEN_A_NUR
cognitive limitations Oxybutynin Pregnancy And Lactation Text: This medication is Pregnancy Category B and is considered safe during pregnancy. It is unknown if it is excreted in breast milk.

## 2023-05-10 NOTE — PROVIDER CONTACT NOTE (OTHER) - ASSESSMENT
PT A&OX2. Pt arrived to floor with bedpan left underneath patient leaving 10x3cm red randy across thigh. Upon arrival, bedpan removed, cavillon applied. TRAE Ortiz made aware.
BP stable, tachycardic @ 118, O2 stat 90. pt denies chest pain, SOB, & dizziness

## 2023-05-10 NOTE — DIETITIAN INITIAL EVALUATION ADULT - ADD RECOMMEND
1. Continue Kosher Regular diet. Defer diet/texture modification to medical team/SLP as indicated   2. Recommend to increase mighty shakes from 1x/Day to 3x/day to help optimize PO intakes  3. Consider adding vitamin C supplement if no medical contraindications to aid in wound healing; continue Multivitamin as prescribed   4. Encourage PO intakes as tolerated. Honor food preferences as appropriate and available. Staff to provide assistance with meals as needed  5. Monitor PO intake, GI tolerance, skin integrity and labs. RD remains available if needed, pt is aware.   6. Malnutrition Sticker placed in pt. chart

## 2023-05-10 NOTE — DIETITIAN INITIAL EVALUATION ADULT - EDUCATION DIETARY MODIFICATIONS
Discussed importance of adequate consumption of meals/supplements to optimize protein-energy intake. Encouraged small/frequent meals, nutrient dense snacks, prioritizing protein foods at meal time./(1) partially meets; needs review/practice/verbalization

## 2023-05-10 NOTE — DIETITIAN INITIAL EVALUATION ADULT - ORAL INTAKE PTA/DIET HISTORY
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 Menu profile updated with allergies. Reports having a good appetite and PO intakes at home. Did not follow any specific dietary restrictions. Pt denies nausea, vomiting, diarrhea, or constipation. Denies difficulty chewing/swallowing. Denies any vitamin/mineral use at home, pt reports not taking oral nutritional supplement at home as well.

## 2023-05-10 NOTE — DIETITIAN INITIAL EVALUATION ADULT - PERTINENT LABORATORY DATA
05-09    143  |  109<H>  |  12  ----------------------------<  180<H>  4.3   |  22  |  0.62    Ca    8.0<L>      09 May 2023 09:47

## 2023-05-10 NOTE — DIETITIAN INITIAL EVALUATION ADULT - REASON FOR ADMISSION
Chart Reviewed, Events Noted  "89[facility] and inability to ambulate.  Patient denies headstrike or LOC. Patient denies radiation of pain. Patient denies numbness/tingling/burning in the RLE. No other bone/joint complaints. Patient is a community ambulator at baseline with/without assistive devices."

## 2023-05-10 NOTE — CHART NOTE - NSCHARTNOTEFT_GEN_A_CORE
Due to the nature of this patient's injuries s/p R IM Nail ORIF,  Patient will require a standard wheelchair.  This is to assist with performing activities of daily living not able to be performed with other assistive equipment.  Other assistive devices have been attempted.  Having the wheelchair will allow patient to perform these activities of daily living.  Patient will have family assist with equipment, and both are in agreement.    MARTA Andino PA-C  Orthopedic Surgery Team  #[phone]
Based on the patients on going issues with deconditioning and generalized weakness secondary to patients diagnosis of R IM Nail ORIF.  Patient will require a semi electric hospital bed.  This is necessary to achieve positioning, elevation and the head of bed needs to be elevated at least 30 degrees most of the time.  Bed pillows and wedges have been tried and ruled out.        JCARLOS LIFT NOTE  Patient will required Jcarlos Lift to transfer from bed to chair and vice versa.    Mario Andino PA-C  Orthopedic Surgery Team  Team Pager #[phone]

## 2023-05-10 NOTE — PROVIDER CONTACT NOTE (OTHER) - ACTION/TREATMENT ORDERED:
MD Saini made aware. No interventions needed at this time
Recheck vitals in 1 hr. no further orders needed at this time. POC continues.

## 2023-05-10 NOTE — DIETITIAN INITIAL EVALUATION ADULT - NSFNSGIIOFT_GEN_A_CORE
-- No bowel movement noted since admission, senna, polythene glycol, bisacodyl suppository and magnesium hydroxide Suspension for bowel regimen

## 2023-05-10 NOTE — PROVIDER CONTACT NOTE (OTHER) - SITUATION
Pt arrived from ED to [facility] with regular bedpan underneath w/ a R hip fx leaving a 10x3cm red randy across L lateral thigh
Pt tachy @ 264-120

## 2023-05-10 NOTE — DIETITIAN INITIAL EVALUATION ADULT - PERTINENT MEDS FT
MEDICATIONS  (STANDING):  acetaminophen     Tablet .. 975 milliGRAM(s) Oral every 8 hours  enoxaparin Injectable 40 milliGRAM(s) SubCutaneous every 24 hours  fluorometholone 0.1% Suspension 1 Drop(s) Both EYES daily  HYDROmorphone  Injectable 0.5 milliGRAM(s) IV Push once  lactated ringers. 1000 milliLiter(s) (75 mL/Hr) IV Continuous <Continuous>  latanoprost 0.005% Ophthalmic Solution 1 Drop(s) Both EYES at bedtime  loratadine 10 milliGRAM(s) Oral daily  multivitamin 1 Tablet(s) Oral daily  polyethylene glycol 3350 17 Gram(s) Oral at bedtime  rufinamide 2000 milliGRAM(s) Oral <User Schedule>  rufinamide 1200 milliGRAM(s) Oral <User Schedule>  senna 2 Tablet(s) Oral at bedtime  sodium chloride 0.9%. 1000 milliLiter(s) (125 mL/Hr) IV Continuous <Continuous>    MEDICATIONS  (PRN):  aluminum hydroxide/magnesium hydroxide/simethicone Suspension 30 milliLiter(s) Oral four times a day PRN Indigestion  bisacodyl Suppository 10 milliGRAM(s) Rectal daily PRN If no bowel movement by POD#2  magnesium hydroxide Suspension 30 milliLiter(s) Oral daily PRN Constipation  magnesium hydroxide Suspension 30 milliLiter(s) Oral daily PRN Constipation  ondansetron Injectable 4 milliGRAM(s) IV Push every 6 hours PRN Nausea and/or Vomiting  oxyCODONE    IR 5 milliGRAM(s) Oral every 4 hours PRN Severe Pain (7 - 10)  oxyCODONE    IR 2.5 milliGRAM(s) Oral every 3 hours PRN Moderate Pain (4 - 6)  oxyCODONE    IR 2.5 milliGRAM(s) Oral every 4 hours PRN Moderate Pain (4 - 6)  oxyCODONE    IR 5 milliGRAM(s) Oral every 3 hours PRN Severe Pain (7 - 10)  traMADol 25 milliGRAM(s) Oral every 6 hours PRN Mild Pain (1 - 3)

## 2023-05-10 NOTE — DIETITIAN INITIAL EVALUATION ADULT - NSFNSPHYEXAMSKINFT_GEN_A_CORE
Pressure Injury 1: sacrum, Suspected deep tissue injury  Pressure Injury 2: none, none  Pressure Injury 3: none, none  Pressure Injury 4: none, none  Pressure Injury 5: none, none  Pressure Injury 6: none, none  Pressure Injury 7: none, none  Pressure Injury 8: none, none  Pressure Injury 9: none, none  Pressure Injury 10: none, none  Pressure Injury 11: none, none,

## 2023-05-11 LAB
ANION GAP SERPL CALC-SCNC: 10 MMOL/L — SIGNIFICANT CHANGE UP (ref 5–17)
BUN SERPL-MCNC: 17 MG/DL — SIGNIFICANT CHANGE UP (ref 7–23)
CALCIUM SERPL-MCNC: 8.9 MG/DL — SIGNIFICANT CHANGE UP (ref 8.4–10.5)
CHLORIDE SERPL-SCNC: 110 MMOL/L — HIGH (ref 96–108)
CO2 SERPL-SCNC: 24 MMOL/L — SIGNIFICANT CHANGE UP (ref 22–31)
CREAT SERPL-MCNC: 0.67 MG/DL — SIGNIFICANT CHANGE UP (ref 0.5–1.3)
EGFR: 83 ML/MIN/1.73M2 — SIGNIFICANT CHANGE UP
GLUCOSE SERPL-MCNC: 102 MG/DL — HIGH (ref 70–99)
HCT VFR BLD CALC: 28.6 % — LOW (ref 34.5–45)
HGB BLD-MCNC: 9.5 G/DL — LOW (ref 11.5–15.5)
MCHC RBC-ENTMCNC: 30 PG — SIGNIFICANT CHANGE UP (ref 27–34)
MCHC RBC-ENTMCNC: 33.2 GM/DL — SIGNIFICANT CHANGE UP (ref 32–36)
MCV RBC AUTO: 90.2 FL — SIGNIFICANT CHANGE UP (ref 80–100)
NRBC # BLD: 0 /100 WBCS — SIGNIFICANT CHANGE UP (ref 0–0)
PLATELET # BLD AUTO: 197 K/UL — SIGNIFICANT CHANGE UP (ref 150–400)
POTASSIUM SERPL-MCNC: 3.9 MMOL/L — SIGNIFICANT CHANGE UP (ref 3.5–5.3)
POTASSIUM SERPL-SCNC: 3.9 MMOL/L — SIGNIFICANT CHANGE UP (ref 3.5–5.3)
RBC # BLD: 3.17 M/UL — LOW (ref 3.8–5.2)
RBC # FLD: 14.7 % — HIGH (ref 10.3–14.5)
SODIUM SERPL-SCNC: 144 MMOL/L — SIGNIFICANT CHANGE UP (ref 135–145)
WBC # BLD: 10.17 K/UL — SIGNIFICANT CHANGE UP (ref 3.8–10.5)
WBC # FLD AUTO: 10.17 K/UL — SIGNIFICANT CHANGE UP (ref 3.8–10.5)

## 2023-05-11 RX ORDER — CALAMINE AND ZINC OXIDE AND PHENOL 160; 10 MG/ML; MG/ML
1 LOTION TOPICAL
Refills: 0 | Status: DISCONTINUED | OUTPATIENT
Start: 2023-05-11 | End: 2023-05-12

## 2023-05-11 RX ADMIN — Medication 975 MILLIGRAM(S): at 16:40

## 2023-05-11 RX ADMIN — SENNA PLUS 2 TABLET(S): 8.6 TABLET ORAL at 21:38

## 2023-05-11 RX ADMIN — Medication 975 MILLIGRAM(S): at 09:04

## 2023-05-11 RX ADMIN — FLUOROMETHOLONE 1 DROP(S): 1 SOLUTION/ DROPS OPHTHALMIC at 11:13

## 2023-05-11 RX ADMIN — ENOXAPARIN SODIUM 40 MILLIGRAM(S): 100 INJECTION SUBCUTANEOUS at 05:32

## 2023-05-11 RX ADMIN — LATANOPROST 1 DROP(S): 0.05 SOLUTION/ DROPS OPHTHALMIC; TOPICAL at 21:39

## 2023-05-11 RX ADMIN — Medication 975 MILLIGRAM(S): at 17:30

## 2023-05-11 RX ADMIN — Medication 975 MILLIGRAM(S): at 09:45

## 2023-05-11 RX ADMIN — LORATADINE 10 MILLIGRAM(S): 10 TABLET ORAL at 11:14

## 2023-05-11 RX ADMIN — CALAMINE AND ZINC OXIDE AND PHENOL 1 APPLICATION(S): 160; 10 LOTION TOPICAL at 09:07

## 2023-05-11 RX ADMIN — RUFINAMIDE 1200 MILLIGRAM(S): 40 SUSPENSION ORAL at 09:05

## 2023-05-11 RX ADMIN — RUFINAMIDE 2000 MILLIGRAM(S): 40 SUSPENSION ORAL at 21:40

## 2023-05-11 RX ADMIN — Medication 1 TABLET(S): at 11:15

## 2023-05-11 RX ADMIN — POLYETHYLENE GLYCOL 3350 17 GRAM(S): 17 POWDER, FOR SOLUTION ORAL at 21:38

## 2023-05-12 ENCOUNTER — TRANSCRIPTION ENCOUNTER (OUTPATIENT)
Age: 88
End: 2023-05-12

## 2023-05-12 VITALS
TEMPERATURE: 97 F | RESPIRATION RATE: 18 BRPM | DIASTOLIC BLOOD PRESSURE: 66 MMHG | SYSTOLIC BLOOD PRESSURE: 100 MMHG | OXYGEN SATURATION: 96 % | HEART RATE: 93 BPM

## 2023-05-12 LAB
ANION GAP SERPL CALC-SCNC: 11 MMOL/L — SIGNIFICANT CHANGE UP (ref 5–17)
BUN SERPL-MCNC: 16 MG/DL — SIGNIFICANT CHANGE UP (ref 7–23)
CALCIUM SERPL-MCNC: 8.7 MG/DL — SIGNIFICANT CHANGE UP (ref 8.4–10.5)
CHLORIDE SERPL-SCNC: 108 MMOL/L — SIGNIFICANT CHANGE UP (ref 96–108)
CO2 SERPL-SCNC: 24 MMOL/L — SIGNIFICANT CHANGE UP (ref 22–31)
CREAT SERPL-MCNC: 0.76 MG/DL — SIGNIFICANT CHANGE UP (ref 0.5–1.3)
EGFR: 74 ML/MIN/1.73M2 — SIGNIFICANT CHANGE UP
GLUCOSE SERPL-MCNC: 92 MG/DL — SIGNIFICANT CHANGE UP (ref 70–99)
HCT VFR BLD CALC: 27.1 % — LOW (ref 34.5–45)
HGB BLD-MCNC: 9 G/DL — LOW (ref 11.5–15.5)
MCHC RBC-ENTMCNC: 30.2 PG — SIGNIFICANT CHANGE UP (ref 27–34)
MCHC RBC-ENTMCNC: 33.2 GM/DL — SIGNIFICANT CHANGE UP (ref 32–36)
MCV RBC AUTO: 90.9 FL — SIGNIFICANT CHANGE UP (ref 80–100)
NRBC # BLD: 0 /100 WBCS — SIGNIFICANT CHANGE UP (ref 0–0)
PLATELET # BLD AUTO: 205 K/UL — SIGNIFICANT CHANGE UP (ref 150–400)
POTASSIUM SERPL-MCNC: 4.3 MMOL/L — SIGNIFICANT CHANGE UP (ref 3.5–5.3)
POTASSIUM SERPL-SCNC: 4.3 MMOL/L — SIGNIFICANT CHANGE UP (ref 3.5–5.3)
RBC # BLD: 2.98 M/UL — LOW (ref 3.8–5.2)
RBC # FLD: 14.9 % — HIGH (ref 10.3–14.5)
SARS-COV-2 RNA SPEC QL NAA+PROBE: SIGNIFICANT CHANGE UP
SODIUM SERPL-SCNC: 143 MMOL/L — SIGNIFICANT CHANGE UP (ref 135–145)
WBC # BLD: 8.47 K/UL — SIGNIFICANT CHANGE UP (ref 3.8–10.5)
WBC # FLD AUTO: 8.47 K/UL — SIGNIFICANT CHANGE UP (ref 3.8–10.5)

## 2023-05-12 PROCEDURE — 86901 BLOOD TYPING SEROLOGIC RH(D): CPT

## 2023-05-12 PROCEDURE — 70450 CT HEAD/BRAIN W/O DYE: CPT | Mod: MG

## 2023-05-12 PROCEDURE — 36415 COLL VENOUS BLD VENIPUNCTURE: CPT

## 2023-05-12 PROCEDURE — 87635 SARS-COV-2 COVID-19 AMP PRB: CPT

## 2023-05-12 PROCEDURE — 73502 X-RAY EXAM HIP UNI 2-3 VIEWS: CPT

## 2023-05-12 PROCEDURE — C9399: CPT

## 2023-05-12 PROCEDURE — 85025 COMPLETE CBC W/AUTO DIFF WBC: CPT

## 2023-05-12 PROCEDURE — 97530 THERAPEUTIC ACTIVITIES: CPT

## 2023-05-12 PROCEDURE — 97116 GAIT TRAINING THERAPY: CPT

## 2023-05-12 PROCEDURE — C1713: CPT

## 2023-05-12 PROCEDURE — U0005: CPT

## 2023-05-12 PROCEDURE — 90471 IMMUNIZATION ADMIN: CPT

## 2023-05-12 PROCEDURE — 96374 THER/PROPH/DIAG INJ IV PUSH: CPT

## 2023-05-12 PROCEDURE — G1004: CPT

## 2023-05-12 PROCEDURE — 86850 RBC ANTIBODY SCREEN: CPT

## 2023-05-12 PROCEDURE — U0003: CPT

## 2023-05-12 PROCEDURE — 86900 BLOOD TYPING SEROLOGIC ABO: CPT

## 2023-05-12 PROCEDURE — P9016: CPT

## 2023-05-12 PROCEDURE — 85730 THROMBOPLASTIN TIME PARTIAL: CPT

## 2023-05-12 PROCEDURE — 80048 BASIC METABOLIC PNL TOTAL CA: CPT

## 2023-05-12 PROCEDURE — C1769: CPT

## 2023-05-12 PROCEDURE — 80053 COMPREHEN METABOLIC PANEL: CPT

## 2023-05-12 PROCEDURE — 72125 CT NECK SPINE W/O DYE: CPT | Mod: MG

## 2023-05-12 PROCEDURE — 85027 COMPLETE CBC AUTOMATED: CPT

## 2023-05-12 PROCEDURE — 36430 TRANSFUSION BLD/BLD COMPNT: CPT

## 2023-05-12 PROCEDURE — 76000 FLUOROSCOPY <1 HR PHYS/QHP: CPT

## 2023-05-12 PROCEDURE — 99285 EMERGENCY DEPT VISIT HI MDM: CPT

## 2023-05-12 PROCEDURE — 86923 COMPATIBILITY TEST ELECTRIC: CPT

## 2023-05-12 PROCEDURE — 85610 PROTHROMBIN TIME: CPT

## 2023-05-12 PROCEDURE — 97162 PT EVAL MOD COMPLEX 30 MIN: CPT

## 2023-05-12 PROCEDURE — 71045 X-RAY EXAM CHEST 1 VIEW: CPT

## 2023-05-12 PROCEDURE — 73552 X-RAY EXAM OF FEMUR 2/>: CPT

## 2023-05-12 PROCEDURE — 97165 OT EVAL LOW COMPLEX 30 MIN: CPT

## 2023-05-12 PROCEDURE — 90715 TDAP VACCINE 7 YRS/> IM: CPT

## 2023-05-12 RX ORDER — SENNA PLUS 8.6 MG/1
2 TABLET ORAL
Qty: 0 | Refills: 0 | DISCHARGE
Start: 2023-05-12

## 2023-05-12 RX ORDER — LATANOPROST 0.05 MG/ML
1 SOLUTION/ DROPS OPHTHALMIC; TOPICAL
Qty: 0 | Refills: 0 | DISCHARGE
Start: 2023-05-12

## 2023-05-12 RX ORDER — ENOXAPARIN SODIUM 100 MG/ML
40 INJECTION SUBCUTANEOUS
Qty: 0 | Refills: 0 | DISCHARGE
Start: 2023-05-12

## 2023-05-12 RX ORDER — TRAMADOL HYDROCHLORIDE 50 MG/1
0.5 TABLET ORAL
Qty: 0 | Refills: 0 | DISCHARGE
Start: 2023-05-12

## 2023-05-12 RX ORDER — FLUOROMETHOLONE 1 MG/ML
1 SOLUTION/ DROPS OPHTHALMIC
Qty: 0 | Refills: 0 | DISCHARGE
Start: 2023-05-12

## 2023-05-12 RX ORDER — LORATADINE 10 MG/1
1 TABLET ORAL
Qty: 0 | Refills: 0 | DISCHARGE
Start: 2023-05-12

## 2023-05-12 RX ORDER — ACETAMINOPHEN 500 MG
3 TABLET ORAL
Qty: 0 | Refills: 0 | DISCHARGE
Start: 2023-05-12

## 2023-05-12 RX ORDER — MAGNESIUM HYDROXIDE 400 MG/1
30 TABLET, CHEWABLE ORAL
Qty: 0 | Refills: 0 | DISCHARGE
Start: 2023-05-12

## 2023-05-12 RX ORDER — OXYCODONE HYDROCHLORIDE 5 MG/1
2.5 TABLET ORAL
Qty: 0 | Refills: 0 | DISCHARGE
Start: 2023-05-12

## 2023-05-12 RX ORDER — POLYETHYLENE GLYCOL 3350 17 G/17G
17 POWDER, FOR SOLUTION ORAL
Qty: 0 | Refills: 0 | DISCHARGE
Start: 2023-05-12

## 2023-05-12 RX ORDER — OXYCODONE HYDROCHLORIDE 5 MG/1
1 TABLET ORAL
Qty: 0 | Refills: 0 | DISCHARGE
Start: 2023-05-12

## 2023-05-12 RX ADMIN — RUFINAMIDE 1200 MILLIGRAM(S): 40 SUSPENSION ORAL at 09:08

## 2023-05-12 RX ADMIN — LORATADINE 10 MILLIGRAM(S): 10 TABLET ORAL at 12:42

## 2023-05-12 RX ADMIN — CALAMINE AND ZINC OXIDE AND PHENOL 1 APPLICATION(S): 160; 10 LOTION TOPICAL at 05:46

## 2023-05-12 RX ADMIN — Medication 975 MILLIGRAM(S): at 10:08

## 2023-05-12 RX ADMIN — ENOXAPARIN SODIUM 40 MILLIGRAM(S): 100 INJECTION SUBCUTANEOUS at 05:46

## 2023-05-12 RX ADMIN — Medication 1 TABLET(S): at 12:42

## 2023-05-12 RX ADMIN — FLUOROMETHOLONE 1 DROP(S): 1 SOLUTION/ DROPS OPHTHALMIC at 12:39

## 2023-05-12 RX ADMIN — Medication 975 MILLIGRAM(S): at 09:08

## 2023-05-12 NOTE — DISCHARGE NOTE NURSING/CASE MANAGEMENT/SOCIAL WORK - PATIENT PORTAL LINK FT
You can access the FollowMyHealth Patient Portal offered by  by registering at the following website: [email or web address]. By joining [facility]’s FollowMyHealth portal, you will also be able to view your health information using other applications (apps) compatible with our system.

## 2023-05-12 NOTE — PROGRESS NOTE ADULT - PROVIDER SPECIALTY LIST ADULT
Internal Medicine
Internal Medicine
Orthopedics
Internal Medicine
Orthopedics
Internal Medicine
Orthopedics

## 2023-05-12 NOTE — PROGRESS NOTE ADULT - SUBJECTIVE AND OBJECTIVE BOX
KANG VILLAFANA  89y Female  MRN:[id number]    Patient is a 89y old  Female who presents with a chief complaint of Right hip fracture (09 May 2023 05:41)    HPI:  89y Female presents s/p mech fall c/o severe right hip pain and inability to ambulate.  Patient denies headstrike or LOC. Patient denies radiation of pain. Patient denies numbness/tingling/burning in the RLE. No other bone/joint complaints. Patient is a community ambulator at baseline with/without assistive devices.  pt found to have right intratrochanteric fx    Patient seen and evaluated at bedside. No acute events overnight except as noted.    Interval HPI: s/p right hip IMN. 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      Dementia      Cataract extraction status, right  2011          REVIEW OF SYSTEMS:  as per hpi     VITALS:   Vital Signs Last 24 Hrs  T(C): 36.9 (11 May 2023 08:10), Max: 37 (10 May 2023 19:41)  T(F): 98.4 (11 May 2023 08:10), Max: 98.6 (10 May 2023 19:41)  HR: 95 (11 May 2023 08:10) (95 - 111)  BP: 106/54 (11 May 2023 08:10) (89/56 - 120/62)  BP(mean): --  RR: 18 (11 May 2023 08:10) (18 - 18)  SpO2: 91% (11 May 2023 08:10) (91% - 96%)    Parameters below as of 11 May 2023 08:10  Patient On (Oxygen Delivery Method): room air             PHYSICAL EXAM:  GENERAL: NAD, well-developed  HEAD:  Atraumatic, Normocephalic  EYES: EOMI, PERRLA, conjunctiva and sclera clear  NECK: Supple, No JVD  CHEST/LUNG: Clear to auscultation bilaterally; No wheeze  HEART: S1, S2; No murmurs, rubs, or gallops  ABDOMEN: Soft, Nontender, Nondistended; Bowel sounds present  EXTREMITIES:  2+ Peripheral Pulses, No clubbing, cyanosis, or edema  PSYCH: Normal affect  NEUROLOGY: AAOX3; non-focal  SKIN: No rashes or lesions    Consultant(s) Notes Reviewed:  [x ] YES  [ ] NO  Care Discussed with Consultants/Other Providers [ x] YES  [ ] NO    MEDS:   MEDICATIONS  (STANDING):  acetaminophen     Tablet .. 975 milliGRAM(s) Oral every 8 hours  calamine/zinc oxide Lotion 1 Application(s) Topical two times a day  enoxaparin Injectable 40 milliGRAM(s) SubCutaneous every 24 hours  fluorometholone 0.1% Suspension 1 Drop(s) Both EYES daily  HYDROmorphone  Injectable 0.5 milliGRAM(s) IV Push once  lactated ringers. 1000 milliLiter(s) (75 mL/Hr) IV Continuous <Continuous>  latanoprost 0.005% Ophthalmic Solution 1 Drop(s) Both EYES at bedtime  loratadine 10 milliGRAM(s) Oral daily  multivitamin 1 Tablet(s) Oral daily  polyethylene glycol 3350 17 Gram(s) Oral at bedtime  rufinamide 1200 milliGRAM(s) Oral <User Schedule>  rufinamide 2000 milliGRAM(s) Oral <User Schedule>  senna 2 Tablet(s) Oral at bedtime  sodium chloride 0.9%. 1000 milliLiter(s) (125 mL/Hr) IV Continuous <Continuous>    MEDICATIONS  (PRN):  aluminum hydroxide/magnesium hydroxide/simethicone Suspension 30 milliLiter(s) Oral four times a day PRN Indigestion  bisacodyl Suppository 10 milliGRAM(s) Rectal once PRN Constipation  bisacodyl Suppository 10 milliGRAM(s) Rectal daily PRN If no bowel movement by POD#2  magnesium hydroxide Suspension 30 milliLiter(s) Oral daily PRN Constipation  magnesium hydroxide Suspension 30 milliLiter(s) Oral daily PRN Constipation  ondansetron Injectable 4 milliGRAM(s) IV Push every 6 hours PRN Nausea and/or Vomiting  oxyCODONE    IR 5 milliGRAM(s) Oral every 4 hours PRN Severe Pain (7 - 10)  oxyCODONE    IR 5 milliGRAM(s) Oral every 3 hours PRN Severe Pain (7 - 10)  oxyCODONE    IR 2.5 milliGRAM(s) Oral every 4 hours PRN Moderate Pain (4 - 6)  oxyCODONE    IR 2.5 milliGRAM(s) Oral every 3 hours PRN Moderate Pain (4 - 6)  traMADol 25 milliGRAM(s) Oral every 6 hours PRN Mild Pain (1 - 3)      ALLERGIES:  Nuts (Hives)   reports severe allergy to PEAS and NUTS (Hives)  penicillin (Unknown)      LABS:                                            9.5    10.17 )-----------( 197      ( 11 May 2023 07:14 )             28.6   05-11    144  |  110<H>  |  17  ----------------------------<  102<H>  3.9   |  24  |  0.67    Ca    8.9      11 May 2023 07:15           < from: Xray Femur 2 Views, Right (05.07.23 @ 16:06) >  IMPRESSION:  Acute intertrochanteric fracture of the right femur.    --- End of Report ---      < end of copied text >  < from: CT Head No Cont (05.07.23 @ 15:59) >    IMPRESSION:  CT head:  -No acute intracranial findings.  -Chronic right caudate nucleus infarct.    CT cervical spine:  -No acute fracture or dislocation.    --- End of Report ---          < end of copied text >  
Patient c/o "itchy face." Reports had "amanda" cream applied to face by spouse yesterday.  Denies cp, sob, palpitations, N/V.    T(C): 36.4 (05-11-23 @ 05:00), Max: 37 (05-10-23 @ 19:41)  HR: 100 (05-11-23 @ 05:00) (100 - 111)  BP: 120/62 (05-11-23 @ 05:00) (89/56 - 120/62)  RR: 18 (05-11-23 @ 05:00) (18 - 18)  SpO2: 93% (05-11-23 @ 05:00) (92% - 96%)      PHYSICAL EXAM:  NAD, Alert, follows commands, pleasant  Ext:  RLE:  Dressings C/D/I; compartments soft and compressible, dull sensation grossly intact to light touch; (+) DF/PF; (+) Distal Pulses; No Calf tenderness B/L, PAS     LABS:                        10.0   11.52 )-----------( 199      ( 10 May 2023 10:04 )             29.8     05-10    141  |  107  |  16  ----------------------------<  230<H>  4.0   |  21<L>  |  0.84    Ca    8.2<L>      10 May 2023 10:04              
Patient is a 89y old  Female who presents with a chief complaint of Chart Reviewed, Events Noted  "89y Female presents s/p [facility] fall c/o severe right hip pain and inability to ambulate.  Patient denies headstrike or LOC. Patient denies radiation of pain. Patient denies numbness/tingling/burning in the RLE. No other bone/joint complaints. Patient is a community ambulator at baseline with/without assistive devices." (10 May 2023 10:10)  Patient s/p right hip surgical fixation with IM nail POD#4  Patient comfortable  No complaints    T(C): 36.7 (05-12-23 @ 05:06), Max: 36.9 (05-11-23 @ 08:10)  HR: 96 (05-12-23 @ 05:06) (88 - 97)  BP: 104/68 (05-12-23 @ 05:06) (99/57 - 106/54)  RR: 18 (05-12-23 @ 05:06) (18 - 18)  SpO2: 93% (05-12-23 @ 05:06) (91% - 96%)    PHYSICAL EXAM:  NAD, Alert  [Right ] Hip: Dressings C/D/I; sensation grossly intact to light touch; (+) DF/PF; (+) Distal Pulses; No Calf tenderness B/L, PAS     LABS:                     9.5    10.17 )-----------( 197      ( 11 May 2023 07:14 )             28.6   05-11  144  |  110<H>  |  17  ----------------------------<  102<H>  3.9   |  24  |  0.67  Ca    8.9      11 May 2023 07:15      
Postop Check    Patient tolerated the procedure well. Patient seen and examined at bedside. No acute complaints at this time. Pain well controlled. Denies chest pain, shortness of breath, nausea or vomiting.     PE:    General: NAD, resting comfortably in bed  RLE:   Dressing C/D/I  SCDs present bilaterally  Compartments soft and compressible  No calf tenderness bilaterally  +TA/EHL/FHL/GSC  SILT viviane/saph/sp/dp/tib  2+ DP/PT      A/P:  89y F s/p R IMN POD 0  -PT/OT   -WBAT RLE  -Pain Control  -DVT ppx lovenox  -Continue perioperative abx x 24 hours  -FU AM Labs  -Rest, ice, compress the extremity as needed  -Incentive Spirometry  -Medical comanagement appreciated  -dispo pending PT eval
Pt seen/examined. Pain controlled. tachycardic overnight. Denies chest pain or SOB.    T(C): 36.8 (05-10-23 @ 05:15), Max: 36.9 (05-10-23 @ 00:25)  HR: 108 (05-10-23 @ 05:15) (93 - 118)  BP: 127/60 (05-10-23 @ 05:15) (97/64 - 127/60)  RR: 18 (05-10-23 @ 05:15) (16 - 18)  SpO2: 91% (05-10-23 @ 05:15) (91% - 100%)  Wt(kg): --    Gen: awake, alert, NAD  Resp: no increased work of breathing  RLE:  dressing c/d/i  +EHL/FHL/TA/GS  SILT S/S/SP/DP  +DP Pulses  Compartments soft  No calf TTP                         7.7    12.78 )-----------( 161      ( 09 May 2023 07:52 )             24.3                         9.0    13.72 )-----------( 203      ( 09 May 2023 01:25 )             28.1       05-09    143  |  109<H>  |  12  ----------------------------<  180<H>  4.3   |  22  |  0.62      A/P: 89yFemale s/p right hip IMN, POD#2    - Pain control  - mechanical/DVT ppx  - OOB/PT  - WBAT   - FU  labs  - DIspo planning
KANG VILLAFANA  89y Female  MRN:[id number]    Patient is a 89y old  Female who presents with a chief complaint of Right hip fracture (09 May 2023 05:41)    HPI:  89y Female presents s/p mech fall c/o severe right hip pain and inability to ambulate.  Patient denies headstrike or LOC. Patient denies radiation of pain. Patient denies numbness/tingling/burning in the RLE. No other bone/joint complaints. Patient is a community ambulator at baseline with/without assistive devices.  pt found to have right intratrochanteric fx    Patient seen and evaluated at bedside. No acute events overnight except as noted.    Interval HPI: s/p right hip IMN. 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      Dementia      Cataract extraction status, right  2011          REVIEW OF SYSTEMS:  as per hpi     VITALS:  Vital Signs Last 24 Hrs  T(C): 36.3 (09 May 2023 04:30), Max: 36.8 (08 May 2023 19:30)  T(F): 97.4 (09 May 2023 04:30), Max: 98.2 (08 May 2023 19:30)  HR: 88 (09 May 2023 04:30) (71 - 98)  BP: 120/70 (09 May 2023 04:30) (114/69 - 170/77)  BP(mean): 85 (09 May 2023 02:00) (77 - 107)  RR: 18 (09 May 2023 04:30) (15 - 18)  SpO2: 99% (09 May 2023 04:30) (91% - 100%)    Parameters below as of 09 May 2023 04:30  Patient On (Oxygen Delivery Method): room air      CAPILLARY BLOOD GLUCOSE        I&O's Summary    08 May 2023 07:01  -  09 May 2023 07:00  --------------------------------------------------------  IN: 2815 mL / OUT: 450 mL / NET: 2365 mL    09 May 2023 07:01  -  09 May 2023 13:12  --------------------------------------------------------  IN: 200 mL / OUT: 300 mL / NET: -100 mL        PHYSICAL EXAM:  GENERAL: NAD, well-developed  HEAD:  Atraumatic, Normocephalic  EYES: EOMI, PERRLA, conjunctiva and sclera clear  NECK: Supple, No JVD  CHEST/LUNG: Clear to auscultation bilaterally; No wheeze  HEART: S1, S2; No murmurs, rubs, or gallops  ABDOMEN: Soft, Nontender, Nondistended; Bowel sounds present  EXTREMITIES:  2+ Peripheral Pulses, No clubbing, cyanosis, or edema  PSYCH: Normal affect  NEUROLOGY: AAOX3; non-focal  SKIN: No rashes or lesions    Consultant(s) Notes Reviewed:  [x ] YES  [ ] NO  Care Discussed with Consultants/Other Providers [ x] YES  [ ] NO    MEDS:  MEDICATIONS  (STANDING):  acetaminophen     Tablet .. 975 milliGRAM(s) Oral every 8 hours  ceFAZolin   IVPB 2000 milliGRAM(s) IV Intermittent every 8 hours  enoxaparin Injectable 40 milliGRAM(s) SubCutaneous every 24 hours  HYDROmorphone  Injectable 0.5 milliGRAM(s) IV Push once  latanoprost 0.005% Ophthalmic Solution 1 Drop(s) Both EYES at bedtime  loratadine 10 milliGRAM(s) Oral daily  multivitamin 1 Tablet(s) Oral daily  polyethylene glycol 3350 17 Gram(s) Oral at bedtime  rufinamide 2000 milliGRAM(s) Oral <User Schedule>  rufinamide 1200 milliGRAM(s) Oral <User Schedule>  senna 2 Tablet(s) Oral at bedtime  sodium chloride 0.9%. 1000 milliLiter(s) (125 mL/Hr) IV Continuous <Continuous>    MEDICATIONS  (PRN):  aluminum hydroxide/magnesium hydroxide/simethicone Suspension 30 milliLiter(s) Oral four times a day PRN Indigestion  bisacodyl Suppository 10 milliGRAM(s) Rectal daily PRN If no bowel movement by POD#2  magnesium hydroxide Suspension 30 milliLiter(s) Oral daily PRN Constipation  magnesium hydroxide Suspension 30 milliLiter(s) Oral daily PRN Constipation  ondansetron Injectable 4 milliGRAM(s) IV Push every 6 hours PRN Nausea and/or Vomiting  oxyCODONE    IR 2.5 milliGRAM(s) Oral every 3 hours PRN Moderate Pain (4 - 6)  oxyCODONE    IR 5 milliGRAM(s) Oral every 4 hours PRN Severe Pain (7 - 10)  oxyCODONE    IR 5 milliGRAM(s) Oral every 3 hours PRN Severe Pain (7 - 10)  oxyCODONE    IR 2.5 milliGRAM(s) Oral every 4 hours PRN Moderate Pain (4 - 6)  traMADol 25 milliGRAM(s) Oral every 6 hours PRN Mild Pain (1 - 3)    ALLERGIES:  Nuts (Hives)   reports severe allergy to PEAS and NUTS (Hives)  penicillin (Unknown)      LABS:                        7.7    12.78 )-----------( 161      ( 09 May 2023 07:52 )             24.3     05-09    143  |  109<H>  |  12  ----------------------------<  180<H>  4.3   |  22  |  0.62    Ca    8.0<L>      09 May 2023 09:47    TPro  7.2  /  Alb  3.7  /  TBili  0.2  /  DBili  x   /  AST  27  /  ALT  15  /  AlkPhos  86  05-07    PT/INR - ( 08 May 2023 04:30 )   PT: 13.9 sec;   INR: 1.20 ratio         PTT - ( 08 May 2023 04:30 )  PTT:26.6 sec      LIVER FUNCTIONS - ( 07 May 2023 15:04 )  Alb: 3.7 g/dL / Pro: 7.2 g/dL / ALK PHOS: 86 U/L / ALT: 15 U/L / AST: 27 U/L / GGT: x              < from: Xray Femur 2 Views, Right (05.07.23 @ 16:06) >  IMPRESSION:  Acute intertrochanteric fracture of the right femur.    --- End of Report ---      < end of copied text >  < from: CT Head No Cont (05.07.23 @ 15:59) >    IMPRESSION:  CT head:  -No acute intracranial findings.  -Chronic right caudate nucleus infarct.    CT cervical spine:  -No acute fracture or dislocation.    --- End of Report ---          < end of copied text >  
KANG VILLAFANA  89y Female  MRN:[id number]    Patient is a 89y old  Female who presents with a chief complaint of Right hip fracture (09 May 2023 05:41)    HPI:  89y Female presents s/p mech fall c/o severe right hip pain and inability to ambulate.  Patient denies headstrike or LOC. Patient denies radiation of pain. Patient denies numbness/tingling/burning in the RLE. No other bone/joint complaints. Patient is a community ambulator at baseline with/without assistive devices.  pt found to have right intratrochanteric fx    Patient seen and evaluated at bedside. No acute events overnight except as noted.    Interval HPI: s/p right hip IMN. 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      Dementia      Cataract extraction status, right  2011          REVIEW OF SYSTEMS:  as per hpi     VITALS:   Vital Signs Last 24 Hrs  T(C): 36.8 (10 May 2023 12:09), Max: 36.9 (10 May 2023 00:25)  T(F): 98.3 (10 May 2023 12:09), Max: 98.4 (10 May 2023 00:25)  HR: 104 (10 May 2023 12:09) (93 - 118)  BP: 100/58 (10 May 2023 12:09) (100/58 - 127/60)  BP(mean): --  RR: 18 (10 May 2023 12:09) (17 - 18)  SpO2: 92% (10 May 2023 12:09) (91% - 100%)    Parameters below as of 10 May 2023 12:09  Patient On (Oxygen Delivery Method): room air             PHYSICAL EXAM:  GENERAL: NAD, well-developed  HEAD:  Atraumatic, Normocephalic  EYES: EOMI, PERRLA, conjunctiva and sclera clear  NECK: Supple, No JVD  CHEST/LUNG: Clear to auscultation bilaterally; No wheeze  HEART: S1, S2; No murmurs, rubs, or gallops  ABDOMEN: Soft, Nontender, Nondistended; Bowel sounds present  EXTREMITIES:  2+ Peripheral Pulses, No clubbing, cyanosis, or edema  PSYCH: Normal affect  NEUROLOGY: AAOX3; non-focal  SKIN: No rashes or lesions    Consultant(s) Notes Reviewed:  [x ] YES  [ ] NO  Care Discussed with Consultants/Other Providers [ x] YES  [ ] NO    MEDS:   MEDICATIONS  (STANDING):  acetaminophen     Tablet .. 975 milliGRAM(s) Oral every 8 hours  enoxaparin Injectable 40 milliGRAM(s) SubCutaneous every 24 hours  fluorometholone 0.1% Suspension 1 Drop(s) Both EYES daily  HYDROmorphone  Injectable 0.5 milliGRAM(s) IV Push once  lactated ringers. 1000 milliLiter(s) (75 mL/Hr) IV Continuous <Continuous>  latanoprost 0.005% Ophthalmic Solution 1 Drop(s) Both EYES at bedtime  loratadine 10 milliGRAM(s) Oral daily  multivitamin 1 Tablet(s) Oral daily  polyethylene glycol 3350 17 Gram(s) Oral at bedtime  rufinamide 2000 milliGRAM(s) Oral <User Schedule>  rufinamide 1200 milliGRAM(s) Oral <User Schedule>  senna 2 Tablet(s) Oral at bedtime  sodium chloride 0.9%. 1000 milliLiter(s) (125 mL/Hr) IV Continuous <Continuous>    MEDICATIONS  (PRN):  aluminum hydroxide/magnesium hydroxide/simethicone Suspension 30 milliLiter(s) Oral four times a day PRN Indigestion  bisacodyl Suppository 10 milliGRAM(s) Rectal daily PRN If no bowel movement by POD#2  magnesium hydroxide Suspension 30 milliLiter(s) Oral daily PRN Constipation  magnesium hydroxide Suspension 30 milliLiter(s) Oral daily PRN Constipation  ondansetron Injectable 4 milliGRAM(s) IV Push every 6 hours PRN Nausea and/or Vomiting  oxyCODONE    IR 5 milliGRAM(s) Oral every 4 hours PRN Severe Pain (7 - 10)  oxyCODONE    IR 5 milliGRAM(s) Oral every 3 hours PRN Severe Pain (7 - 10)  oxyCODONE    IR 2.5 milliGRAM(s) Oral every 4 hours PRN Moderate Pain (4 - 6)  oxyCODONE    IR 2.5 milliGRAM(s) Oral every 3 hours PRN Moderate Pain (4 - 6)  traMADol 25 milliGRAM(s) Oral every 6 hours PRN Mild Pain (1 - 3)      ALLERGIES:  Nuts (Hives)   reports severe allergy to PEAS and NUTS (Hives)  penicillin (Unknown)      LABS:                                      10.0   11.52 )-----------( 199      ( 10 May 2023 10:04 )             29.8   05-10    141  |  107  |  16  ----------------------------<  230<H>  4.0   |  21<L>  |  0.84    Ca    8.2<L>      10 May 2023 10:04         < from: Xray Femur 2 Views, Right (05.07.23 @ 16:06) >  IMPRESSION:  Acute intertrochanteric fracture of the right femur.    --- End of Report ---      < end of copied text >  < from: CT Head No Cont (05.07.23 @ 15:59) >    IMPRESSION:  CT head:  -No acute intracranial findings.  -Chronic right caudate nucleus infarct.    CT cervical spine:  -No acute fracture or dislocation.    --- End of Report ---          < end of copied text >  
KANG VILLAFANA  89y Female  MRN:[id number]    Patient is a 89y old  Female who presents with a chief complaint of Right hip fracture (09 May 2023 05:41)    HPI:  89y Female presents s/p mech fall c/o severe right hip pain and inability to ambulate.  Patient denies headstrike or LOC. Patient denies radiation of pain. Patient denies numbness/tingling/burning in the RLE. No other bone/joint complaints. Patient is a community ambulator at baseline with/without assistive devices.  pt found to have right intratrochanteric fx    Patient seen and evaluated at bedside. No acute events overnight except as noted.    Interval HPI: s/p right hip IMN. 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      Dementia      Cataract extraction status, right  2011          REVIEW OF SYSTEMS:  as per hpi     VITALS:   Vital Signs Last 24 Hrs  T(C): 36.5 (12 May 2023 08:15), Max: 36.7 (12 May 2023 05:06)  T(F): 97.7 (12 May 2023 08:15), Max: 98.1 (12 May 2023 05:06)  HR: 96 (12 May 2023 08:15) (88 - 97)  BP: 111/69 (12 May 2023 08:15) (99/57 - 111/69)  BP(mean): --  RR: 18 (12 May 2023 08:15) (18 - 18)  SpO2: 95% (12 May 2023 08:15) (93% - 96%)    Parameters below as of 12 May 2023 08:15  Patient On (Oxygen Delivery Method): room air          PHYSICAL EXAM:  GENERAL: NAD, well-developed  HEAD:  Atraumatic, Normocephalic  EYES: EOMI, PERRLA, conjunctiva and sclera clear  NECK: Supple, No JVD  CHEST/LUNG: Clear to auscultation bilaterally; No wheeze  HEART: S1, S2; No murmurs, rubs, or gallops  ABDOMEN: Soft, Nontender, Nondistended; Bowel sounds present  EXTREMITIES:  2+ Peripheral Pulses, No clubbing, cyanosis, or edema  PSYCH: Normal affect  NEUROLOGY: AAOX3; non-focal  SKIN: No rashes or lesions    Consultant(s) Notes Reviewed:  [x ] YES  [ ] NO  Care Discussed with Consultants/Other Providers [ x] YES  [ ] NO    MEDS:   MEDICATIONS  (STANDING):  acetaminophen     Tablet .. 975 milliGRAM(s) Oral every 8 hours  calamine/zinc oxide Lotion 1 Application(s) Topical two times a day  enoxaparin Injectable 40 milliGRAM(s) SubCutaneous every 24 hours  fluorometholone 0.1% Suspension 1 Drop(s) Both EYES daily  HYDROmorphone  Injectable 0.5 milliGRAM(s) IV Push once  lactated ringers. 1000 milliLiter(s) (75 mL/Hr) IV Continuous <Continuous>  latanoprost 0.005% Ophthalmic Solution 1 Drop(s) Both EYES at bedtime  loratadine 10 milliGRAM(s) Oral daily  multivitamin 1 Tablet(s) Oral daily  polyethylene glycol 3350 17 Gram(s) Oral at bedtime  rufinamide 2000 milliGRAM(s) Oral <User Schedule>  rufinamide 1200 milliGRAM(s) Oral <User Schedule>  senna 2 Tablet(s) Oral at bedtime  sodium chloride 0.9%. 1000 milliLiter(s) (125 mL/Hr) IV Continuous <Continuous>    MEDICATIONS  (PRN):  aluminum hydroxide/magnesium hydroxide/simethicone Suspension 30 milliLiter(s) Oral four times a day PRN Indigestion  bisacodyl Suppository 10 milliGRAM(s) Rectal once PRN Constipation  bisacodyl Suppository 10 milliGRAM(s) Rectal daily PRN If no bowel movement by POD#2  magnesium hydroxide Suspension 30 milliLiter(s) Oral daily PRN Constipation  magnesium hydroxide Suspension 30 milliLiter(s) Oral daily PRN Constipation  ondansetron Injectable 4 milliGRAM(s) IV Push every 6 hours PRN Nausea and/or Vomiting  oxyCODONE    IR 5 milliGRAM(s) Oral every 4 hours PRN Severe Pain (7 - 10)  oxyCODONE    IR 5 milliGRAM(s) Oral every 3 hours PRN Severe Pain (7 - 10)  oxyCODONE    IR 2.5 milliGRAM(s) Oral every 4 hours PRN Moderate Pain (4 - 6)  oxyCODONE    IR 2.5 milliGRAM(s) Oral every 3 hours PRN Moderate Pain (4 - 6)  traMADol 25 milliGRAM(s) Oral every 6 hours PRN Mild Pain (1 - 3)      ALLERGIES:  Nuts (Hives)   reports severe allergy to PEAS and NUTS (Hives)  penicillin (Unknown)      LABS:                                            9.0    8.47  )-----------( 205      ( 12 May 2023 06:59 )             27.1   05-12    143  |  108  |  16  ----------------------------<  92  4.3   |  24  |  0.76    Ca    8.7      12 May 2023 06:58           < from: Xray Femur 2 Views, Right (05.07.23 @ 16:06) >  IMPRESSION:  Acute intertrochanteric fracture of the right femur.    --- End of Report ---      < end of copied text >  < from: CT Head No Cont (05.07.23 @ 15:59) >    IMPRESSION:  CT head:  -No acute intracranial findings.  -Chronic right caudate nucleus infarct.    CT cervical spine:  -No acute fracture or dislocation.    --- End of Report ---          < end of copied text >  
 ORTHO  Patient is a 89y old  Female who presents with a chief complaint of   Pt. resting without complaint    VS-  T(C): 36.4 (05-08-23 @ 05:22), Max: 37.1 (05-07-23 @ 15:00)  HR: 105 (05-08-23 @ 05:22) (77 - 108)  BP: 123/75 (05-08-23 @ 05:22) (100/69 - 124/77)  RR: 18 (05-08-23 @ 05:22) (16 - 18)  SpO2: 94% (05-08-23 @ 05:22) (94% - 100%)  Wt(kg): --    M.S. Alert  Extremity- Right hip min tenderness  Neuro-              Motor- (+)Ankle- DF/PF              Sensation- grossly intact to light touc              Calves- soft, nontender- PAS                               11.8   12.71 )-----------( 277      ( 08 May 2023 04:30 )             36.6     05-08    141  |  104  |  20  ----------------------------<  127<H>  4.4   |  24  |  0.77    Ca    9.4      08 May 2023 04:30    TPro  7.2  /  Alb  3.7  /  TBili  0.2  /  DBili  x   /  AST  27  /  ALT  15  /  AlkPhos  86  05-07

## 2023-05-12 NOTE — PROGRESS NOTE ADULT - ASSESSMENT
89 female h/o dementia, sz, here with right hip fx    right hip fx  s/p IMN  POD 4  post op care as per ortho  pain control  PT    post op anemia  s/p prbc transfusion   monitor for bleeding    seizure disorder  cont home banzel    cont other home meds      dvt ppx    dc planning     Advanced care planning was discussed with patient and family.  Advanced care planning forms were reviewed and discussed as appropriate.  Differential diagnosis and plan of care discussed with patient after the evaluation.   Pain assessed and judicious use of narcotics when appropriate was discussed.  Importance of Fall prevention discussed.  Counseling on Smoking and Alcohol cessation was offered when appropriate.  Counseling on Diet, exercise, and medication compliance was done.       Approx 60 minutes spent.
 Impression: Stable       Plan:    Continue present treatment                  Preop, NPO, surgery today                  Continue to monitor    Layton Patino PA-C  Orthopaedic Surgery  Team pager [phone]  [phone]                     
89 female h/o dementia, sz, here with right hip fx    right hip fx  s/p IMN  POD 1  post op care as per ortho  pain control  PT    post op anemia  currently receiving prbc  monitor for bleeding    seizure disorder  cont home banzel    cont other home meds      dvt ppx    Advanced care planning was discussed with patient and family.  Advanced care planning forms were reviewed and discussed as appropriate.  Differential diagnosis and plan of care discussed with patient after the evaluation.   Pain assessed and judicious use of narcotics when appropriate was discussed.  Importance of Fall prevention discussed.  Counseling on Smoking and Alcohol cessation was offered when appropriate.  Counseling on Diet, exercise, and medication compliance was done.       Approx 60 minutes spent.
89 female h/o dementia, sz, here with right hip fx    right hip fx  s/p IMN  POD 2  post op care as per ortho  pain control  PT    post op anemia  s/p prbc transfusion   monitor for bleeding    seizure disorder  cont home banzel    cont other home meds      dvt ppx    dc planning to rehab    Advanced care planning was discussed with patient and family.  Advanced care planning forms were reviewed and discussed as appropriate.  Differential diagnosis and plan of care discussed with patient after the evaluation.   Pain assessed and judicious use of narcotics when appropriate was discussed.  Importance of Fall prevention discussed.  Counseling on Smoking and Alcohol cessation was offered when appropriate.  Counseling on Diet, exercise, and medication compliance was done.       Approx 60 minutes spent.
89 female h/o dementia, sz, here with right hip fx    right hip fx  s/p IMN  POD 3  post op care as per ortho  pain control  PT    post op anemia  s/p prbc transfusion   monitor for bleeding    seizure disorder  cont home banzel    cont other home meds      dvt ppx    dc planning     Advanced care planning was discussed with patient and family.  Advanced care planning forms were reviewed and discussed as appropriate.  Differential diagnosis and plan of care discussed with patient after the evaluation.   Pain assessed and judicious use of narcotics when appropriate was discussed.  Importance of Fall prevention discussed.  Counseling on Smoking and Alcohol cessation was offered when appropriate.  Counseling on Diet, exercise, and medication compliance was done.       Approx 60 minutes spent.
89 y/o FM s/p right hip fracture fixation with IM nail POD#4, ortho stable for d/c, waiting for DMEs?  Pallavi Alvarez PA-C  Orthopaedic Surgery  Team pager [phone]  [facility] [phone]  [phone]  
A/P: 89 y/o F POD#3 s/p R IM Nail    DVT ppx- Lovenox 40mg SQ Daily  RLE WBAT  PT  OT  Pain management prn  FU AM labs  Discharge planning to Home as per family request-Pending DME  D/W attending      ROXANE Torres  Orthopedic Surgery  [phone]

## 2023-05-12 NOTE — PROGRESS NOTE ADULT - NUTRITIONAL ASSESSMENT
This patient has been assessed with a concern for Malnutrition and has been determined to have a diagnosis/diagnoses of Severe protein-calorie malnutrition.    This patient is being managed with:   Diet Regular-  Kosher  Entered: May  9 2023  8:51AM    Diet NPO-  NPO for Procedure/Test     NPO Start Date: 08-May-2023   NPO Start Time: 00:00  Except Medications  Entered: May  7 2023 11:27PM  
This patient has been assessed with a concern for Malnutrition and has been determined to have a diagnosis/diagnoses of Severe protein-calorie malnutrition.    This patient is being managed with:   Diet Regular-  Kosher  Entered: May  9 2023  8:51AM    Diet NPO-  NPO for Procedure/Test     NPO Start Date: 08-May-2023   NPO Start Time: 00:00  Except Medications  Entered: May  7 2023 11:27PM

## 2023-05-12 NOTE — DISCHARGE NOTE NURSING/CASE MANAGEMENT/SOCIAL WORK - NSDCPEFALRISK_GEN_ALL_CORE
For information on Fall & Injury Prevention, visit: [email or web address] OR  [email or web address] OR  https://www.cdc.gov/steadi/patient.html

## 2023-05-12 NOTE — DISCHARGE NOTE NURSING/CASE MANAGEMENT/SOCIAL WORK - NSDCVIVACCINE_GEN_ALL_CORE_FT
Tdap; 29-Feb-2016 15:15; Luis Felipe Avalos (RN); [employer]; [id number]; IntraMuscular; Deltoid Left.; 0.5 milliLiter(s); VIS (VIS Published: 09-May-2013, VIS Presented: 29-Feb-2016);   Tdap; 07-May-2023 16:21; Tamara Madsen); [employer]; [id number] (Exp. Date: 07-Feb-2025); IntraMuscular; Deltoid Left.; 0.5 milliLiter(s); VIS (VIS Published: 09-May-2013, VIS Presented: 07-May-2023);

## 2023-06-19 ENCOUNTER — APPOINTMENT (OUTPATIENT)
Dept: DERMATOLOGY | Facility: CLINIC | Age: 88
End: 2023-06-19

## 2023-07-03 ENCOUNTER — APPOINTMENT (OUTPATIENT)
Dept: DERMATOLOGY | Facility: CLINIC | Age: 88
End: 2023-07-03
Payer: MEDICARE

## 2023-07-03 DIAGNOSIS — L20.9 ATOPIC DERMATITIS, UNSPECIFIED: ICD-10-CM

## 2023-07-03 PROCEDURE — 99214 OFFICE O/P EST MOD 30 MIN: CPT | Mod: 95

## 2023-07-03 RX ORDER — TACROLIMUS 1 MG/G
0.1 OINTMENT TOPICAL
Qty: 1 | Refills: 3 | Status: ACTIVE | COMMUNITY
Start: 2018-05-01 | End: 1900-01-01

## 2023-08-08 ENCOUNTER — APPOINTMENT (OUTPATIENT)
Dept: ORTHOPEDIC SURGERY | Facility: CLINIC | Age: 88
End: 2023-08-08
Payer: MEDICARE

## 2023-08-08 VITALS
DIASTOLIC BLOOD PRESSURE: 73 MMHG | HEIGHT: 60 IN | BODY MASS INDEX: 18.26 KG/M2 | OXYGEN SATURATION: 98 % | HEART RATE: 114 BPM | WEIGHT: 93 LBS | SYSTOLIC BLOOD PRESSURE: 112 MMHG

## 2023-08-08 DIAGNOSIS — M25.512 PAIN IN LEFT SHOULDER: ICD-10-CM

## 2023-08-08 PROCEDURE — 99213 OFFICE O/P EST LOW 20 MIN: CPT

## 2023-08-08 NOTE — PHYSICAL EXAM
[UE] : Sensory: Intact in bilateral upper extremities [Rad] : radial 2+ and symmetric bilaterally [Normal] : Alert and in no acute distress [Poor Appearance] : well-appearing [Acute Distress] : not in acute distress [de-identified] : The patient has no respiratory distress. Mood and affect are normal. The patient is alert and oriented to person, place and time.\par  Examination of the left shoulder demonstrates that it is clinically located.  There is mild  lateral tenderness.  Shoulder range of motion is active flexion of 30 and passive flexion of 60 degrees, abduction 30 degrees and external rotation of 20 degrees.  Left elbow motion 0 to 120 degrees.  There is no pain with wrist motion.  Tendon function is intact.  The skin is intact.  There is no lymphedema.

## 2023-08-08 NOTE — HISTORY OF PRESENT ILLNESS
[de-identified] : The patient presents for reevaluation of left shoulder pain. She had been participating in PT s/p left shoulder dislocation, but has not gone to therapy recently as she has been in rehab for a femur fracture. She still has pain and limitation with forward flexion. She would like to resume PT.

## 2023-08-08 NOTE — DISCUSSION/SUMMARY
[de-identified] : The patient has a stiff shoulder following dislocation last year.  She has glenohumeral osteoarthritis.  I have discussed the pathology, natural history and treatment options with the patient and her .  She is referred for physical therapy to try to enhance her left shoulder motion.  She can be reevaluated in 2 months.

## 2023-09-25 NOTE — DISCHARGE NOTE NURSING/CASE MANAGEMENT/SOCIAL WORK - NSDPACMPNY_GEN_ALL_CORE
Bedside report received from day RN. Assumed care of patient. Daily plan of care discussed. Pt resting comfortably in bed with no apparent signs of distress noted. 12 hour chart check complete. Hourly rounding in place.     Spouse

## 2023-10-11 NOTE — PRE-OP CHECKLIST - WEIGHT IN KG
BYPASS END OF MAY. CAN SHE TAKE TYLENOL?      PATIENT CONSIDERING INJECTIONS IN HER SHOULDERS. ASKING IF DR. TESFAYE WOULD GIVE HER THE OK FOR THAT?  
Called informed pt   
Yes Tylenol is okay per Dr Alcaraz     Yes injecitons are fine , don't hold Aspirin   
49.9

## 2023-10-23 ENCOUNTER — APPOINTMENT (OUTPATIENT)
Dept: DERMATOLOGY | Facility: CLINIC | Age: 88
End: 2023-10-23

## 2023-10-25 ENCOUNTER — APPOINTMENT (OUTPATIENT)
Dept: CARDIOLOGY | Facility: CLINIC | Age: 88
End: 2023-10-25
Payer: MEDICARE

## 2023-10-25 VITALS
BODY MASS INDEX: 19.83 KG/M2 | SYSTOLIC BLOOD PRESSURE: 116 MMHG | WEIGHT: 101 LBS | OXYGEN SATURATION: 97 % | HEART RATE: 99 BPM | HEIGHT: 60 IN | DIASTOLIC BLOOD PRESSURE: 67 MMHG

## 2023-10-25 DIAGNOSIS — E55.9 VITAMIN D DEFICIENCY, UNSPECIFIED: ICD-10-CM

## 2023-10-25 DIAGNOSIS — R26.81 UNSTEADINESS ON FEET: ICD-10-CM

## 2023-10-25 DIAGNOSIS — E78.5 HYPERLIPIDEMIA, UNSPECIFIED: ICD-10-CM

## 2023-10-25 DIAGNOSIS — F03.918 UNSPECIFIED DEMENTIA, UNSPECIFIED SEVERITY, WITH OTHER BEHAVIORAL DISTURBANCE: ICD-10-CM

## 2023-10-25 PROCEDURE — 93000 ELECTROCARDIOGRAM COMPLETE: CPT

## 2023-10-25 PROCEDURE — 99214 OFFICE O/P EST MOD 30 MIN: CPT

## 2023-10-26 LAB
25(OH)D3 SERPL-MCNC: 44.6 NG/ML
ALBUMIN SERPL ELPH-MCNC: 3.9 G/DL
ALP BLD-CCNC: 117 U/L
ALT SERPL-CCNC: 7 U/L
ANION GAP SERPL CALC-SCNC: 13 MMOL/L
AST SERPL-CCNC: 12 U/L
BILIRUB SERPL-MCNC: <0.2 MG/DL
BUN SERPL-MCNC: 25 MG/DL
CALCIUM SERPL-MCNC: 9 MG/DL
CHLORIDE SERPL-SCNC: 104 MMOL/L
CHOLEST SERPL-MCNC: 195 MG/DL
CO2 SERPL-SCNC: 24 MMOL/L
CREAT SERPL-MCNC: 0.83 MG/DL
EGFR: 67 ML/MIN/1.73M2
ESTIMATED AVERAGE GLUCOSE: 114 MG/DL
GLUCOSE SERPL-MCNC: 114 MG/DL
HBA1C MFR BLD HPLC: 5.6 %
HDLC SERPL-MCNC: 41 MG/DL
LDLC SERPL CALC-MCNC: 124 MG/DL
NONHDLC SERPL-MCNC: 154 MG/DL
POTASSIUM SERPL-SCNC: 4.1 MMOL/L
PROT SERPL-MCNC: 7.1 G/DL
SODIUM SERPL-SCNC: 141 MMOL/L
T4 SERPL-MCNC: 7.1 UG/DL
TRIGL SERPL-MCNC: 172 MG/DL
TSH SERPL-ACNC: 4.56 UIU/ML

## 2023-12-08 ENCOUNTER — APPOINTMENT (OUTPATIENT)
Dept: OPHTHALMOLOGY | Facility: CLINIC | Age: 88
End: 2023-12-08

## 2023-12-18 RX ORDER — DUPILUMAB 300 MG/2ML
300 INJECTION, SOLUTION SUBCUTANEOUS
Qty: 1 | Refills: 6 | Status: ACTIVE | COMMUNITY
Start: 2017-10-25 | End: 1900-01-01

## 2024-01-08 ENCOUNTER — NON-APPOINTMENT (OUTPATIENT)
Age: 89
End: 2024-01-08

## 2024-01-27 NOTE — PATIENT PROFILE ADULT - PATIENT REPRESENTATIVE: ( YOU CAN CHOOSE ANY PERSON THAT CAN ASSIST YOU WITH YOUR HEALTH CARE PREFERENCES, DOES NOT HAVE TO BE A SPOUSE, IMMEDIATE FAMILY OR SIGNIFICANT OTHER/PARTNER)
14-Jan-2024 19:59 16-Jan-2024 09:30 16-Jan-2024 23:28 18-Jan-2024 04:50 18-Jan-2024 22:05 17-Jan-2024 17:45 17-Jan-2024 23:30 27-Jan-2024 20:40 15-Dewayne-2024 00:20 24-Jan-2024 03:28 26-Jan-2024 06:16 27-Jan-2024 13:08 15-Dewayne-2024 08:55 declines

## 2024-06-17 NOTE — DIETITIAN INITIAL EVALUATION ADULT. - PROBLEM SELECTOR PLAN 1
Please continue with Tylenol and ibuprofen as needed for pain.  You will benefit from cooling the burns frequently with ice packs or cool towels.  Do not apply any of the topical lidocaine/aloe solution.  You should have improvement over the next several days.  Make sure the skin stays well-hydrated with hypoallergenic lotions.  Wash your skin gently with soap daily.  If you have blisters that begin do not pop them.  Please return to the emergency department if you develop fevers, chills, nausea, vomiting, signs or symptoms of infection of your skin such as purulent drainage, redness and swelling or streaking.   - fever, tachycardia, leukocytosis, UA+  - VS much improved and nontoxic on exam since ED initiated therapy.  - continue with ceftriaxone. No evidence of allergic reaction. Family reports distant penicillin allergy without known reaction with rash or anaphylaxis.  - blood and urine culture collected  - sufficient IV fluid given by ED 1L, appers euvolemic and VS have corrected. given age favor conservative fluid bolus as needed based upon VS.  - lactate in am, cbc in am, bmp in am

## 2024-07-30 ENCOUNTER — RX RENEWAL (OUTPATIENT)
Age: 89
End: 2024-07-30

## 2024-08-06 NOTE — DIETITIAN INITIAL EVALUATION ADULT. - NUTRITIONGOAL OUTCOME1
Per 85/24 visit with MTM:  do not believe occasional abdominal pain in side is secondary to Wegovy use.   Monitor for any worsening abdominal pain - follow up with Gabi Sotelo PA-C to explore this.    Keep food diary to help monitor for correlation between foods perhaps and pain.    Left voicemail for pt to return call to clinic at [phone] to be triaged for abd pain.     Traci Prater RN on 8/6/2024 at 11:41 AM    Patient to meet >75% estimated nutritional needs during hospital stay

## 2024-10-21 ENCOUNTER — RX RENEWAL (OUTPATIENT)
Age: 89
End: 2024-10-21

## 2024-11-02 ENCOUNTER — APPOINTMENT (OUTPATIENT)
Dept: DERMATOLOGY | Facility: CLINIC | Age: 89
End: 2024-11-02
Payer: MEDICARE

## 2024-11-02 DIAGNOSIS — L20.9 ATOPIC DERMATITIS, UNSPECIFIED: ICD-10-CM

## 2024-11-02 PROCEDURE — 99214 OFFICE O/P EST MOD 30 MIN: CPT

## 2024-11-26 NOTE — PROGRESS NOTE ADULT - PROBLEM/PLAN-2
Medication: levothyroxine 125 MCG passed protocol.   Last office visit date: 10/22/24  Next appointment scheduled?: Yes   Number of refills given: #90  
DISPLAY PLAN FREE TEXT

## 2024-12-03 NOTE — CONSULT NOTE ADULT - ASSESSMENT
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

## (undated) DEVICE — MEDICATION LABELS W MARKER

## (undated) DEVICE — TAPE SILK 3"

## (undated) DEVICE — GLV 7 PROTEXIS (WHITE)

## (undated) DEVICE — SUT POLYSORB 3-0 30" P-12 UNDYED

## (undated) DEVICE — SOL IRR POUR H2O 1000ML

## (undated) DEVICE — DRSG TAPE MICROFOAM 3"

## (undated) DEVICE — STAPLER SKIN VISI-STAT 35 WIDE

## (undated) DEVICE — GOWN TRIMAX LG

## (undated) DEVICE — WARMING BLANKET UPPER ADULT

## (undated) DEVICE — DRAPE 3/4 SHEET W REINFORCEMENT 56X77"

## (undated) DEVICE — POSITIONER FOAM EGG CRATE ULNAR 2PCS (PINK)

## (undated) DEVICE — PREP BETADINE SPONGE STICKS

## (undated) DEVICE — DRAPE C ARM C-ARMOUR

## (undated) DEVICE — DRSG ACE BANDAGE 6"

## (undated) DEVICE — GLV 8 PROTEXIS (WHITE)

## (undated) DEVICE — SUT POLYSORB 4-0 18" P-13 UNDYED

## (undated) DEVICE — DRILL PILOT LONG 4.0MM

## (undated) DEVICE — FOLEY TRAY 16FR 5CC LTX UMETER CLOSED

## (undated) DEVICE — DRILL PILOT SHORT 4.0MM

## (undated) DEVICE — GLV 6.5 PROTEXIS (WHITE)

## (undated) DEVICE — SOL IRR POUR NS 0.9% 500ML

## (undated) DEVICE — GLV 7.5 PROTEXIS (WHITE)

## (undated) DEVICE — PACK HIP PINNING

## (undated) DEVICE — VENODYNE/SCD SLEEVE CALF LARGE

## (undated) DEVICE — GLV 8.5 PROTEXIS (WHITE)

## (undated) DEVICE — GLV 9 DERMAPRENE ULTRA

## (undated) DEVICE — PREP BETADINE KIT

## (undated) DEVICE — DRSG WEBRIL 6"

## (undated) DEVICE — DRAPE TOWEL BLUE 17" X 24"

## (undated) DEVICE — MARKING PEN W RULER

## (undated) DEVICE — DRSG TEGADERM 4X4.75"